# Patient Record
Sex: MALE | Race: WHITE | Employment: OTHER | URBAN - METROPOLITAN AREA
[De-identification: names, ages, dates, MRNs, and addresses within clinical notes are randomized per-mention and may not be internally consistent; named-entity substitution may affect disease eponyms.]

---

## 2020-01-17 ENCOUNTER — APPOINTMENT (OUTPATIENT)
Dept: GENERAL RADIOLOGY | Age: 70
DRG: 378 | End: 2020-01-17
Payer: MEDICARE

## 2020-01-17 ENCOUNTER — APPOINTMENT (OUTPATIENT)
Dept: CT IMAGING | Age: 70
DRG: 378 | End: 2020-01-17
Payer: MEDICARE

## 2020-01-17 ENCOUNTER — HOSPITAL ENCOUNTER (INPATIENT)
Age: 70
LOS: 3 days | Discharge: HOME | DRG: 378 | End: 2020-01-20
Attending: EMERGENCY MEDICINE | Admitting: SPECIALIST
Payer: MEDICARE

## 2020-01-17 PROBLEM — K62.5 BRBPR (BRIGHT RED BLOOD PER RECTUM): Status: ACTIVE | Noted: 2020-01-17

## 2020-01-17 LAB
ALBUMIN SERPL-MCNC: 3.7 GM/DL (ref 3.4–5)
ALP BLD-CCNC: 48 IU/L (ref 40–129)
ALT SERPL-CCNC: 13 U/L (ref 10–40)
ANION GAP SERPL CALCULATED.3IONS-SCNC: 12 MMOL/L (ref 4–16)
AST SERPL-CCNC: 19 IU/L (ref 15–37)
BASOPHILS ABSOLUTE: 0 K/CU MM
BASOPHILS RELATIVE PERCENT: 0.3 % (ref 0–1)
BILIRUB SERPL-MCNC: 0.4 MG/DL (ref 0–1)
BUN BLDV-MCNC: 14 MG/DL (ref 6–23)
CALCIUM SERPL-MCNC: 8.5 MG/DL (ref 8.3–10.6)
CHLORIDE BLD-SCNC: 95 MMOL/L (ref 99–110)
CO2: 23 MMOL/L (ref 21–32)
CREAT SERPL-MCNC: 1 MG/DL (ref 0.9–1.3)
DIFFERENTIAL TYPE: ABNORMAL
EOSINOPHILS ABSOLUTE: 0 K/CU MM
EOSINOPHILS RELATIVE PERCENT: 0.4 % (ref 0–3)
GFR AFRICAN AMERICAN: >60 ML/MIN/1.73M2
GFR NON-AFRICAN AMERICAN: >60 ML/MIN/1.73M2
GLUCOSE BLD-MCNC: 138 MG/DL (ref 70–99)
HCT VFR BLD CALC: 18.2 % (ref 42–52)
HCT VFR BLD CALC: 28 % (ref 42–52)
HEMOGLOBIN: 9.1 GM/DL (ref 13.5–18)
HEMOGLOBIN: ABNORMAL GM/DL (ref 13.5–18)
IMMATURE NEUTROPHIL %: 0.5 % (ref 0–0.43)
INR BLD: 0.92 INDEX
LYMPHOCYTES ABSOLUTE: 1.2 K/CU MM
LYMPHOCYTES RELATIVE PERCENT: 11.6 % (ref 24–44)
MCH RBC QN AUTO: 31.9 PG (ref 27–31)
MCHC RBC AUTO-ENTMCNC: 32.5 % (ref 32–36)
MCV RBC AUTO: 98.2 FL (ref 78–100)
MONOCYTES ABSOLUTE: 1 K/CU MM
MONOCYTES RELATIVE PERCENT: 10.1 % (ref 0–4)
NUCLEATED RBC %: 0 %
PDW BLD-RTO: 12.7 % (ref 11.7–14.9)
PLATELET # BLD: 470 K/CU MM (ref 140–440)
PMV BLD AUTO: 10.1 FL (ref 7.5–11.1)
POTASSIUM SERPL-SCNC: 4.1 MMOL/L (ref 3.5–5.1)
PROTHROMBIN TIME: 11.1 SECONDS (ref 11.7–14.5)
RBC # BLD: 2.85 M/CU MM (ref 4.6–6.2)
SEGMENTED NEUTROPHILS ABSOLUTE COUNT: 7.9 K/CU MM
SEGMENTED NEUTROPHILS RELATIVE PERCENT: 77.1 % (ref 36–66)
SODIUM BLD-SCNC: 130 MMOL/L (ref 135–145)
TOTAL IMMATURE NEUTOROPHIL: 0.05 K/CU MM
TOTAL NUCLEATED RBC: 0 K/CU MM
TOTAL PROTEIN: 5.8 GM/DL (ref 6.4–8.2)
WBC # BLD: 10.3 K/CU MM (ref 4–10.5)

## 2020-01-17 PROCEDURE — 36430 TRANSFUSION BLD/BLD COMPNT: CPT

## 2020-01-17 PROCEDURE — 96374 THER/PROPH/DIAG INJ IV PUSH: CPT

## 2020-01-17 PROCEDURE — 2000000000 HC ICU R&B

## 2020-01-17 PROCEDURE — 6360000002 HC RX W HCPCS: Performed by: PHYSICIAN ASSISTANT

## 2020-01-17 PROCEDURE — 85014 HEMATOCRIT: CPT

## 2020-01-17 PROCEDURE — 71045 X-RAY EXAM CHEST 1 VIEW: CPT

## 2020-01-17 PROCEDURE — 86922 COMPATIBILITY TEST ANTIGLOB: CPT

## 2020-01-17 PROCEDURE — 99285 EMERGENCY DEPT VISIT HI MDM: CPT

## 2020-01-17 PROCEDURE — 85018 HEMOGLOBIN: CPT

## 2020-01-17 PROCEDURE — P9016 RBC LEUKOCYTES REDUCED: HCPCS

## 2020-01-17 PROCEDURE — C9113 INJ PANTOPRAZOLE SODIUM, VIA: HCPCS | Performed by: PHYSICIAN ASSISTANT

## 2020-01-17 PROCEDURE — 94761 N-INVAS EAR/PLS OXIMETRY MLT: CPT

## 2020-01-17 PROCEDURE — 2580000003 HC RX 258: Performed by: PHYSICIAN ASSISTANT

## 2020-01-17 PROCEDURE — 2580000003 HC RX 258: Performed by: NURSE PRACTITIONER

## 2020-01-17 PROCEDURE — 85025 COMPLETE CBC W/AUTO DIFF WBC: CPT

## 2020-01-17 PROCEDURE — 74174 CTA ABD&PLVS W/CONTRAST: CPT

## 2020-01-17 PROCEDURE — 6370000000 HC RX 637 (ALT 250 FOR IP): Performed by: INTERNAL MEDICINE

## 2020-01-17 PROCEDURE — 86850 RBC ANTIBODY SCREEN: CPT

## 2020-01-17 PROCEDURE — 6360000004 HC RX CONTRAST MEDICATION: Performed by: SPECIALIST

## 2020-01-17 PROCEDURE — C9113 INJ PANTOPRAZOLE SODIUM, VIA: HCPCS | Performed by: NURSE PRACTITIONER

## 2020-01-17 PROCEDURE — 85610 PROTHROMBIN TIME: CPT

## 2020-01-17 PROCEDURE — 93005 ELECTROCARDIOGRAM TRACING: CPT | Performed by: PHYSICIAN ASSISTANT

## 2020-01-17 PROCEDURE — 80053 COMPREHEN METABOLIC PANEL: CPT

## 2020-01-17 PROCEDURE — 6360000002 HC RX W HCPCS: Performed by: NURSE PRACTITIONER

## 2020-01-17 PROCEDURE — 36415 COLL VENOUS BLD VENIPUNCTURE: CPT

## 2020-01-17 PROCEDURE — 84520 ASSAY OF UREA NITROGEN: CPT

## 2020-01-17 PROCEDURE — 6370000000 HC RX 637 (ALT 250 FOR IP): Performed by: NURSE PRACTITIONER

## 2020-01-17 PROCEDURE — 86900 BLOOD TYPING SEROLOGIC ABO: CPT

## 2020-01-17 PROCEDURE — 86901 BLOOD TYPING SEROLOGIC RH(D): CPT

## 2020-01-17 RX ORDER — BRIMONIDINE TARTRATE 2 MG/ML
1 SOLUTION/ DROPS OPHTHALMIC EVERY 12 HOURS
Status: DISCONTINUED | OUTPATIENT
Start: 2020-01-17 | End: 2020-01-17

## 2020-01-17 RX ORDER — METOPROLOL TARTRATE 50 MG/1
50 TABLET, FILM COATED ORAL 2 TIMES DAILY
Status: DISCONTINUED | OUTPATIENT
Start: 2020-01-17 | End: 2020-01-20 | Stop reason: HOSPADM

## 2020-01-17 RX ORDER — BRIMONIDINE TARTRATE, TIMOLOL MALEATE 2; 5 MG/ML; MG/ML
1 SOLUTION/ DROPS OPHTHALMIC EVERY 12 HOURS
Status: DISCONTINUED | OUTPATIENT
Start: 2020-01-17 | End: 2020-01-17 | Stop reason: SDUPTHER

## 2020-01-17 RX ORDER — LORAZEPAM 1 MG/1
1 TABLET ORAL
Status: DISCONTINUED | OUTPATIENT
Start: 2020-01-17 | End: 2020-01-20 | Stop reason: HOSPADM

## 2020-01-17 RX ORDER — LATANOPROST 50 UG/ML
1 SOLUTION/ DROPS OPHTHALMIC NIGHTLY
Status: DISCONTINUED | OUTPATIENT
Start: 2020-01-17 | End: 2020-01-17

## 2020-01-17 RX ORDER — LOSARTAN POTASSIUM 25 MG/1
25 TABLET ORAL DAILY
Status: DISCONTINUED | OUTPATIENT
Start: 2020-01-17 | End: 2020-01-20 | Stop reason: HOSPADM

## 2020-01-17 RX ORDER — ALPRAZOLAM 0.25 MG/1
0.25 TABLET ORAL EVERY 6 HOURS PRN
Status: DISCONTINUED | OUTPATIENT
Start: 2020-01-17 | End: 2020-01-20 | Stop reason: HOSPADM

## 2020-01-17 RX ORDER — TRAVOPROST OPHTHALMIC SOLUTION 0.04 MG/ML
1 SOLUTION OPHTHALMIC NIGHTLY
Status: DISCONTINUED | OUTPATIENT
Start: 2020-01-17 | End: 2020-01-20 | Stop reason: HOSPADM

## 2020-01-17 RX ORDER — LORAZEPAM 1 MG/1
3 TABLET ORAL
Status: DISCONTINUED | OUTPATIENT
Start: 2020-01-17 | End: 2020-01-20 | Stop reason: HOSPADM

## 2020-01-17 RX ORDER — TIMOLOL MALEATE 5 MG/ML
1 SOLUTION/ DROPS OPHTHALMIC EVERY 12 HOURS
Status: DISCONTINUED | OUTPATIENT
Start: 2020-01-17 | End: 2020-01-17

## 2020-01-17 RX ORDER — BRIMONIDINE TARTRATE, TIMOLOL MALEATE 2; 5 MG/ML; MG/ML
1 SOLUTION/ DROPS OPHTHALMIC EVERY 12 HOURS
Status: DISCONTINUED | OUTPATIENT
Start: 2020-01-17 | End: 2020-01-20 | Stop reason: HOSPADM

## 2020-01-17 RX ORDER — SODIUM CHLORIDE 0.9 % (FLUSH) 0.9 %
10 SYRINGE (ML) INJECTION EVERY 12 HOURS SCHEDULED
Status: DISCONTINUED | OUTPATIENT
Start: 2020-01-17 | End: 2020-01-20 | Stop reason: HOSPADM

## 2020-01-17 RX ORDER — SODIUM CHLORIDE 0.9 % (FLUSH) 0.9 %
10 SYRINGE (ML) INJECTION EVERY 12 HOURS SCHEDULED
Status: DISCONTINUED | OUTPATIENT
Start: 2020-01-17 | End: 2020-01-17 | Stop reason: SDUPTHER

## 2020-01-17 RX ORDER — FENOFIBRATE 160 MG/1
160 TABLET ORAL DAILY
Status: DISCONTINUED | OUTPATIENT
Start: 2020-01-17 | End: 2020-01-20 | Stop reason: HOSPADM

## 2020-01-17 RX ORDER — LORAZEPAM 2 MG/ML
3 INJECTION INTRAMUSCULAR
Status: DISCONTINUED | OUTPATIENT
Start: 2020-01-17 | End: 2020-01-20 | Stop reason: HOSPADM

## 2020-01-17 RX ORDER — ACETAMINOPHEN 325 MG/1
650 TABLET ORAL EVERY 4 HOURS PRN
Status: DISCONTINUED | OUTPATIENT
Start: 2020-01-17 | End: 2020-01-20 | Stop reason: HOSPADM

## 2020-01-17 RX ORDER — BRIMONIDINE TARTRATE, TIMOLOL MALEATE 2; 5 MG/ML; MG/ML
1 SOLUTION/ DROPS OPHTHALMIC EVERY 12 HOURS
COMMUNITY

## 2020-01-17 RX ORDER — ONDANSETRON 2 MG/ML
4 INJECTION INTRAMUSCULAR; INTRAVENOUS EVERY 6 HOURS PRN
Status: DISCONTINUED | OUTPATIENT
Start: 2020-01-17 | End: 2020-01-20 | Stop reason: HOSPADM

## 2020-01-17 RX ORDER — 0.9 % SODIUM CHLORIDE 0.9 %
20 INTRAVENOUS SOLUTION INTRAVENOUS ONCE
Status: DISCONTINUED | OUTPATIENT
Start: 2020-01-17 | End: 2020-01-20 | Stop reason: HOSPADM

## 2020-01-17 RX ORDER — 0.9 % SODIUM CHLORIDE 0.9 %
250 INTRAVENOUS SOLUTION INTRAVENOUS ONCE
Status: DISCONTINUED | OUTPATIENT
Start: 2020-01-17 | End: 2020-01-20 | Stop reason: HOSPADM

## 2020-01-17 RX ORDER — NIFEDIPINE 30 MG/1
60 TABLET, EXTENDED RELEASE ORAL DAILY
Status: DISCONTINUED | OUTPATIENT
Start: 2020-01-17 | End: 2020-01-20 | Stop reason: HOSPADM

## 2020-01-17 RX ORDER — SODIUM CHLORIDE 0.9 % (FLUSH) 0.9 %
10 SYRINGE (ML) INJECTION PRN
Status: DISCONTINUED | OUTPATIENT
Start: 2020-01-17 | End: 2020-01-20 | Stop reason: HOSPADM

## 2020-01-17 RX ORDER — PANTOPRAZOLE SODIUM 40 MG/10ML
40 INJECTION, POWDER, LYOPHILIZED, FOR SOLUTION INTRAVENOUS ONCE
Status: COMPLETED | OUTPATIENT
Start: 2020-01-17 | End: 2020-01-17

## 2020-01-17 RX ORDER — FOLIC ACID 1 MG/1
1 TABLET ORAL DAILY
Status: DISCONTINUED | OUTPATIENT
Start: 2020-01-17 | End: 2020-01-20 | Stop reason: HOSPADM

## 2020-01-17 RX ORDER — BRINZOLAMIDE 10 MG/ML
1 SUSPENSION/ DROPS OPHTHALMIC 3 TIMES DAILY
Status: DISCONTINUED | OUTPATIENT
Start: 2020-01-17 | End: 2020-01-20 | Stop reason: HOSPADM

## 2020-01-17 RX ORDER — PANTOPRAZOLE SODIUM 40 MG/10ML
40 INJECTION, POWDER, LYOPHILIZED, FOR SOLUTION INTRAVENOUS 2 TIMES DAILY
Status: DISCONTINUED | OUTPATIENT
Start: 2020-01-17 | End: 2020-01-19

## 2020-01-17 RX ORDER — SODIUM CHLORIDE 9 MG/ML
INJECTION, SOLUTION INTRAVENOUS CONTINUOUS
Status: DISPENSED | OUTPATIENT
Start: 2020-01-17 | End: 2020-01-18

## 2020-01-17 RX ORDER — SODIUM CHLORIDE 0.9 % (FLUSH) 0.9 %
10 SYRINGE (ML) INJECTION PRN
Status: DISCONTINUED | OUTPATIENT
Start: 2020-01-17 | End: 2020-01-17 | Stop reason: SDUPTHER

## 2020-01-17 RX ORDER — LORAZEPAM 2 MG/ML
2 INJECTION INTRAMUSCULAR
Status: DISCONTINUED | OUTPATIENT
Start: 2020-01-17 | End: 2020-01-20 | Stop reason: HOSPADM

## 2020-01-17 RX ORDER — LORAZEPAM 2 MG/ML
4 INJECTION INTRAMUSCULAR
Status: DISCONTINUED | OUTPATIENT
Start: 2020-01-17 | End: 2020-01-20 | Stop reason: HOSPADM

## 2020-01-17 RX ORDER — THIAMINE MONONITRATE (VIT B1) 100 MG
100 TABLET ORAL DAILY
Status: DISCONTINUED | OUTPATIENT
Start: 2020-01-17 | End: 2020-01-20 | Stop reason: HOSPADM

## 2020-01-17 RX ORDER — DORZOLAMIDE HCL 20 MG/ML
1 SOLUTION/ DROPS OPHTHALMIC 3 TIMES DAILY
Status: DISCONTINUED | OUTPATIENT
Start: 2020-01-17 | End: 2020-01-17

## 2020-01-17 RX ORDER — LORAZEPAM 2 MG/ML
1 INJECTION INTRAMUSCULAR
Status: DISCONTINUED | OUTPATIENT
Start: 2020-01-17 | End: 2020-01-20 | Stop reason: HOSPADM

## 2020-01-17 RX ORDER — LORAZEPAM 1 MG/1
2 TABLET ORAL
Status: DISCONTINUED | OUTPATIENT
Start: 2020-01-17 | End: 2020-01-20 | Stop reason: HOSPADM

## 2020-01-17 RX ORDER — LORAZEPAM 1 MG/1
4 TABLET ORAL
Status: DISCONTINUED | OUTPATIENT
Start: 2020-01-17 | End: 2020-01-20 | Stop reason: HOSPADM

## 2020-01-17 RX ORDER — 0.9 % SODIUM CHLORIDE 0.9 %
1000 INTRAVENOUS SOLUTION INTRAVENOUS ONCE
Status: COMPLETED | OUTPATIENT
Start: 2020-01-17 | End: 2020-01-17

## 2020-01-17 RX ORDER — NICOTINE 21 MG/24HR
1 PATCH, TRANSDERMAL 24 HOURS TRANSDERMAL DAILY
Status: DISCONTINUED | OUTPATIENT
Start: 2020-01-17 | End: 2020-01-20 | Stop reason: HOSPADM

## 2020-01-17 RX ORDER — ATORVASTATIN CALCIUM 40 MG/1
40 TABLET, FILM COATED ORAL DAILY
Status: DISCONTINUED | OUTPATIENT
Start: 2020-01-17 | End: 2020-01-20 | Stop reason: HOSPADM

## 2020-01-17 RX ADMIN — FENOFIBRATE 160 MG: 160 TABLET ORAL at 16:11

## 2020-01-17 RX ADMIN — FOLIC ACID 1 MG: 1 TABLET ORAL at 16:28

## 2020-01-17 RX ADMIN — SODIUM CHLORIDE: 9 INJECTION, SOLUTION INTRAVENOUS at 16:12

## 2020-01-17 RX ADMIN — SODIUM CHLORIDE 1000 ML: 9 INJECTION, SOLUTION INTRAVENOUS at 11:24

## 2020-01-17 RX ADMIN — BRINZOLAMIDE 1 DROP: 10 SUSPENSION/ DROPS OPHTHALMIC at 17:51

## 2020-01-17 RX ADMIN — NIFEDIPINE 60 MG: 30 TABLET, FILM COATED, EXTENDED RELEASE ORAL at 16:09

## 2020-01-17 RX ADMIN — Medication 100 MG: at 16:28

## 2020-01-17 RX ADMIN — LOSARTAN POTASSIUM 25 MG: 25 TABLET, FILM COATED ORAL at 16:10

## 2020-01-17 RX ADMIN — BRIMONIDINE TARTRATE, TIMOLOL MALEATE 1 DROP: 2; 5 SOLUTION/ DROPS OPHTHALMIC at 17:10

## 2020-01-17 RX ADMIN — PANTOPRAZOLE SODIUM 40 MG: 40 INJECTION, POWDER, FOR SOLUTION INTRAVENOUS at 11:24

## 2020-01-17 RX ADMIN — IOPAMIDOL 90 ML: 755 INJECTION, SOLUTION INTRAVENOUS at 22:58

## 2020-01-17 RX ADMIN — METOPROLOL TARTRATE 50 MG: 50 TABLET, FILM COATED ORAL at 16:10

## 2020-01-17 RX ADMIN — PANTOPRAZOLE SODIUM 40 MG: 40 INJECTION, POWDER, FOR SOLUTION INTRAVENOUS at 23:21

## 2020-01-17 RX ADMIN — TRAVOPROST OPHTHALMIC SOLUTION 1 DROP: 0.04 SOLUTION OPHTHALMIC at 23:51

## 2020-01-17 ASSESSMENT — PAIN SCALES - GENERAL: PAINLEVEL_OUTOF10: 0

## 2020-01-17 NOTE — PROGRESS NOTES
Admission skin assessment completed by this rn and na, [place]. No open areas/redness noted. Scaley skin noted on bilateral heels.

## 2020-01-17 NOTE — ED PROVIDER NOTES
I independently examined and evaluated Laci Botello. In brief, patient presenting with rectal bleeding both bright red and now darker, slightly lightheaded, no chest pain or shortness of breath. Focused exam revealed resting comfortably, respirations nonlabored. ED course: Patient is slightly tachycardic otherwise vital signs are not unstable work-up initiated patient has an acute anemia drop from 11-1/2-9.1 BUN and creatinine not elevated, do not need to transfuse right now but do need to bring into the hospital, hospitalist notified    All diagnostic, treatment, and disposition decisions were made by myself in conjunction with the advanced practice provider. For all further details of the patient's emergency department visit, please see the advanced practice provider's documentation. Comment: Please note this report has been produced using speech recognition software and may contain errors related to that system including errors in grammar, punctuation, and spelling, as well as words and phrases that may be inappropriate. If there are any questions or concerns please feel free to contact the dictating provider for clarification.         Sisi Quinones MD  01/17/20 [id number]

## 2020-01-17 NOTE — PROGRESS NOTES
Historical Provider, MD   NIFEdipine (PROCARDIA XL) 60 MG extended release tablet Take 60 mg by mouth daily   Yes Historical Provider, MD   nicotine (NICODERM CQ) 14 MG/24HR Place 1 patch onto the skin daily 9/14/16   Ayden Castanon MD       Medications added or changed (ex.  new medication, dosage change, interval change, formulation change):  Combigan new medication  Spiriva new medication  Nutritional supplements new medication  Travatan clarified as 1 drop in left eye nightly from both eye nightly per patient  Azopt clarified as 1 drop into left eye tid from both eyes tid per patient    Medications removed from list (include reason, ex. noncompliance, medication cost, therapy complete etc.):   Protonix no longer taking  Timolol no longer using    Medications requiring reconciliation with provider:   Brilinta not ordered and patient takes  Nicotine patch not ordered and patient requested while in hospital  Aspirin not ordered and patient takes  Lomotil not ordered and patient takes  Timolol ordered and patient no longer uses  Combigan new medication not ordered  Spiriva new medication not ordered  Travatan not ordered and patient uses  Nutritional supplements new medication not ordered    Comments:  Reviewed and updated med list per patient and verified with claims  Patient states due to bleeding issues he has not taken brilinta or aspirin in about 2 days  Patient states he is not using nicotine patches but he still smokes and would like one while he is in the hospital    To my knowledge the above medication history is accurate as of 1/17/2020 12:48 PM.   Daryle Dura, CPhT   1/17/2020 12:48 PM

## 2020-01-17 NOTE — ED NOTES
Per daughter, pt drinks 4-5 glasses of whiskey per day and smokes 2 packs of cigarettes per day       Korey Gr RN  01/17/20 1918

## 2020-01-17 NOTE — H&P
History and Physical      Name:  Saul Gallagher /Age/Sex:   (68 y.o. male)   MRN & CSN:  [id number] & [id number] Admission Date/Time: 2020 10:03 AM   Location:  ED16/ED-16 PCP: Riya Castro MD       Discussed patient with Dr. Tabitha Prather and Plan:     Saul Gallagher is a 68 y.o.  male  who presents with BRBPR    - BRBPR  2 days  Hgb 9.1  Consult to GI  Type and screen and transfuse for Hgb < 7.0  H & H Q 6 hrs x 4   Hold ASA and Brilinta (PTCA )  Protonix IV BID  Clear liquid diet & IVF      Chronic  - HTN- Stable; cont BB, nifedipine, cozaar with parameters  - HLD- Statin  - Anxiety- xanax  - Glaucoma- continue home regimen  - COPD- Not O2 dependent; not in exacerbation; continue home meds    Discussed patient with ER physician    Diet Clear liquid   DVT Prophylaxis [] Lovenox, []  Heparin, [x] SCDs, [] Ambulation   GI Prophylaxis [x] PPI,  [] H2 Blocker,  [] Carafate,  [] Diet/Tube Feeds   Code Status Full    Disposition Patient requires continued admission due to BRBPR   MDM [] Low, [x] Moderate,[]  High  Patient's risk as above due to      [] One or more chronic illnesses with exacerbation progression      [x] Two or more stable chronic illnesses      [x] Undiagnosed new problem with uncertain prognosis      [] Elective major surgery      []Prescription drug management       History of Present Illness:     Chief Complaint: Bright red blood per rectum    Saul Gallagher is a 68 y.o.  male  who presents with the above complaints, onset yesterday. Reports multiple episodes of BRBPR yesterday, continuing into today. Denies hematochezia. Reports 2 prior c-scopes with polyp removal, last . Denies hx of ulcer. Reports mild SOB over baseline (COPD hx). Reports PTCA done in 2016- no stents since. Takes brilinta and asa, which he stopped yesterday. Denies pain, including chest pain. Has PMH of HTN,HLD, glaucoma.       Ten point ROS reviewed negative, unless as noted above    Objective:     No intake or output data in the 24 hours ending 20 1231     Vitals:   Vitals:    20 1213   BP: 147/77   Pulse: 89   Resp: 18   Temp: 97.9   SpO2: 98%       Physical Exam:     GEN Awake male, sitting upright in bed in no apparent distress. Appears given age. EYES Pupils are equally round. No scleral erythema, discharge, or conjunctivitis. HENT Mucous membranes are moist. Oral pharynx without exudates, no evidence of thrush. NECK Supple, no apparent thyromegaly or masses. RESP Clear to auscultation, no wheezes, rales or rhonchi. Symmetric chest movement while on room air. CARDIO/VASC S1/S2 auscultated. Regular rate without appreciable murmurs, rubs, or gallops. No JVD or carotid bruits. Peripheral pulses equal bilaterally and palpable. No peripheral edema. GI Abdomen is soft without significant tenderness, masses, or guarding. Bowel sounds are normoactive. Rectal exam deferred.  No costovertebral angle tenderness. Giron catheter is not present. HEME/LYMPH No palpable cervical lymphadenopathy and no hepatosplenomegaly. No petechiae or ecchymoses. MSK No gross joint deformities. Strength equal in BLE and BUE  SKIN Normal coloration, warm, dry. NEURO Cranial nerves appear grossly intact, normal speech, no lateralizing weakness. PSYCH Awake, alert, oriented x 4. Affect appropriate. Past Medical History:        Past Medical History:   Diagnosis Date    Anxiety     Cancer [facility])     prostate    COPD (chronic obstructive pulmonary disease) ([facility] [address]     Glaucoma     Hyperlipidemia     Hypertension        PSHX:  has a past surgical history that includes Colonoscopy; Tonsillectomy; and Prostate surgery ().     Allergies: No Known Allergies    FAM HX: Both parents ; no known family history    Soc HX:   Social History     Socioeconomic History    Marital status:      Spouse name: None    Number of children: None    Years of education: None    Highest

## 2020-01-17 NOTE — ED PROVIDER NOTES
TABS tablet Take 1 tablet by mouth 2 times daily 60 tablet 1    nicotine (NICODERM CQ) 14 MG/24HR Place 1 patch onto the skin daily 30 patch 1    pantoprazole (PROTONIX) 40 MG tablet Take 1 tablet by mouth every morning (before breakfast) 30 tablet 1    aspirin EC 81 MG EC tablet Take 1 tablet by mouth daily 30 tablet 1    travoprost, benzalkonium, (TRAVATAN) 0.004 % ophthalmic solution Place 1 drop into both eyes nightly      brinzolamide (AZOPT) 1 % ophthalmic suspension Place 1 drop into both eyes 3 times daily      timolol (BETIMOL) 0.25 % ophthalmic solution Place 1-2 drops into both eyes every morning      ALPRAZolam (XANAX) 0.25 MG tablet Take 0.25 mg by mouth every 6 hours as needed for Sleep or Anxiety      atorvastatin (LIPITOR) 40 MG tablet Take 40 mg by mouth daily      fenofibrate (TRICOR) 145 MG tablet Take 145 mg by mouth daily      diphenoxylate-atropine (LOMOTIL) 2.5-0.025 MG per tablet Take 2 tablets by mouth daily as needed for Diarrhea      losartan (COZAAR) 25 MG tablet Take 25 mg by mouth daily      NIFEdipine (PROCARDIA XL) 60 MG extended release tablet Take 60 mg by mouth daily         ALLERGIES    No Known Allergies    SOCIAL AND FAMILY HISTORY    Social History     Socioeconomic History    Marital status:      Spouse name: None    Number of children: None    Years of education: None    Highest education level: None   Occupational History    None   Social Needs    Financial resource strain: None    Food insecurity:     Worry: None     Inability: None    Transportation needs:     Medical: None     Non-medical: None   Tobacco Use    Smoking status: Current Every Day Smoker     Packs/day: 1.50     Types: Cigarettes    Smokeless tobacco: Never Used   Substance and Sexual Activity    Alcohol use:  Yes     Alcohol/week: 4.0 - 5.0 standard drinks     Types: 4 - 5 Glasses of wine per week     Comment: mixed drinks per day, whiskey    Drug use: Never    Sexual activity: None   Lifestyle    Physical activity:     Days per week: None     Minutes per session: None    Stress: None   Relationships    Social connections:     Talks on phone: None     Gets together: None     Attends Oriental orthodox service: None     Active member of club or organization: None     Attends meetings of clubs or organizations: None     Relationship status: None    Intimate partner violence:     Fear of current or ex partner: None     Emotionally abused: None     Physically abused: None     Forced sexual activity: None   Other Topics Concern    None   Social History Narrative    None     History reviewed. No pertinent family history. PHYSICAL EXAM    VITAL SIGNS: /61   Pulse 89   Temp 97.9 °F (36.6 °C)   Resp 18   Ht 5' 8\" (1.727 m)   Wt 155 lb (70.3 kg)   SpO2 98%   BMI 23.57 kg/m²   Constitutional:  Well developed, well nourished. No distress  Eyes:  Sclera nonicteric, conjunctiva moist  HENT:  Atraumatic. PERRL. EOMI. Moist mucus membranes. Neck/Lymphatics: supple, no JVD, no swollen nodes  Respiratory:  No retractions, no accessory muscle use, normal breath sounds   Cardiovascular:   normal rate, normal rhythm, no murmurs    GI:     No gross discoloration. Bowel sounds present, no audible bruits. Soft,  no distention, no guarding, no rigidity,   no abdominal tenderness, no rebound tenderness, no palpable pulsatile masses    Back:   No CVA tenderness to percussion.   Musculoskeletal:  No edema, no deformity  Vascular: DP pulses 2+ equal bilaterally  Integument: No rash, dry skin  Neurologic:  Alert & oriented, normal speech  Psychiatric: Cooperative, pleasant affect       LABS:  Results for orders placed or performed during the hospital encounter of 01/17/20   CBC auto diff   Result Value Ref Range    WBC 10.3 4.0 - 10.5 K/CU MM    RBC 2.85 (L) 4.6 - 6.2 M/CU MM    Hemoglobin 9.1 (L) 13.5 - 18.0 GM/DL    Hematocrit 28.0 (L) 42 - 52 %    MCV 98.2 78 - 100 FL    MCH 31.9 (H) 27 - 31 PG    MCHC 32.5 32.0 - 36.0 %    RDW 12.7 11.7 - 14.9 %    Platelets 930 (H) 007 - 440 K/CU MM    MPV 10.1 7.5 - 11.1 FL    Differential Type AUTOMATED DIFFERENTIAL     Segs Relative 77.1 (H) 36 - 66 %    Lymphocytes % 11.6 (L) 24 - 44 %    Monocytes % 10.1 (H) 0 - 4 %    Eosinophils % 0.4 0 - 3 %    Basophils % 0.3 0 - 1 %    Segs Absolute 7.9 K/CU MM    Lymphocytes Absolute 1.2 K/CU MM    Monocytes Absolute 1.0 K/CU MM    Eosinophils Absolute 0.0 K/CU MM    Basophils Absolute 0.0 K/CU MM    Nucleated RBC % 0.0 %    Total Nucleated RBC 0.0 K/CU MM    Total Immature Neutrophil 0.05 K/CU MM    Immature Neutrophil % 0.5 (H) 0 - 0.43 %   CMP   Result Value Ref Range    Sodium 130 (L) 135 - 145 MMOL/L    Potassium 4.1 3.5 - 5.1 MMOL/L    Chloride 95 (L) 99 - 110 mMol/L    CO2 23 21 - 32 MMOL/L    BUN 14 6 - 23 MG/DL    CREATININE 1.0 0.9 - 1.3 MG/DL    Glucose 138 (H) 70 - 99 MG/DL    Calcium 8.5 8.3 - 10.6 MG/DL    Alb 3.7 3.4 - 5.0 GM/DL    Total Protein 5.8 (L) 6.4 - 8.2 GM/DL    Total Bilirubin 0.4 0.0 - 1.0 MG/DL    ALT 13 10 - 40 U/L    AST 19 15 - 37 IU/L    Alkaline Phosphatase 48 40 - 129 IU/L    GFR Non-African American >60 >60 mL/min/1.73m2    GFR African American >60 >60 mL/min/1.73m2    Anion Gap 12 4 - 16   PT - INR   Result Value Ref Range    Protime 11.1 (L) 11.7 - 14.5 SECONDS    INR 0.92 INDEX   EKG 12 Lead   Result Value Ref Range    Ventricular Rate 95 BPM    Atrial Rate 95 BPM    P-R Interval 172 ms    QRS Duration 96 ms    Q-T Interval 352 ms    QTc Calculation (Bazett) 442 ms    P Axis 75 degrees    R Axis 38 degrees    T Axis 82 degrees    Diagnosis       Normal sinus rhythm  ST elevation, consider early repolarization, pericarditis, or injury  Abnormal ECG  When compared with ECG of 12-SEP-2016 15:57,  Incomplete right bundle branch block is no longer present             RADIOLOGY/PROCEDURES    XR CHEST PORTABLE   Final Result   Clear lungs. Severe emphysematous changes.   No significant

## 2020-01-18 ENCOUNTER — ANESTHESIA (OUTPATIENT)
Dept: ENDOSCOPY | Age: 70
DRG: 378 | End: 2020-01-18
Payer: MEDICARE

## 2020-01-18 ENCOUNTER — ANESTHESIA EVENT (OUTPATIENT)
Dept: ENDOSCOPY | Age: 70
DRG: 378 | End: 2020-01-18
Payer: MEDICARE

## 2020-01-18 VITALS — DIASTOLIC BLOOD PRESSURE: 56 MMHG | OXYGEN SATURATION: 100 % | SYSTOLIC BLOOD PRESSURE: 125 MMHG

## 2020-01-18 LAB
ANION GAP SERPL CALCULATED.3IONS-SCNC: 7 MMOL/L (ref 4–16)
BUN BLDV-MCNC: 12 MG/DL (ref 6–23)
BUN BLDV-MCNC: 9 MG/DL (ref 6–23)
CALCIUM SERPL-MCNC: 7.9 MG/DL (ref 8.3–10.6)
CHLORIDE BLD-SCNC: 102 MMOL/L (ref 99–110)
CO2: 24 MMOL/L (ref 21–32)
CREAT SERPL-MCNC: 0.7 MG/DL (ref 0.9–1.3)
GFR AFRICAN AMERICAN: >60 ML/MIN/1.73M2
GFR NON-AFRICAN AMERICAN: >60 ML/MIN/1.73M2
GLUCOSE BLD-MCNC: 96 MG/DL (ref 70–99)
HCT VFR BLD CALC: 21.7 % (ref 42–52)
HCT VFR BLD CALC: 23.6 % (ref 42–52)
HCT VFR BLD CALC: 24.3 % (ref 42–52)
HCT VFR BLD CALC: 25.3 % (ref 42–52)
HCT VFR BLD CALC: 26.6 % (ref 42–52)
HCT VFR BLD CALC: 27.3 % (ref 42–52)
HCT VFR BLD CALC: 30.1 % (ref 42–52)
HEMOGLOBIN: 7.1 GM/DL (ref 13.5–18)
HEMOGLOBIN: 7.9 GM/DL (ref 13.5–18)
HEMOGLOBIN: 8 GM/DL (ref 13.5–18)
HEMOGLOBIN: 8.2 GM/DL (ref 13.5–18)
HEMOGLOBIN: 8.5 GM/DL (ref 13.5–18)
HEMOGLOBIN: 8.8 GM/DL (ref 13.5–18)
HEMOGLOBIN: 9.7 GM/DL (ref 13.5–18)
MAGNESIUM: 1.6 MG/DL (ref 1.8–2.4)
POTASSIUM SERPL-SCNC: 4.3 MMOL/L (ref 3.5–5.1)
SODIUM BLD-SCNC: 133 MMOL/L (ref 135–145)
TSH HIGH SENSITIVITY: 3.58 UIU/ML (ref 0.27–4.2)

## 2020-01-18 PROCEDURE — 2709999900 HC NON-CHARGEABLE SUPPLY: Performed by: SPECIALIST

## 2020-01-18 PROCEDURE — 2000000000 HC ICU R&B

## 2020-01-18 PROCEDURE — 0DBK8ZZ EXCISION OF ASCENDING COLON, VIA NATURAL OR ARTIFICIAL OPENING ENDOSCOPIC: ICD-10-PCS | Performed by: SPECIALIST

## 2020-01-18 PROCEDURE — 6370000000 HC RX 637 (ALT 250 FOR IP): Performed by: SPECIALIST

## 2020-01-18 PROCEDURE — 88305 TISSUE EXAM BY PATHOLOGIST: CPT

## 2020-01-18 PROCEDURE — 3700000000 HC ANESTHESIA ATTENDED CARE: Performed by: SPECIALIST

## 2020-01-18 PROCEDURE — C9113 INJ PANTOPRAZOLE SODIUM, VIA: HCPCS | Performed by: SPECIALIST

## 2020-01-18 PROCEDURE — 2580000003 HC RX 258: Performed by: SPECIALIST

## 2020-01-18 PROCEDURE — 3609009900 HC COLONOSCOPY W/CONTROL BLEEDING ANY METHOD: Performed by: SPECIALIST

## 2020-01-18 PROCEDURE — 6360000002 HC RX W HCPCS: Performed by: NURSE ANESTHETIST, CERTIFIED REGISTERED

## 2020-01-18 PROCEDURE — 6360000002 HC RX W HCPCS: Performed by: NURSE PRACTITIONER

## 2020-01-18 PROCEDURE — 2580000003 HC RX 258: Performed by: INTERNAL MEDICINE

## 2020-01-18 PROCEDURE — 2500000003 HC RX 250 WO HCPCS: Performed by: NURSE ANESTHETIST, CERTIFIED REGISTERED

## 2020-01-18 PROCEDURE — 6360000002 HC RX W HCPCS: Performed by: INTERNAL MEDICINE

## 2020-01-18 PROCEDURE — 2580000003 HC RX 258: Performed by: NURSE PRACTITIONER

## 2020-01-18 PROCEDURE — 84443 ASSAY THYROID STIM HORMONE: CPT

## 2020-01-18 PROCEDURE — 93010 ELECTROCARDIOGRAM REPORT: CPT | Performed by: INTERNAL MEDICINE

## 2020-01-18 PROCEDURE — 85014 HEMATOCRIT: CPT

## 2020-01-18 PROCEDURE — 6360000002 HC RX W HCPCS: Performed by: SURGERY

## 2020-01-18 PROCEDURE — C9113 INJ PANTOPRAZOLE SODIUM, VIA: HCPCS | Performed by: NURSE PRACTITIONER

## 2020-01-18 PROCEDURE — 6370000000 HC RX 637 (ALT 250 FOR IP): Performed by: INTERNAL MEDICINE

## 2020-01-18 PROCEDURE — 85025 COMPLETE CBC W/AUTO DIFF WBC: CPT

## 2020-01-18 PROCEDURE — 85018 HEMOGLOBIN: CPT

## 2020-01-18 PROCEDURE — 2720000010 HC SURG SUPPLY STERILE: Performed by: SPECIALIST

## 2020-01-18 PROCEDURE — 83735 ASSAY OF MAGNESIUM: CPT

## 2020-01-18 PROCEDURE — 3700000001 HC ADD 15 MINUTES (ANESTHESIA): Performed by: SPECIALIST

## 2020-01-18 PROCEDURE — 2580000003 HC RX 258: Performed by: SURGERY

## 2020-01-18 PROCEDURE — 2580000003 HC RX 258: Performed by: NURSE ANESTHETIST, CERTIFIED REGISTERED

## 2020-01-18 PROCEDURE — 80048 BASIC METABOLIC PNL TOTAL CA: CPT

## 2020-01-18 PROCEDURE — 6360000002 HC RX W HCPCS: Performed by: SPECIALIST

## 2020-01-18 RX ORDER — SODIUM CHLORIDE, SODIUM LACTATE, POTASSIUM CHLORIDE, CALCIUM CHLORIDE 600; 310; 30; 20 MG/100ML; MG/100ML; MG/100ML; MG/100ML
INJECTION, SOLUTION INTRAVENOUS CONTINUOUS PRN
Status: DISCONTINUED | OUTPATIENT
Start: 2020-01-18 | End: 2020-01-18 | Stop reason: SDUPTHER

## 2020-01-18 RX ORDER — MAGNESIUM SULFATE IN WATER 40 MG/ML
2 INJECTION, SOLUTION INTRAVENOUS ONCE
Status: COMPLETED | OUTPATIENT
Start: 2020-01-18 | End: 2020-01-18

## 2020-01-18 RX ORDER — PROPOFOL 10 MG/ML
INJECTION, EMULSION INTRAVENOUS PRN
Status: DISCONTINUED | OUTPATIENT
Start: 2020-01-18 | End: 2020-01-18 | Stop reason: SDUPTHER

## 2020-01-18 RX ORDER — LIDOCAINE HYDROCHLORIDE 20 MG/ML
INJECTION, SOLUTION EPIDURAL; INFILTRATION; INTRACAUDAL; PERINEURAL PRN
Status: DISCONTINUED | OUTPATIENT
Start: 2020-01-18 | End: 2020-01-18 | Stop reason: SDUPTHER

## 2020-01-18 RX ORDER — FENTANYL CITRATE 50 UG/ML
INJECTION, SOLUTION INTRAMUSCULAR; INTRAVENOUS PRN
Status: DISCONTINUED | OUTPATIENT
Start: 2020-01-18 | End: 2020-01-18 | Stop reason: SDUPTHER

## 2020-01-18 RX ADMIN — ALPRAZOLAM 0.25 MG: 0.25 TABLET ORAL at 21:06

## 2020-01-18 RX ADMIN — PROPOFOL 30 MG: 10 INJECTION, EMULSION INTRAVENOUS at 12:47

## 2020-01-18 RX ADMIN — SODIUM CHLORIDE, PRESERVATIVE FREE 10 ML: 5 INJECTION INTRAVENOUS at 08:28

## 2020-01-18 RX ADMIN — FOLIC ACID 1 MG: 1 TABLET ORAL at 13:41

## 2020-01-18 RX ADMIN — CALCIUM GLUCONATE 2 G: 98 INJECTION, SOLUTION INTRAVENOUS at 11:55

## 2020-01-18 RX ADMIN — PROPOFOL 30 MG: 10 INJECTION, EMULSION INTRAVENOUS at 12:21

## 2020-01-18 RX ADMIN — GLUCAGON HYDROCHLORIDE 0.5 MG: KIT at 12:49

## 2020-01-18 RX ADMIN — BRINZOLAMIDE 1 DROP: 10 SUSPENSION/ DROPS OPHTHALMIC at 07:57

## 2020-01-18 RX ADMIN — PROPOFOL 50 MG: 10 INJECTION, EMULSION INTRAVENOUS at 12:15

## 2020-01-18 RX ADMIN — PROPOFOL 30 MG: 10 INJECTION, EMULSION INTRAVENOUS at 12:54

## 2020-01-18 RX ADMIN — ATORVASTATIN CALCIUM 40 MG: 40 TABLET, FILM COATED ORAL at 13:41

## 2020-01-18 RX ADMIN — PROPOFOL 30 MG: 10 INJECTION, EMULSION INTRAVENOUS at 12:28

## 2020-01-18 RX ADMIN — PROPOFOL 50 MG: 10 INJECTION, EMULSION INTRAVENOUS at 12:13

## 2020-01-18 RX ADMIN — SODIUM CHLORIDE, PRESERVATIVE FREE 10 ML: 5 INJECTION INTRAVENOUS at 20:58

## 2020-01-18 RX ADMIN — BRIMONIDINE TARTRATE, TIMOLOL MALEATE 1 DROP: 2; 5 SOLUTION/ DROPS OPHTHALMIC at 20:57

## 2020-01-18 RX ADMIN — BRINZOLAMIDE 1 DROP: 10 SUSPENSION/ DROPS OPHTHALMIC at 13:56

## 2020-01-18 RX ADMIN — LIDOCAINE HYDROCHLORIDE 100 MG: 20 INJECTION, SOLUTION EPIDURAL; INFILTRATION; INTRACAUDAL; PERINEURAL at 12:07

## 2020-01-18 RX ADMIN — MAGNESIUM SULFATE 2 G: 2 INJECTION INTRAVENOUS at 11:39

## 2020-01-18 RX ADMIN — PANTOPRAZOLE SODIUM 40 MG: 40 INJECTION, POWDER, FOR SOLUTION INTRAVENOUS at 09:15

## 2020-01-18 RX ADMIN — Medication 100 MG: at 13:40

## 2020-01-18 RX ADMIN — PROPOFOL 20 MG: 10 INJECTION, EMULSION INTRAVENOUS at 12:26

## 2020-01-18 RX ADMIN — PROPOFOL 50 MG: 10 INJECTION, EMULSION INTRAVENOUS at 12:10

## 2020-01-18 RX ADMIN — FENOFIBRATE 160 MG: 160 TABLET ORAL at 13:41

## 2020-01-18 RX ADMIN — PROPOFOL 30 MG: 10 INJECTION, EMULSION INTRAVENOUS at 12:18

## 2020-01-18 RX ADMIN — SODIUM CHLORIDE: 9 INJECTION, SOLUTION INTRAVENOUS at 01:41

## 2020-01-18 RX ADMIN — SODIUM CHLORIDE, POTASSIUM CHLORIDE, SODIUM LACTATE AND CALCIUM CHLORIDE: 600; 310; 30; 20 INJECTION, SOLUTION INTRAVENOUS at 12:00

## 2020-01-18 RX ADMIN — PROPOFOL 30 MG: 10 INJECTION, EMULSION INTRAVENOUS at 12:51

## 2020-01-18 RX ADMIN — TRAVOPROST OPHTHALMIC SOLUTION 1 DROP: 0.04 SOLUTION OPHTHALMIC at 20:56

## 2020-01-18 RX ADMIN — BRIMONIDINE TARTRATE, TIMOLOL MALEATE 1 DROP: 2; 5 SOLUTION/ DROPS OPHTHALMIC at 08:26

## 2020-01-18 RX ADMIN — FENTANYL CITRATE 25 MCG: 50 INJECTION INTRAMUSCULAR; INTRAVENOUS at 12:38

## 2020-01-18 RX ADMIN — METOPROLOL TARTRATE 50 MG: 50 TABLET, FILM COATED ORAL at 20:55

## 2020-01-18 RX ADMIN — PROPOFOL 30 MG: 10 INJECTION, EMULSION INTRAVENOUS at 12:35

## 2020-01-18 RX ADMIN — GLUCAGON HYDROCHLORIDE 0.5 MG: KIT at 12:28

## 2020-01-18 RX ADMIN — GLUCAGON HYDROCHLORIDE 0.5 MG: KIT at 12:41

## 2020-01-18 RX ADMIN — PROPOFOL 30 MG: 10 INJECTION, EMULSION INTRAVENOUS at 12:57

## 2020-01-18 RX ADMIN — PANTOPRAZOLE SODIUM 40 MG: 40 INJECTION, POWDER, FOR SOLUTION INTRAVENOUS at 20:55

## 2020-01-18 RX ADMIN — BRINZOLAMIDE 1 DROP: 10 SUSPENSION/ DROPS OPHTHALMIC at 20:56

## 2020-01-18 RX ADMIN — LOSARTAN POTASSIUM 25 MG: 25 TABLET, FILM COATED ORAL at 13:41

## 2020-01-18 RX ADMIN — NIFEDIPINE 60 MG: 30 TABLET, FILM COATED, EXTENDED RELEASE ORAL at 13:41

## 2020-01-18 RX ADMIN — PROPOFOL 30 MG: 10 INJECTION, EMULSION INTRAVENOUS at 12:24

## 2020-01-18 RX ADMIN — POLYETHYLENE GLYCOL 3350, SODIUM SULFATE ANHYDROUS, SODIUM BICARBONATE, SODIUM CHLORIDE, POTASSIUM CHLORIDE 4000 ML: 236; 22.74; 6.74; 5.86; 2.97 POWDER, FOR SOLUTION ORAL at 04:15

## 2020-01-18 RX ADMIN — PROPOFOL 40 MG: 10 INJECTION, EMULSION INTRAVENOUS at 12:31

## 2020-01-18 RX ADMIN — ALPRAZOLAM 0.25 MG: 0.25 TABLET ORAL at 13:40

## 2020-01-18 RX ADMIN — PROPOFOL 50 MG: 10 INJECTION, EMULSION INTRAVENOUS at 12:39

## 2020-01-18 RX ADMIN — FENTANYL CITRATE 25 MCG: 50 INJECTION INTRAMUSCULAR; INTRAVENOUS at 12:42

## 2020-01-18 RX ADMIN — PROPOFOL 30 MG: 10 INJECTION, EMULSION INTRAVENOUS at 12:43

## 2020-01-18 RX ADMIN — SODIUM CHLORIDE: 9 INJECTION, SOLUTION INTRAVENOUS at 11:34

## 2020-01-18 RX ADMIN — PROPOFOL 50 MG: 10 INJECTION, EMULSION INTRAVENOUS at 12:07

## 2020-01-18 ASSESSMENT — PAIN SCALES - GENERAL
PAINLEVEL_OUTOF10: 0

## 2020-01-18 ASSESSMENT — LIFESTYLE VARIABLES: SMOKING_STATUS: 1

## 2020-01-18 NOTE — BRIEF OP NOTE
BRIEF COLONOSCOPY REPORT:   Photos and full colonoscopy report available by going to \"chart review\" then \"procedures\" then  \"colonoscopy\" then \"View Endoscopy Report\"      Impression:    1) no active bleeding    2) small adherent clot inside a diverticulum at 30cm- coagulated with Gold Probe and one hemoclip placed   3) extensive sigmoid divertics to 35 cm but none beyond     4) 6 mm polyp removed from the lower ascending colon   5) small internal hemorrhoids      Suggest:   1) clear liquids and close monitoring

## 2020-01-18 NOTE — PROGRESS NOTES
Patient HGB has dropped from 9.1 to 5.7 just in the course of the day. Patient is still having frequent bloody stool. Dr. Jada Rivera and NP 13 [facility] notified. 2u of blood were ordered awaiting further instruction.

## 2020-01-18 NOTE — CONSULTS
Department of Internal Medicine  Gastroenterology Consult Note  Navid Cardenas MD      Reason for Consult:  Rectal bleeding    Primary Care Physician:  Stefan Irby MD    History Obtained From:  patient    HISTORY OF PRESENT ILLNESS:              The patient is a 68 y.o.  male admitted with bright red blood per rectum that started 2 days ago. He has a history of colon polyps and his last colonoscopy was by Dr Cecelia Fitch about 4 years ago. he denies abd pain. , melena, hematemesis. He is on ASA and Brillinta for a cardiac stent. He had a CTA last night which showed no active bleeding. His BUN on admission was 14 and this went down to 9 last night during active bleeding. Past Medical History:        Diagnosis Date    Anxiety     Cancer [facility])     prostate    COPD [facility]ch[facility] obstructive pulmonary disease) ([facility] [address]     Glaucoma     Hyperlipidemia     Hypertension        Past Surgical History:        Procedure Laterality Date    COLONOSCOPY      PROSTATE SURGERY  2011    radiation seeds implanted by Dr Katja Trammell         Medications Prior to Admission:    Prior to Admission medications    Medication Sig Start Date End Date Taking?  Authorizing Provider   brimonidine-timolol (COMBIGAN) 0.2-0.5 % ophthalmic solution Place 1 drop into both eyes every 12 hours   Yes Historical Provider, MD   tiotropium (SPIRIVA RESPIMAT) 2.5 MCG/ACT AERS inhaler Inhale 1 puff into the lungs daily   Yes Historical Provider, MD   Nutritional Supplements (JUICE PLUS FIBRE PO) Take 2 each by mouth 2 times daily   Yes Historical Provider, MD   metoprolol tartrate (LOPRESSOR) 50 MG tablet Take 1 tablet by mouth 2 times daily 9/14/16  Yes Cally Ncihols MD   [facility]) 90 MG TABS tablet Take 1 tablet by mouth 2 times daily 9/14/16  Yes Cally Nichols MD   aspirin EC 81 MG EC tablet Take 1 tablet by mouth daily 9/14/16  Yes Cally Nichols MD   travoprost, benzalkonium, (TRAVATAN) 0.004 % ophthalmic solution Place 1 drop into

## 2020-01-18 NOTE — PROGRESS NOTES
Hospitalist Progress Note      Name:  Celestino Nguyen /Age/Sex:   (68 y.o. male)   MRN & CSN:  [id number] & [id number] Admission Date/Time: 2020 10:03 AM   Location:  - PCP: Stefan Irby MD         Hospital Day: 2    Assessment and Plan:   Celestino Nguyen is a 68 y.o.  male  who presents with BRBPR     1) GI Bleed; likely lower from diverticulosis  -Hgb 9.1 on admission which went down to 5.7  -Transfused with 2 units of PRBCs  -GI on board; for colonoscopy  -General Surgery on board  -Hold DAPT  -Continue PPI       Chronic  - HTN- Stable; cont BB, nifedipine, cozaar with parameters  - HLD- Statin  - Anxiety- xanax  - Glaucoma- continue home regimen  - COPD- Not O2 dependent; not in exacerbation; continue home meds  -CAD S/P Stents: last stent was - cardiology on board    Diet Diet NPO Time Specified   DVT Prophylaxis [] Lovenox, []  Heparin, [] SCDs, [] Ambulation   GI Prophylaxis [] PPI,  [] H2 Blocker,  [] Carafate,  [] Diet/Tube Feeds   Code Status Full Code   Disposition Home   MDM      History of Present Illness:     Patient was seen and examined  Denied any SOB, palpitations  No chest pain  Had large amount of bright red blood overnight; no melena reported. Ten point ROS reviewed negative, unless as noted above    Objective: Intake/Output Summary (Last 24 hours) at 2020 1016  Last data filed at 2020 0828  Gross per 24 hour   Intake 1809.5 ml   Output 350 ml   Net 1459.5 ml      Vitals:   Vitals:    20 0911   BP: (!) 128/56   Pulse: 93   Resp: 17   Temp:    SpO2: 97%     Physical Exam:   GEN Awake male, sitting upright in bed in no apparent distress. Appears given age. EYES Pupils are equally round. No scleral erythema, discharge, or conjunctivitis. HENT Mucous membranes are moist. Oral pharynx without exudates, no evidence of thrush. NECK Supple, no apparent thyromegaly or masses. RESP Clear to auscultation, no wheezes, rales or rhonchi. Symmetric chest movement while on room air. CARDIO/VASC S1/S2 auscultated. Regular rate without appreciable murmurs, rubs, or gallops. No JVD or carotid bruits. Peripheral pulses equal bilaterally and palpable. No peripheral edema. GI Abdomen is soft without significant tenderness, masses, or guarding. Bowel sounds are normoactive. Rectal exam deferred.  No costovertebral angle tenderness. Normal appearing external genitalia. Giron catheter is not present. HEME/LYMPH No palpable cervical lymphadenopathy and no hepatosplenomegaly. No petechiae or ecchymoses. MSK No gross joint deformities. SKIN Normal coloration, warm, dry. NEURO Cranial nerves appear grossly intact, normal speech, no lateralizing weakness. PSYCH Awake, alert, oriented x 4. Affect appropriate.     Medications:   Medications:    magnesium sulfate  2 g Intravenous Once    pantoprazole  40 mg Intravenous BID    atorvastatin  40 mg Oral Daily    fenofibrate  160 mg Oral Daily    losartan  25 mg Oral Daily    metoprolol tartrate  50 mg Oral BID    NIFEdipine  60 mg Oral Daily    tiotropium  18 mcg Inhalation Daily    nicotine  1 patch Transdermal Daily    sodium chloride flush  10 mL Intravenous 2 times per day    thiamine  100 mg Oral Daily    folic acid  1 mg Oral Daily    Travoprost (CHARITO Free)  1 drop Left Eye Nightly    brinzolamide  1 drop Left Eye TID    brimonidine-timolol  1 drop Both Eyes Q12H    0.9 % sodium chloride  250 mL Intravenous Once    sodium chloride  20 mL Intravenous Once      Infusions:    sodium chloride 100 mL/hr at 01/18/20 0141     PRN Meds: magnesium hydroxide, 30 mL, Daily PRN  ondansetron, 4 mg, Q6H PRN  acetaminophen, 650 mg, Q4H PRN  ALPRAZolam, 0.25 mg, Q6H PRN  sodium chloride flush, 10 mL, PRN  LORazepam, 1 mg, Q1H PRN    Or  LORazepam, 1 mg, Q1H PRN    Or  LORazepam, 2 mg, Q1H PRN    Or  LORazepam, 2 mg, Q1H PRN    Or  LORazepam, 3 mg, Q1H PRN    Or  LORazepam, 3 mg, Q1H PRN Or  LORazepam, 4 mg, Q1H PRN    Or  LORazepam, 4 mg, Q1H PRN          Electronically signed by Mainor Vazquez MD on 1/18/2020 at 10:16 AM

## 2020-01-18 NOTE — ANESTHESIA PRE PROCEDURE
Jena Cabrera MD   1 patch at 01/18/20 0757    sodium chloride flush 0.9 % injection 10 mL  10 mL Intravenous 2 times per day Alina Wayne MD   10 mL at 01/18/20 0828    sodium chloride flush 0.9 % injection 10 mL  10 mL Intravenous PRN Alina Wayne MD        vitamin B-1 (THIAMINE) tablet 100 mg  100 mg Oral Daily Wilber PALOMINO MD   100 mg at 72/79/82 8243    folic acid (FOLVITE) tablet 1 mg  1 mg Oral Daily Wilber PALOMINO MD   1 mg at 01/17/20 1628    LORazepam (ATIVAN) tablet 1 mg  1 mg Oral Q1H PRN Alina Wayne MD        Or    LORazepam (ATIVAN) injection 1 mg  1 mg Intravenous Q1H PRN Alina Wayne MD        Or    LORazepam (ATIVAN) tablet 2 mg  2 mg Oral Q1H PRN Alina Wayne MD        Or    LORazepam (ATIVAN) injection 2 mg  2 mg Intravenous Q1H PRN Alina Wayne MD        Or    LORazepam (ATIVAN) tablet 3 mg  3 mg Oral Q1H PRN Alina Wayne MD        Or    LORazepam (ATIVAN) injection 3 mg  3 mg Intravenous Q1H PRN Alina Wayne MD        Or    LORazepam (ATIVAN) tablet 4 mg  4 mg Oral Q1H PRN Alina Wayne MD        Or    LORazepam (ATIVAN) injection 4 mg  4 mg Intravenous Q1H PRN Alina Wayne MD        Travoprost (CHARITO Free) (TRAVATAN Z) 0.004 % ophthalmic solution SOLN 1 drop  1 drop Left Eye Nightly Wilber PALOMINO MD   1 drop at 01/17/20 2351    brinzolamide (AZOPT) 1 % ophthalmic suspension 1 drop  1 drop Left Eye TID Alina Wayne MD   1 drop at 01/18/20 0757    brimonidine-timolol (COMBIGAN) 0.2-0.5 % ophthalmic solution 1 drop  1 drop Both Eyes Q12H Wilber PALOMINO MD   1 drop at 01/18/20 0826    0.9 % sodium chloride infusion 250 mL  250 mL Intravenous Once Donna Dapilah, APRN - CNP        0.9 % sodium chloride bolus  20 mL Intravenous Once Adiel Pabon MD           Allergies:  No Known Allergies    Problem List:    Patient Active Problem List   Diagnosis Code    NSTEMI (non-ST elevated myocardial infarction) ([facility] I21.4    COPD (chronic obstructive pulmonary disease) (HCC) J44.9    Essential hypertension I10    Hyperlipidemia E78.5    Glaucoma H40.9    Anxiety F41.9    BRBPR (bright red blood per rectum) K62.5       Past Medical History:        Diagnosis Date    Anxiety     Cancer [facility])     prostate    COPD (chronic obstructive pulmonary disease) ([facility]     Glaucoma     Hyperlipidemia     Hypertension        Past Surgical History:        Procedure Laterality Date    COLONOSCOPY      PROSTATE SURGERY  2011    radiation seeds implanted by Dr Lavetta Fleischer History:    Social History     Tobacco Use    Smoking status: Current Every Day Smoker     Packs/day: 1.50     Types: Cigarettes    Smokeless tobacco: Never Used   Substance Use Topics    Alcohol use: Yes     Alcohol/week: 4.0 - 5.0 standard drinks     Types: 4 - 5 Shots of liquor per week     Comment: mixed drinks per day, whiskey                                Ready to quit: No  Counseling given: Yes      Vital Signs (Current):   Vitals:    01/18/20 0832 01/18/20 0911 01/18/20 1132 01/18/20 1135   BP: (!) 157/67 (!) 128/56 (!) 142/64 (!) 142/64   Pulse: 94 93 92 93   Resp: 22 17 16 21   Temp:    37.2 °C (99 °F)   TempSrc:    Oral   SpO2: 97% 97% 97% 97%   Weight:       Height:                                                  BP Readings from Last 3 Encounters:   01/18/20 (!) 142/64   09/14/16 173/87       NPO Status:                                                                                 BMI:   Wt Readings from Last 3 Encounters:   01/17/20 155 lb (70.3 kg)   09/14/16 169 lb (76.7 kg)     Body mass index is 23.57 kg/m².     CBC:   Lab Results   Component Value Date    WBC 10.3 01/17/2020    RBC 2.85 01/17/2020    HGB 7.9 01/18/2020    HCT 23.6 01/18/2020    MCV 98.2 01/17/2020    RDW 12.7 01/17/2020     01/17/2020       CMP:   Lab Results   Component Value Date     01/18/2020    K 4.3 01/18/2020  01/18/2020    CO2 24 01/18/2020    BUN 9 01/18/2020    CREATININE 0.7 01/18/2020    GFRAA >60 01/18/2020    LABGLOM >60 01/18/2020    GLUCOSE 96 01/18/2020    PROT 5.8 01/17/2020    CALCIUM 7.9 01/18/2020    BILITOT 0.4 01/17/2020    ALKPHOS 48 01/17/2020    AST 19 01/17/2020    ALT 13 01/17/2020       POC Tests: No results for input(s): POCGLU, POCNA, POCK, POCCL, POCBUN, POCHEMO, POCHCT in the last 72 hours. Coags:   Lab Results   Component Value Date    PROTIME 11.1 01/17/2020    PROTIME 9.8 09/28/2011    INR 0.92 01/17/2020    APTT 25.8 09/12/2016       HCG (If Applicable): No results found for: PREGTESTUR, PREGSERUM, HCG, HCGQUANT     ABGs: No results found for: PHART, PO2ART, PXM1GHV, EDH1WGS, BEART, Y2DGUTKQ     Type & Screen (If Applicable):  No results found for: LABABO, [address]    Anesthesia Evaluation  Patient summary reviewed  Airway: Mallampati: III        Dental:          Pulmonary:   (+) COPD:  current smoker                           Cardiovascular:    (+) hypertension:, past MI:,                   Neuro/Psych:               GI/Hepatic/Renal:   (+) bowel prep,           Endo/Other:                     Abdominal:           Vascular:                                        Anesthesia Plan      MAC     ASA 3       Induction: intravenous. Anesthetic plan and risks discussed with patient. Plan discussed with attending.                   WINNIE Holman - CRNA   1/18/2020

## 2020-01-18 NOTE — H&P
I have examined the patient within 24 hours  before the procedure and there is no change in the previous history and physical exam,which has been reviewed. There is no history of sleep apnea, snoring, or stridor. There has been no  previous adverse experience with sedation/anesthesia. There is no increased risk for aspiration of gastric contents. The patient has been instructed that all resuscitative measures (during the operative and immediate perioperative period) will be instituted in the unlikely event that they will be needed. The patient has no pertinent past surgical or FH other than listed in the original H&P.     ASA Class: 3  AIRWAY Class: 1    Consent form signed, witnessed and in soft chart

## 2020-01-18 NOTE — CONSULTS
[address], [address] [facility]                                  CONSULTATION    PATIENT NAME: Ana Miles                      :        1950  MED REC NO:   [id number]                          ROOM:  ACCOUNT NO:   [de-identified]                           ADMIT DATE: 2020  PROVIDER:     Jesus José MD    CONSULT DATE:  2020    INDICATION:  Coronary artery disease. HISTORY OF PRESENT ILLNESS:  This is a 66-year-old male patient who came  in the hospital yesterday with having GI bleed. He has been having  rectal bleeding for four days. He is known to have coronary artery  disease present. The patient was seen by Dr. Ester Poole. The patient is  scheduled for a colonoscopy today. The patient denies any fevers and  chills. No cough or sputum production. No other  or GI complaints  present except as described above. The patient has been on Brilinta  also. The patient is hemodynamically stable at this time. Denies any chest  pain. The patient had an echo done back in 2016. Echo showed LV function was  preserved at that time. The patient is known to have coronary artery disease, status post  angioplasty done in 2016. Left main was patent, LAD mid 40% stenosis  noted. Circ, ramus, and OM had mild disease. RCA was 100% occluded,  which was stented with four drug-eluting stents. PAST MEDICAL HISTORY:  History of coronary artery disease, status post  angioplasty done of the RCA. Hypertension, hyperlipidemia, diabetes,  anxiety, and glaucoma present. PAST SURGICAL HISTORY:  Colonoscopy done in the past and angioplasty in  2016. SOCIAL HISTORY:  He does not smoke, does not drink. ALLERGIES:  NKDA. MEDICATION AT HOME:  He is on inhalers, Lopressor 50 mg b.i.d., Brilinta  90 mg b.i.d., Lipitor, TriCor, Cozaar, and Procardia.     PHYSICAL EXAMINATION:  GENERAL:  The patient is awake and alert,

## 2020-01-19 PROBLEM — K57.31 DIVERTICULOSIS OF COLON WITH HEMORRHAGE: Status: ACTIVE | Noted: 2020-01-17

## 2020-01-19 LAB
ANION GAP SERPL CALCULATED.3IONS-SCNC: 9 MMOL/L (ref 4–16)
BUN BLDV-MCNC: 3 MG/DL (ref 6–23)
CALCIUM SERPL-MCNC: 8.5 MG/DL (ref 8.3–10.6)
CHLORIDE BLD-SCNC: 99 MMOL/L (ref 99–110)
CO2: 28 MMOL/L (ref 21–32)
CREAT SERPL-MCNC: 0.6 MG/DL (ref 0.9–1.3)
GFR AFRICAN AMERICAN: >60 ML/MIN/1.73M2
GFR NON-AFRICAN AMERICAN: >60 ML/MIN/1.73M2
GLUCOSE BLD-MCNC: 109 MG/DL (ref 70–99)
HCT VFR BLD CALC: 28.5 % (ref 42–52)
HEMOGLOBIN: 9.2 GM/DL (ref 13.5–18)
MAGNESIUM: 1.7 MG/DL (ref 1.8–2.4)
POTASSIUM SERPL-SCNC: 4 MMOL/L (ref 3.5–5.1)
SODIUM BLD-SCNC: 136 MMOL/L (ref 135–145)

## 2020-01-19 PROCEDURE — 36430 TRANSFUSION BLD/BLD COMPNT: CPT

## 2020-01-19 PROCEDURE — 83735 ASSAY OF MAGNESIUM: CPT

## 2020-01-19 PROCEDURE — 6370000000 HC RX 637 (ALT 250 FOR IP): Performed by: SPECIALIST

## 2020-01-19 PROCEDURE — 2580000003 HC RX 258: Performed by: SPECIALIST

## 2020-01-19 PROCEDURE — 85018 HEMOGLOBIN: CPT

## 2020-01-19 PROCEDURE — 1200000000 HC SEMI PRIVATE

## 2020-01-19 PROCEDURE — 6360000002 HC RX W HCPCS: Performed by: INTERNAL MEDICINE

## 2020-01-19 PROCEDURE — 94761 N-INVAS EAR/PLS OXIMETRY MLT: CPT

## 2020-01-19 PROCEDURE — 85014 HEMATOCRIT: CPT

## 2020-01-19 PROCEDURE — 80048 BASIC METABOLIC PNL TOTAL CA: CPT

## 2020-01-19 PROCEDURE — 94640 AIRWAY INHALATION TREATMENT: CPT

## 2020-01-19 RX ORDER — MAGNESIUM SULFATE IN WATER 40 MG/ML
2 INJECTION, SOLUTION INTRAVENOUS ONCE
Status: COMPLETED | OUTPATIENT
Start: 2020-01-19 | End: 2020-01-19

## 2020-01-19 RX ORDER — 0.9 % SODIUM CHLORIDE 0.9 %
20 INTRAVENOUS SOLUTION INTRAVENOUS ONCE
Status: DISCONTINUED | OUTPATIENT
Start: 2020-01-19 | End: 2020-01-20 | Stop reason: HOSPADM

## 2020-01-19 RX ORDER — PANTOPRAZOLE SODIUM 40 MG/1
40 TABLET, DELAYED RELEASE ORAL
Status: DISCONTINUED | OUTPATIENT
Start: 2020-01-19 | End: 2020-01-20 | Stop reason: HOSPADM

## 2020-01-19 RX ADMIN — FOLIC ACID 1 MG: 1 TABLET ORAL at 08:55

## 2020-01-19 RX ADMIN — BRINZOLAMIDE 1 DROP: 10 SUSPENSION/ DROPS OPHTHALMIC at 19:44

## 2020-01-19 RX ADMIN — PANTOPRAZOLE SODIUM 40 MG: 40 TABLET, DELAYED RELEASE ORAL at 09:08

## 2020-01-19 RX ADMIN — NIFEDIPINE 60 MG: 30 TABLET, FILM COATED, EXTENDED RELEASE ORAL at 08:55

## 2020-01-19 RX ADMIN — LOSARTAN POTASSIUM 25 MG: 25 TABLET, FILM COATED ORAL at 08:53

## 2020-01-19 RX ADMIN — TRAVOPROST OPHTHALMIC SOLUTION 1 DROP: 0.04 SOLUTION OPHTHALMIC at 19:48

## 2020-01-19 RX ADMIN — BRIMONIDINE TARTRATE, TIMOLOL MALEATE 1 DROP: 2; 5 SOLUTION/ DROPS OPHTHALMIC at 19:48

## 2020-01-19 RX ADMIN — BRIMONIDINE TARTRATE, TIMOLOL MALEATE 1 DROP: 2; 5 SOLUTION/ DROPS OPHTHALMIC at 07:58

## 2020-01-19 RX ADMIN — FENOFIBRATE 160 MG: 160 TABLET ORAL at 08:56

## 2020-01-19 RX ADMIN — ALPRAZOLAM 0.25 MG: 0.25 TABLET ORAL at 08:54

## 2020-01-19 RX ADMIN — ALPRAZOLAM 0.25 MG: 0.25 TABLET ORAL at 18:54

## 2020-01-19 RX ADMIN — METOPROLOL TARTRATE 50 MG: 50 TABLET, FILM COATED ORAL at 19:41

## 2020-01-19 RX ADMIN — BRINZOLAMIDE 1 DROP: 10 SUSPENSION/ DROPS OPHTHALMIC at 07:55

## 2020-01-19 RX ADMIN — METOPROLOL TARTRATE 50 MG: 50 TABLET, FILM COATED ORAL at 08:54

## 2020-01-19 RX ADMIN — Medication 100 MG: at 08:54

## 2020-01-19 RX ADMIN — SODIUM CHLORIDE, PRESERVATIVE FREE 10 ML: 5 INJECTION INTRAVENOUS at 19:43

## 2020-01-19 RX ADMIN — BRINZOLAMIDE 1 DROP: 10 SUSPENSION/ DROPS OPHTHALMIC at 15:25

## 2020-01-19 RX ADMIN — MAGNESIUM SULFATE HEPTAHYDRATE 2 G: 40 INJECTION, SOLUTION INTRAVENOUS at 14:02

## 2020-01-19 RX ADMIN — ATORVASTATIN CALCIUM 40 MG: 40 TABLET, FILM COATED ORAL at 08:54

## 2020-01-19 RX ADMIN — TIOTROPIUM BROMIDE 18 MCG: 18 CAPSULE ORAL; RESPIRATORY (INHALATION) at 12:56

## 2020-01-19 RX ADMIN — SODIUM CHLORIDE, PRESERVATIVE FREE 10 ML: 5 INJECTION INTRAVENOUS at 08:56

## 2020-01-19 ASSESSMENT — PAIN SCALES - GENERAL
PAINLEVEL_OUTOF10: 0

## 2020-01-19 NOTE — PROGRESS NOTES
1030  Pt transferred to room 4119 via WC with portable monitor. Belongings taken. No c/o or distress noted. Update given to Kayleen HERMOSILLO at bedside.

## 2020-01-19 NOTE — PROGRESS NOTES
Inhalation Daily    nicotine  1 patch Transdermal Daily    sodium chloride flush  10 mL Intravenous 2 times per day    thiamine  100 mg Oral Daily    folic acid  1 mg Oral Daily    Travoprost (CHARITO Free)  1 drop Left Eye Nightly    brinzolamide  1 drop Left Eye TID    brimonidine-timolol  1 drop Both Eyes Q12H    0.9 % sodium chloride  250 mL Intravenous Once    sodium chloride  20 mL Intravenous Once      Infusions:     PRN Meds:  magnesium hydroxide, ondansetron, acetaminophen, ALPRAZolam, sodium chloride flush, LORazepam **OR** LORazepam **OR** LORazepam **OR** LORazepam **OR** LORazepam **OR** LORazepam **OR** LORazepam **OR** LORazepam       Physical Exam:  Vitals:    01/19/20 1300   BP:    Pulse:    Resp:    Temp:    SpO2: 92%        General: AAO, NAD  Chest: Nontender  Cardiac: First and Second Heart Sounds are Normal, No Murmurs or Gallops noted  Lungs:Clear to auscultation and percussion. Abdomen: Soft, NT, ND, +BS  Extremities: No clubbing, no edema  Vascular:  Equal 2+ peripheral pulses. Lab Data:  CBC:   Recent Labs     01/17/20  1011  01/18/20  1847 01/18/20  2335 01/19/20  0610   WBC 10.3  --   --   --   --    HGB 9.1*   < > 8.0* 7.1* 9.2*   HCT 28.0*   < > 24.3* 21.7* 28.5*   MCV 98.2  --   --   --   --    *  --   --   --   --     < > = values in this interval not displayed. BMP:   Recent Labs     01/17/20  1011 01/17/20 2005 01/18/20  0240 01/19/20  0610   *  --  133* 136   K 4.1  --  4.3 4.0   CL 95*  --  102 99   CO2 23  --  24 28   BUN 14 12 9 3*   CREATININE 1.0  --  0.7* 0.6*     LIVER PROFILE:   Recent Labs     01/17/20  1011   AST 19   ALT 13   BILITOT 0.4   ALKPHOS 48     PT/INR:   Recent Labs     01/17/20  1011   PROTIME 11.1*   INR 0.92     APTT: No results for input(s): APTT in the last 72 hours. BNP:  No results for input(s): BNP in the last 72 hours.       Assessment:  Patient Active Problem List    Diagnosis Date Noted    NSTEMI (non-ST elevated

## 2020-01-19 NOTE — PROGRESS NOTES
PRN    Or  LORazepam, 4 mg, Q1H PRN    Or  LORazepam, 4 mg, Q1H PRN          Electronically signed by Rubin Hawk MD on 1/19/2020 at 1:22 PM

## 2020-01-19 NOTE — PROGRESS NOTES
tensive Care Unit  Medical Intensive Care Unit  Attending Progress Note      Patient was seen and evaluated by Dr. Page Troncoso. Patient is doing much better. No acute events last night. No further bloody BM's, passing flatus. Reports no abdominal pain either. Has received a total of 3 units PRBC's. Hgb at  9.2 now. C-scope per Dr. Marsha Scott yesterday revealed:    Impression:              1) no active bleeding              2) small adherent clot inside a diverticulum at 30cm- coagulated with Gold Probe and one hemoclip placed             3) extensive sigmoid divertics to 35 cm but none beyond               4) 6 mm polyp removed from the lower ascending colon             5) small internal hemorrhoids      Team members present on rounds:  Nursing and Patient    ICU guidelines/prophylaxis active for the following:    head above bed above 30 degrees    Patient Examined?   yes    Additions/differences/highlights to the resident's/fellow's exam:  VITALS:  /77   Pulse 69   Temp 98.1 °F (36.7 °C) (Oral)   Resp 20   Ht 5' 8\" (1.727 m)   Wt 160 lb 7.9 oz (72.8 kg)   SpO2 93%   BMI 24.40 kg/m²   CURRENT TEMPERATURE:  Temp: 98.1 °F (36.7 °C)  MAXIMUM TEMPERATURE OVER 24HRS:  Temp (24hrs), Av.2 °F (36.8 °C), Min:98 °F (36.7 °C), Max:99 °F (37.2 °C)    TEMPERATURE RANGE OVER 24HRS:   Temp  Av.2 °F (36.8 °C)  Min: 98 °F (36.7 °C)  Max: 99 °F (37.2 °C)  24HR RESPIRATORY RATE RANGE:  Resp  Av.2  Min: 16  Max: 25  24HR PULSE RANGE: Pulse  Av.2  Min: 65  Max: 108  24HR BLOOD PRESSURE RANGE:  Systolic (24QDA), WR , Min:93 , ZVX:082   ; Diastolic (86UWI), DEM:92, Min:39, Max:85    24HR PULSE OXIMETRY RANGE:  SpO2  Av.3 %  Min: 91 %  Max: 100 %  24HR INTAKE/OUTPUT:    Intake/Output Summary (Last 24 hours) at 2020 1014  Last data filed at 2020 0930  Gross per 24 hour   Intake 1751.25 ml   Output 1000 ml   Net 751.25 ml     CONSTITUTIONAL:  awake, alert, cooperative, no apparent distress, and

## 2020-01-19 NOTE — CONSULTS
in the past.  He also has a  history of coronary artery disease with percutaneous coronary  intervention and he has five drug-eluting stents placed in the right  coronary artery. I did look at the last operative note by Dr. Ray Haynes who is in the interventional cardiologist and the last procedure  was performed around 09/2016. He has been on aspirin along with  Brilinta for therapy regarding his coronary artery disease and the stent  placement. Further questioning reveals the patient actually quit taking  the Brilinta and the aspirin couple days ago. He is not reporting any  melena, hematemesis or hematochezia. He is able to answer all my  questions at this time. He is in no acute distress and he is currently  drinking GOLYTELY for his mechanical bowel preparation because Dr. Marsha Scott plans on doing a colonoscopy around 11:30 this a.m. for further  evaluation. We suspect this bleeding is secondary to a diverticular  source. He is not reporting any abdominal pain. He does have some  intermittent nausea. PAST MEDICAL HISTORY:  History of prostate cancer, anxiety, glaucoma,  hyperlipidemia, essential hypertension, COPD. PAST SURGICAL HISTORY:  Colonoscopy, he has radioactive seeds planted by  Dr. Bassam Chery in 2011. He has also had tonsillectomy. No abdominal  surgeries. MEDICATIONS:  Please refer to electronic health record. ALLERGIES:  No known drug allergies. SOCIAL HISTORY:  Positive for tobacco and also occasional alcohol use. FAMILY HISTORY:  Noncontributory. PHYSICAL EXAMINATION:  VITAL SIGNS:  His temperature is 97.7, respirations 17, pulse 93, blood  pressure 128/56, O2 sat 97% on room air. GENERAL:  Generally speaking, no acute distress. Alert, awake, oriented  x3. HEAD, EARS, EYES, NOSE AND THROAT:  Normocephalic, atraumatic. Pupils  equal, round,  reactive to light. Extraocular movements intact. Trachea is midline. No lymphadenopathy. Anicteric sclerae. Moist  mucous membranes. NECK:  Supple. No lymphadenopathy. No thyroid nodules. No JVD or  bruits. HEART:  Positive S1, S2.  Normal sinus rhythm. No murmurs, rubs or  gallops. CHEST:  Clear to auscultation bilaterally. Air entry is bilaterally  equal.  No rales, wheezes or crackles. No accessory use of muscles for  respiration. ABDOMEN:  His abdomen is soft, nondistended. No rebound or guarding. No herniations, pulsations or fluid shifts. No organomegaly. No CVA  tenderness. No skin rashes or abdominal wall masses. EXTREMITIES:  Negative for erythema, edema, cellulitis or cyanosis. 2+  palpable pulses in the upper and lower extremities. No venous or  arterial ulcerations. Good capillary refill. ASSESSMENT/PLAN:  The patient is a very pleasant 64-year-old   male who presents with bright blood per rectum. CTA of the abdomen and  pelvis has revealed no evidence of any acute GI bleeding. At this time,  I do agree with Dr. Sav Schaeffer that this is likely a  diverticular source and on imaging he does have evidence for sigmoid  diverticulosis. He has had no prior episodes of lower GI bleeding or  upper GI bleeding. I do agree with Dr. Primo Rose that this is unlikely an  upper GI source. We will continue to hold all of his anticoagulants. He will get the rest of his bowel prep and proceed with colonoscopy and  if there is any active GI bleeding noted, Dr. Primo Rose will  therapeutically control this. If his colonoscopy is negative and he  continues to bleed and he has active episodes with hemodynamic  instability and we cannot rule in a direct area of the bleeding, he will  need a tagged RBC scan with the possibility of a diagnostic and  therapeutic abdominal angiogram.  We will continue to have this patient  n.p.o., follow serial H and H and also have 2 units on hold as needed. We will continue close observation of this patient.     I would like to thank Dr. Primo Rose for giving

## 2020-01-19 NOTE — PROGRESS NOTES
5194  Report called to [facility]. Questions answered. Belongings gathered. Pt educated on transfer to room [phone]. Questions answered.

## 2020-01-20 VITALS
OXYGEN SATURATION: 92 % | RESPIRATION RATE: 18 BRPM | HEIGHT: 68 IN | BODY MASS INDEX: 23.04 KG/M2 | WEIGHT: 152 LBS | HEART RATE: 75 BPM | TEMPERATURE: 98.4 F | DIASTOLIC BLOOD PRESSURE: 60 MMHG | SYSTOLIC BLOOD PRESSURE: 124 MMHG

## 2020-01-20 LAB
ANION GAP SERPL CALCULATED.3IONS-SCNC: 8 MMOL/L (ref 4–16)
BUN BLDV-MCNC: 3 MG/DL (ref 6–23)
CALCIUM SERPL-MCNC: 8.2 MG/DL (ref 8.3–10.6)
CHLORIDE BLD-SCNC: 101 MMOL/L (ref 99–110)
CO2: 28 MMOL/L (ref 21–32)
CREAT SERPL-MCNC: 0.7 MG/DL (ref 0.9–1.3)
GFR AFRICAN AMERICAN: >60 ML/MIN/1.73M2
GFR NON-AFRICAN AMERICAN: >60 ML/MIN/1.73M2
GLUCOSE BLD-MCNC: 102 MG/DL (ref 70–99)
HCT VFR BLD CALC: 28.3 % (ref 42–52)
HCT VFR BLD CALC: 28.3 % (ref 42–52)
HEMOGLOBIN: 9.2 GM/DL (ref 13.5–18)
HEMOGLOBIN: 9.3 GM/DL (ref 13.5–18)
MAGNESIUM: 1.9 MG/DL (ref 1.8–2.4)
POTASSIUM SERPL-SCNC: 3.7 MMOL/L (ref 3.5–5.1)
SODIUM BLD-SCNC: 137 MMOL/L (ref 135–145)

## 2020-01-20 PROCEDURE — 6370000000 HC RX 637 (ALT 250 FOR IP): Performed by: SPECIALIST

## 2020-01-20 PROCEDURE — 85018 HEMOGLOBIN: CPT

## 2020-01-20 PROCEDURE — 2580000003 HC RX 258: Performed by: SPECIALIST

## 2020-01-20 PROCEDURE — 36415 COLL VENOUS BLD VENIPUNCTURE: CPT

## 2020-01-20 PROCEDURE — 83735 ASSAY OF MAGNESIUM: CPT

## 2020-01-20 PROCEDURE — 94640 AIRWAY INHALATION TREATMENT: CPT

## 2020-01-20 PROCEDURE — 80048 BASIC METABOLIC PNL TOTAL CA: CPT

## 2020-01-20 PROCEDURE — 85014 HEMATOCRIT: CPT

## 2020-01-20 RX ORDER — PANTOPRAZOLE SODIUM 40 MG/1
40 TABLET, DELAYED RELEASE ORAL
Qty: 30 TABLET | Refills: 1 | Status: SHIPPED | OUTPATIENT
Start: 2020-01-20

## 2020-01-20 RX ORDER — ASPIRIN 81 MG/1
81 TABLET ORAL DAILY
Qty: 30 TABLET | Refills: 1 | Status: SHIPPED | OUTPATIENT
Start: 2020-01-20

## 2020-01-20 RX ORDER — LANOLIN ALCOHOL/MO/W.PET/CERES
100 CREAM (GRAM) TOPICAL DAILY
Qty: 30 TABLET | Refills: 3 | Status: SHIPPED | OUTPATIENT
Start: 2020-01-20

## 2020-01-20 RX ORDER — FOLIC ACID 1 MG/1
1 TABLET ORAL DAILY
Qty: 30 TABLET | Refills: 3 | Status: ON HOLD | OUTPATIENT
Start: 2020-01-21 | End: 2022-01-01

## 2020-01-20 RX ORDER — LANOLIN ALCOHOL/MO/W.PET/CERES
325 CREAM (GRAM) TOPICAL 2 TIMES DAILY
Qty: 90 TABLET | Refills: 3 | Status: ON HOLD | OUTPATIENT
Start: 2020-01-20 | End: 2022-01-01

## 2020-01-20 RX ADMIN — TIOTROPIUM BROMIDE 18 MCG: 18 CAPSULE ORAL; RESPIRATORY (INHALATION) at 10:29

## 2020-01-20 RX ADMIN — BRIMONIDINE TARTRATE, TIMOLOL MALEATE 1 DROP: 2; 5 SOLUTION/ DROPS OPHTHALMIC at 10:07

## 2020-01-20 RX ADMIN — FENOFIBRATE 160 MG: 160 TABLET ORAL at 10:03

## 2020-01-20 RX ADMIN — BRINZOLAMIDE 1 DROP: 10 SUSPENSION/ DROPS OPHTHALMIC at 10:07

## 2020-01-20 RX ADMIN — FOLIC ACID 1 MG: 1 TABLET ORAL at 10:03

## 2020-01-20 RX ADMIN — SODIUM CHLORIDE, PRESERVATIVE FREE 10 ML: 5 INJECTION INTRAVENOUS at 10:09

## 2020-01-20 RX ADMIN — METOPROLOL TARTRATE 50 MG: 50 TABLET, FILM COATED ORAL at 10:03

## 2020-01-20 RX ADMIN — Medication 100 MG: at 14:54

## 2020-01-20 RX ADMIN — ALPRAZOLAM 0.25 MG: 0.25 TABLET ORAL at 10:03

## 2020-01-20 RX ADMIN — BRINZOLAMIDE 1 DROP: 10 SUSPENSION/ DROPS OPHTHALMIC at 16:30

## 2020-01-20 RX ADMIN — LOSARTAN POTASSIUM 25 MG: 25 TABLET, FILM COATED ORAL at 10:03

## 2020-01-20 RX ADMIN — ATORVASTATIN CALCIUM 40 MG: 40 TABLET, FILM COATED ORAL at 10:03

## 2020-01-20 RX ADMIN — NIFEDIPINE 60 MG: 30 TABLET, FILM COATED, EXTENDED RELEASE ORAL at 10:03

## 2020-01-20 RX ADMIN — PANTOPRAZOLE SODIUM 40 MG: 40 TABLET, DELAYED RELEASE ORAL at 05:48

## 2020-01-20 RX ADMIN — ALPRAZOLAM 0.25 MG: 0.25 TABLET ORAL at 16:30

## 2020-01-20 ASSESSMENT — PAIN SCALES - GENERAL: PAINLEVEL_OUTOF10: 0

## 2020-01-20 NOTE — PROGRESS NOTES
Pt seen and examined. Doing well. AF VSS. No further LGI bleeding. abd is benign on exam.   S/p endoscopic control of diverticular bleed. CAD. Pt will be put back on ASA only for next 2 weeks. Discussed with Dr. Loly Us. Low fat diet. Discharge today. F/U with me as needed.

## 2020-01-20 NOTE — PLAN OF CARE
Problem: SAFETY  Goal: Free from accidental physical injury  1/20/2020 0158 by Kayla Rosa RN  Outcome: Ongoing  1/19/2020 1530 by Brenda Marcano RN  Outcome: Ongoing  Goal: Free from intentional harm  1/20/2020 0158 by Kayla Rosa RN  Outcome: Ongoing  1/19/2020 1530 by Brenda Marcano RN  Outcome: Ongoing     Problem: DAILY CARE  Goal: Daily care needs are met  1/20/2020 0158 by Kayla Rsoa RN  Outcome: Ongoing  1/19/2020 1530 by Brenda Marcano RN  Outcome: Ongoing     Problem: PAIN  Goal: Patient's pain/discomfort is manageable  1/20/2020 0158 by Kayla Rosa RN  Outcome: Ongoing  1/19/2020 1530 by Brenda Marcano RN  Outcome: Ongoing     Problem: SKIN INTEGRITY  Goal: Skin integrity is maintained or improved  1/20/2020 0158 by Kayla Rosa RN  Outcome: Ongoing  1/19/2020 1530 by Brenda Marcano RN  Outcome: Ongoing     Problem: KNOWLEDGE DEFICIT  Goal: Patient/S.O. demonstrates understanding of disease process, treatment plan, medications, and discharge instructions.   1/20/2020 0158 by Kayla Rosa RN  Outcome: Ongoing  1/19/2020 1530 by Brenda Marcano RN  Outcome: Ongoing     Problem: DISCHARGE BARRIERS  Goal: Patient's continuum of care needs are met  1/20/2020 0158 by Kayla Rosa RN  Outcome: Ongoing  1/19/2020 1530 by Brenda Marcano RN  Outcome: Ongoing     Problem: Falls - Risk of:  Goal: Will remain free from falls  Description  Will remain free from falls  1/20/2020 0158 by Kayla Rosa RN  Outcome: Ongoing  1/19/2020 1530 by Brenda Marcano RN  Outcome: Ongoing  Goal: Absence of physical injury  Description  Absence of physical injury  1/20/2020 0158 by Kayla Rosa RN  Outcome: Ongoing  1/19/2020 1530 by Brenda Marcano RN  Outcome: Ongoing     Problem: Bleeding:  Goal: Will show no signs and symptoms of excessive bleeding  Description  Will show no signs and symptoms of excessive bleeding  1/20/2020 0158 by Neli Ernandez RN  Outcome: Ongoing  1/19/2020 1530 by Deonte Patel RN  Outcome: Ongoing     Problem: Discharge Planning:  Goal: Discharged to appropriate level of care  Description  Discharged to appropriate level of care  1/20/2020 0158 by eNli Ernandez RN  Outcome: Ongoing  1/19/2020 1530 by Deonte Patel RN  Outcome: Ongoing     Problem: Fluid Volume - Deficit:  Goal: Absence of fluid volume deficit signs and symptoms  Description  Absence of fluid volume deficit signs and symptoms  1/20/2020 0158 by Neli Ernandez RN  Outcome: Ongoing  1/19/2020 1530 by Deonte Patel RN  Outcome: Ongoing     Problem: Nutrition Deficit:  Goal: Ability to achieve adequate nutritional intake will improve  Description  Ability to achieve adequate nutritional intake will improve  1/20/2020 0158 by Neli Ernandez RN  Outcome: Ongoing  1/19/2020 1530 by Deonte Patel RN  Outcome: Ongoing     Problem: Sleep Pattern Disturbance:  Goal: Appears well-rested  Description  Appears well-rested  1/20/2020 0158 by Neli Ernandez RN  Outcome: Ongoing  1/19/2020 1530 by Deonte Patel RN  Outcome: Ongoing

## 2020-01-20 NOTE — PROGRESS NOTES
Stable   No further bleeding  Abdomen soft, non-tender, non-distended  Impression:               1) bleeding sigmoid diverticulosis- S/P endoscopic hemostasis- stable                 Plan:              1) advance to low fiber diet x 2-3 weeks then no restrictions              2) OK with me to discharge later today              3) repeat colonoscopy in 5 years due to polyp- otherwise does not need to see me unless having a problem

## 2020-01-20 NOTE — PROGRESS NOTES
Daily Progress Note    Ok for home from cardiac stand  Hx of CAD keep on ASA 81mg for now  Will evaluate as outpatient on brilianta   Hx of CAd  Hx of diverticular bleed  Need to quit smoking     Pt. Awake, alert and feeling well  HR stable, NSR, BP stable  No CP, SOB per pt.     GI bleed    S/p transfusion    GI following    S/p C-scope-polyp removed and Diverticulum coagulated    Hgb stable at 9.3-will recheck later in the day as well     CAD s/p PCI    PCI x5 to RCA in 2016     Holding Brilinta for now d/t anemia    Asymptomatic    Would restart Antiplatelets when stable-can do outpatient     Increase activity  Stable from cardiac standpoint-ok to D/C when ok with primary team  F/u at office in 2 weeks     [place] 2016  IMPRESSION:    1.  EDP was around 10 mmHg. 2.  The right coronary artery proximally was 100% occluded with SHRADDHA 0 flow  present.    3.  The left main is patent.    4.  Circumflex, OM1, ramus, and LAD all of them mild disease noted.  LAD has  mid myocardial bridging present with SHRADDHA III flow noted and diagonal branch  also patent.       PAST MEDICAL HISTORY:  History of coronary artery disease, status post  angioplasty done of the RCA.  Hypertension, hyperlipidemia, diabetes,  anxiety, and glaucoma present.     PAST SURGICAL HISTORY:  Colonoscopy done in the past and angioplasty in  2016.     SOCIAL HISTORY:  He does not smoke, does not drink.     ALLERGIES:  NKDA.     MEDICATION AT HOME:  He is on inhalers, Lopressor 50 mg b.i.d., Brilinta  90 mg b.i.d., Lipitor, TriCor, Cozaar, and Procardia.     Objective:   BP (!) 118/59   Pulse 82   Temp 98.2 °F (36.8 °C) (Oral)   Resp 17   Ht 5' 8\" (1.727 m)   Wt 152 lb (68.9 kg)   SpO2 93%   BMI 23.11 kg/m²       Intake/Output Summary (Last 24 hours) at 1/20/2020 1048  Last data filed at 1/19/2020 1529  Gross per 24 hour   Intake 50 ml   Output --   Net 50 ml       Medications:   Scheduled Meds:   sodium chloride  20 mL Intravenous Once    pantoprazole  40 mg Oral QAM AC    atorvastatin  40 mg Oral Daily    fenofibrate  160 mg Oral Daily    losartan  25 mg Oral Daily    metoprolol tartrate  50 mg Oral BID    NIFEdipine  60 mg Oral Daily    tiotropium  18 mcg Inhalation Daily    nicotine  1 patch Transdermal Daily    sodium chloride flush  10 mL Intravenous 2 times per day    thiamine  100 mg Oral Daily    folic acid  1 mg Oral Daily    Travoprost (CHARITO Free)  1 drop Left Eye Nightly    brinzolamide  1 drop Left Eye TID    brimonidine-timolol  1 drop Both Eyes Q12H    0.9 % sodium chloride  250 mL Intravenous Once    sodium chloride  20 mL Intravenous Once      Infusions:     PRN Meds:  magnesium hydroxide, ondansetron, acetaminophen, ALPRAZolam, sodium chloride flush, LORazepam **OR** LORazepam **OR** LORazepam **OR** LORazepam **OR** LORazepam **OR** LORazepam **OR** LORazepam **OR** LORazepam       Physical Exam:  Vitals:    01/20/20 0510   BP: (!) 118/59   Pulse: 82   Resp: 17   Temp: 98.2 °F (36.8 °C)   SpO2: 93%        General: AAO, NAD  Chest: Nontender  Cardiac: First and Second Heart Sounds are Normal, No Murmurs or Gallops noted  Lungs:Clear to auscultation and percussion. Abdomen: Soft, NT, ND, +BS  Extremities: No clubbing, no edema  Vascular:  Equal 2+ peripheral pulses. Lab Data:  CBC:   Recent Labs     01/18/20  2335 01/19/20  0610 01/20/20  0419   HGB 7.1* 9.2* 9.3*   HCT 21.7* 28.5* 28.3*     BMP:   Recent Labs     01/18/20  0240 01/19/20  0610 01/20/20  0419   * 136 137   K 4.3 4.0 3.7    99 101   CO2 24 28 28   BUN 9 3* 3*   CREATININE 0.7* 0.6* 0.7*     LIVER PROFILE: No results for input(s): AST, ALT, LIPASE, BILIDIR, BILITOT, ALKPHOS in the last 72 hours. Invalid input(s): AMYLASE,  ALB  PT/INR: No results for input(s): PROTIME, INR in the last 72 hours. APTT: No results for input(s): APTT in the last 72 hours.   BNP:  No results for input(s): BNP in the last 72 hours.      Assessment:  Patient Active Problem List    Diagnosis Date Noted    NSTEMI (non-ST elevated myocardial infarction) ([facility] 09/13/2016     Priority: High    COPD (chronic obstructive pulmonary disease) ([facility] 09/13/2016     Priority: Medium    Essential hypertension 09/13/2016     Priority: Medium    Hyperlipidemia 09/13/2016     Priority: Medium    Glaucoma 09/13/2016     Priority: Low    Anxiety 09/13/2016     Priority: Low    Diverticulosis of colon with hemorrhage 01/17/2020       Electronically signed by Kati Patel PA-C on 1/20/2020 at 10:48 AM

## 2020-01-20 NOTE — DISCHARGE SUMMARY
Sleep or Anxiety             aspirin EC 81 MG EC tablet  Take 1 tablet by mouth daily Resume after 2 weeks (02/03/2020)             atorvastatin (LIPITOR) 40 MG tablet  Take 40 mg by mouth daily             brimonidine-timolol (COMBIGAN) 0.2-0.5 % ophthalmic solution  Place 1 drop into both eyes every 12 hours             brinzolamide (AZOPT) 1 % ophthalmic suspension  Place 1 drop into the left eye 3 times daily              diphenoxylate-atropine (LOMOTIL) 2.5-0.025 MG per tablet  Take 2 tablets by mouth daily as needed for Diarrhea             fenofibrate (TRICOR) 145 MG tablet  Take 145 mg by mouth daily             ferrous sulfate (FE TABS) 325 (65 Fe) MG EC tablet  Take 1 tablet by mouth 2 times daily             folic acid (FOLVITE) 1 MG tablet  Take 1 tablet by mouth daily             losartan (COZAAR) 25 MG tablet  Take 25 mg by mouth daily             metoprolol tartrate (LOPRESSOR) 50 MG tablet  Take 1 tablet by mouth 2 times daily             nicotine (NICODERM CQ) 14 MG/24HR  Place 1 patch onto the skin daily             NIFEdipine (PROCARDIA XL) 60 MG extended release tablet  Take 60 mg by mouth daily             Nutritional Supplements (JUICE PLUS FIBRE PO)  Take 2 each by mouth 2 times daily             pantoprazole (PROTONIX) 40 MG tablet  Take 1 tablet by mouth every morning (before breakfast)             tiotropium (SPIRIVA RESPIMAT) 2.5 MCG/ACT AERS inhaler  Inhale 1 puff into the lungs daily             travoprost, benzalkonium, (TRAVATAN) 0.004 % ophthalmic solution  Place 1 drop into the left eye nightly              vitamin B-1 100 MG tablet  Take 1 tablet by mouth daily                 Objective Findings at Discharge:   /60   Pulse 73   Temp 97.9 °F (36.6 °C) (Oral)   Resp 17   Ht 5' 8\" (1.727 m)   Wt 152 lb (68.9 kg)   SpO2 90%   BMI 23.11 kg/m²            PHYSICAL EXAM   GEN Awake male, sitting upright in bed in no apparent distress. Appears given age.   EYES Pupils are equally round. No scleral erythema, discharge, or conjunctivitis. HENT Mucous membranes are moist. Oral pharynx without exudates, no evidence of thrush. NECK Supple, no apparent thyromegaly or masses. RESP Clear to auscultation, no wheezes, rales or rhonchi. Symmetric chest movement while on room air. CARDIO/VASC S1/S2 auscultated. Regular rate without appreciable murmurs, rubs, or gallops. No JVD or carotid bruits. Peripheral pulses equal bilaterally and palpable. No peripheral edema. GI Abdomen is soft without significant tenderness, masses, or guarding. Bowel sounds are normoactive. Rectal exam deferred.  No costovertebral angle tenderness. Normal appearing external genitalia. Giron catheter is not present. HEME/LYMPH No palpable cervical lymphadenopathy and no hepatosplenomegaly. No petechiae or ecchymoses. MSK No gross joint deformities. SKIN Normal coloration, warm, dry. NEURO Cranial nerves appear grossly intact, normal speech, no lateralizing weakness. PSYCH Awake, alert, oriented x 4. Affect appropriate. BMP/CBC  Recent Labs     01/18/20  0240  01/19/20  0610 01/20/20  0419 01/20/20  1607   *  --  136 137  --    K 4.3  --  4.0 3.7  --      --  99 101  --    CO2 24  --  28 28  --    BUN 9  --  3* 3*  --    CREATININE 0.7*  --  0.6* 0.7*  --    HCT 26.6*   < > 28.5* 28.3* 28.3*    < > = values in this interval not displayed.        IMAGING:  As above    Discharge Time of 35 minutes    Electronically signed by Marti Balderas MD on 1/20/2020 at 4:40 PM

## 2020-01-21 LAB
ABO/RH: NORMAL
ANTIBODY SCREEN: NEGATIVE
COMPONENT: NORMAL
CROSSMATCH RESULT: NORMAL
EKG ATRIAL RATE: 95 BPM
EKG DIAGNOSIS: NORMAL
EKG P AXIS: 75 DEGREES
EKG P-R INTERVAL: 172 MS
EKG Q-T INTERVAL: 352 MS
EKG QRS DURATION: 96 MS
EKG QTC CALCULATION (BAZETT): 442 MS
EKG R AXIS: 38 DEGREES
EKG T AXIS: 82 DEGREES
EKG VENTRICULAR RATE: 95 BPM
STATUS: NORMAL
TRANSFUSION STATUS: NORMAL
UNIT DIVISION: 0
UNIT NUMBER: NORMAL

## 2021-03-23 ENCOUNTER — APPOINTMENT (OUTPATIENT)
Dept: CT IMAGING | Age: 71
End: 2021-03-23
Payer: MEDICARE

## 2021-03-23 ENCOUNTER — APPOINTMENT (OUTPATIENT)
Dept: GENERAL RADIOLOGY | Age: 71
End: 2021-03-23
Payer: MEDICARE

## 2021-03-23 ENCOUNTER — HOSPITAL ENCOUNTER (EMERGENCY)
Age: 71
Discharge: HOME | End: 2021-03-23
Attending: EMERGENCY MEDICINE
Payer: MEDICARE

## 2021-03-23 VITALS
TEMPERATURE: 98.5 F | RESPIRATION RATE: 18 BRPM | HEART RATE: 64 BPM | WEIGHT: 155 LBS | DIASTOLIC BLOOD PRESSURE: 58 MMHG | HEIGHT: 70 IN | BODY MASS INDEX: 22.19 KG/M2 | OXYGEN SATURATION: 95 % | SYSTOLIC BLOOD PRESSURE: 150 MMHG

## 2021-03-23 DIAGNOSIS — E87.1 HYPONATREMIA: ICD-10-CM

## 2021-03-23 DIAGNOSIS — S01.81XA FOREHEAD LACERATION, INITIAL ENCOUNTER: ICD-10-CM

## 2021-03-23 DIAGNOSIS — R55 SYNCOPE, UNSPECIFIED SYNCOPE TYPE: Primary | ICD-10-CM

## 2021-03-23 DIAGNOSIS — S02.92XA CLOSED EXTENSIVE FACIAL FRACTURES, INITIAL ENCOUNTER (HCC): ICD-10-CM

## 2021-03-23 DIAGNOSIS — S09.90XA CLOSED HEAD INJURY, INITIAL ENCOUNTER: ICD-10-CM

## 2021-03-23 LAB
ALBUMIN SERPL-MCNC: 4.4 GM/DL (ref 3.4–5)
ALP BLD-CCNC: 61 IU/L (ref 40–128)
ALT SERPL-CCNC: 18 U/L (ref 10–40)
ANION GAP SERPL CALCULATED.3IONS-SCNC: 10 MMOL/L (ref 4–16)
AST SERPL-CCNC: 30 IU/L (ref 15–37)
BASOPHILS ABSOLUTE: 0.1 K/CU MM
BASOPHILS RELATIVE PERCENT: 0.4 % (ref 0–1)
BILIRUB SERPL-MCNC: 0.3 MG/DL (ref 0–1)
BUN BLDV-MCNC: 10 MG/DL (ref 6–23)
CALCIUM SERPL-MCNC: 8.7 MG/DL (ref 8.3–10.6)
CHLORIDE BLD-SCNC: 92 MMOL/L (ref 99–110)
CO2: 25 MMOL/L (ref 21–32)
CREAT SERPL-MCNC: 0.6 MG/DL (ref 0.9–1.3)
DIFFERENTIAL TYPE: ABNORMAL
EOSINOPHILS ABSOLUTE: 0.1 K/CU MM
EOSINOPHILS RELATIVE PERCENT: 0.8 % (ref 0–3)
GFR AFRICAN AMERICAN: >60 ML/MIN/1.73M2
GFR NON-AFRICAN AMERICAN: >60 ML/MIN/1.73M2
GLUCOSE BLD-MCNC: 123 MG/DL (ref 70–99)
HCT VFR BLD CALC: 43.2 % (ref 42–52)
HEMOGLOBIN: 14.8 GM/DL (ref 13.5–18)
IMMATURE NEUTROPHIL %: 0.8 % (ref 0–0.43)
LYMPHOCYTES ABSOLUTE: 1.1 K/CU MM
LYMPHOCYTES RELATIVE PERCENT: 6.8 % (ref 24–44)
MAGNESIUM: 1.8 MG/DL (ref 1.8–2.4)
MCH RBC QN AUTO: 32.5 PG (ref 27–31)
MCHC RBC AUTO-ENTMCNC: 34.3 % (ref 32–36)
MCV RBC AUTO: 94.7 FL (ref 78–100)
MONOCYTES ABSOLUTE: 1.7 K/CU MM
MONOCYTES RELATIVE PERCENT: 9.9 % (ref 0–4)
NUCLEATED RBC %: 0 %
PDW BLD-RTO: 12.1 % (ref 11.7–14.9)
PLATELET # BLD: 527 K/CU MM (ref 140–440)
PMV BLD AUTO: 9.1 FL (ref 7.5–11.1)
POTASSIUM SERPL-SCNC: 3.5 MMOL/L (ref 3.5–5.1)
RBC # BLD: 4.56 M/CU MM (ref 4.6–6.2)
SEGMENTED NEUTROPHILS ABSOLUTE COUNT: 13.6 K/CU MM
SEGMENTED NEUTROPHILS RELATIVE PERCENT: 81.3 % (ref 36–66)
SODIUM BLD-SCNC: 127 MMOL/L (ref 135–145)
TOTAL IMMATURE NEUTOROPHIL: 0.13 K/CU MM
TOTAL NUCLEATED RBC: 0 K/CU MM
TOTAL PROTEIN: 6.6 GM/DL (ref 6.4–8.2)
TROPONIN T: <0.01 NG/ML
WBC # BLD: 16.7 K/CU MM (ref 4–10.5)

## 2021-03-23 PROCEDURE — 70450 CT HEAD/BRAIN W/O DYE: CPT

## 2021-03-23 PROCEDURE — 72170 X-RAY EXAM OF PELVIS: CPT

## 2021-03-23 PROCEDURE — 85025 COMPLETE CBC W/AUTO DIFF WBC: CPT

## 2021-03-23 PROCEDURE — 36415 COLL VENOUS BLD VENIPUNCTURE: CPT

## 2021-03-23 PROCEDURE — 71045 X-RAY EXAM CHEST 1 VIEW: CPT

## 2021-03-23 PROCEDURE — 72125 CT NECK SPINE W/O DYE: CPT

## 2021-03-23 PROCEDURE — 93010 ELECTROCARDIOGRAM REPORT: CPT | Performed by: INTERNAL MEDICINE

## 2021-03-23 PROCEDURE — 2500000003 HC RX 250 WO HCPCS: Performed by: EMERGENCY MEDICINE

## 2021-03-23 PROCEDURE — 70486 CT MAXILLOFACIAL W/O DYE: CPT

## 2021-03-23 PROCEDURE — 99283 EMERGENCY DEPT VISIT LOW MDM: CPT

## 2021-03-23 PROCEDURE — 12013 RPR F/E/E/N/L/M 2.6-5.0 CM: CPT

## 2021-03-23 PROCEDURE — 84484 ASSAY OF TROPONIN QUANT: CPT

## 2021-03-23 PROCEDURE — 93005 ELECTROCARDIOGRAM TRACING: CPT | Performed by: EMERGENCY MEDICINE

## 2021-03-23 PROCEDURE — 80053 COMPREHEN METABOLIC PANEL: CPT

## 2021-03-23 PROCEDURE — 83735 ASSAY OF MAGNESIUM: CPT

## 2021-03-23 RX ADMIN — LIDOCAINE HYDROCHLORIDE 5 ML: 10 INJECTION, SOLUTION EPIDURAL; INFILTRATION; INTRACAUDAL; PERINEURAL at 06:52

## 2021-03-23 ASSESSMENT — PAIN SCALES - GENERAL: PAINLEVEL_OUTOF10: 6

## 2021-03-23 NOTE — ED TRIAGE NOTES
Pt presents to the ED via EMS with complaints of a fall. Pt reports he was taking the trash out around 0100, LOC, and fell. Pt states he could not get up, passerby found pt down. Pt states he loss consciousness for unknown amount of time. EMS reports Lac to back of head and nose. Pt states he is on plavix. Pt alert and oriented at this time.

## 2021-03-23 NOTE — ED NOTES
Reviewed discharge instructions with patient and family. All voice understanding/deny questions. Patient also signs AMA form. Patient removed from floor in wheelchair. Condition stable.        Timmy Denise, RN  03/23/21 7446

## 2021-03-23 NOTE — ED NOTES
Bed: ED-30  Expected date:   Expected time:   Means of arrival:   Comments:  Trauma Alert      Jillian Ann  03/23/21 9709

## 2021-03-23 NOTE — ED PROVIDER NOTES
- 5.0 standard drinks     Types: 4 - 5 Shots of liquor per week     Comment: mixed drinks per day, whiskey    Drug use: Never    Sexual activity: Not on file   Lifestyle    Physical activity     Days per week: Not on file     Minutes per session: Not on file    Stress: Not on file   Relationships    Social connections     Talks on phone: Not on file     Gets together: Not on file     Attends Mu-ism service: Not on file     Active member of club or organization: Not on file     Attends meetings of clubs or organizations: Not on file     Relationship status: Not on file    Intimate partner violence     Fear of current or ex partner: Not on file     Emotionally abused: Not on file     Physically abused: Not on file     Forced sexual activity: Not on file   Other Topics Concern    Not on file   Social History Narrative    Not on file     No current facility-administered medications for this encounter.       Current Outpatient Medications   Medication Sig Dispense Refill    aspirin EC 81 MG EC tablet Take 1 tablet by mouth daily Resume after 2 weeks (02/03/2020) 30 tablet 1    folic acid (FOLVITE) 1 MG tablet Take 1 tablet by mouth daily 30 tablet 3    vitamin B-1 100 MG tablet Take 1 tablet by mouth daily 30 tablet 3    pantoprazole (PROTONIX) 40 MG tablet Take 1 tablet by mouth every morning (before breakfast) 30 tablet 1    ferrous sulfate (FE TABS) 325 (65 Fe) MG EC tablet Take 1 tablet by mouth 2 times daily 90 tablet 3    brimonidine-timolol (COMBIGAN) 0.2-0.5 % ophthalmic solution Place 1 drop into both eyes every 12 hours      tiotropium (SPIRIVA RESPIMAT) 2.5 MCG/ACT AERS inhaler Inhale 1 puff into the lungs daily      Nutritional Supplements (JUICE PLUS FIBRE PO) Take 2 each by mouth 2 times daily      metoprolol tartrate (LOPRESSOR) 50 MG tablet Take 1 tablet by mouth 2 times daily 60 tablet 1    nicotine (NICODERM CQ) 14 MG/24HR Place 1 patch onto the skin daily 30 patch 1    travoprost, benzalkonium, (TRAVATAN) 0.004 % ophthalmic solution Place 1 drop into the left eye nightly       brinzolamide (AZOPT) 1 % ophthalmic suspension Place 1 drop into the left eye 3 times daily       ALPRAZolam (XANAX) 0.25 MG tablet Take 0.25 mg by mouth every 6 hours as needed for Sleep or Anxiety      atorvastatin (LIPITOR) 40 MG tablet Take 40 mg by mouth daily      fenofibrate (TRICOR) 145 MG tablet Take 145 mg by mouth daily      diphenoxylate-atropine (LOMOTIL) 2.5-0.025 MG per tablet Take 2 tablets by mouth daily as needed for Diarrhea      losartan (COZAAR) 25 MG tablet Take 25 mg by mouth daily      NIFEdipine (PROCARDIA XL) 60 MG extended release tablet Take 60 mg by mouth daily       No Known Allergies    Nursing Notes Reviewed    Physical Exam:  ED Triage Vitals [03/23/21 8535]   Enc Vitals Group      BP (!) 150/58      Pulse 54      Resp 22      Temp       Temp src       SpO2 93 %      Weight 155 lb (70.3 kg)      Height 5' 10\" (1.778 m)      Head Circumference       Peak Flow       Pain Score       Pain Loc       Pain Edu? Excl. in [address]? General appearance:  No acute distress. C-collar in place  Head: Normocephalic, abrasion to posterior scalp. Face: No bony deformity, midface stable. Multiple facial abrasions. Skin:  Warm. Dry. No acute wounds  Eye:  Extraocular movements intact. Pupils equal and reactive  Ears, nose, mouth and throat:  Oral mucosa moist, no hemotympanum. No nasal septal hematoma. Nasal bridge swelling. Neck:  Trachea midline. Extremity:  No swelling. Normal ROM. No tenderness to palpation x4 extremities   Back: No midline CTLS tenderness to palpation or step-offs  Heart:  Regular rate and rhythm  Respiratory:  Lungs clear to auscultation bilaterally. Respirations nonlabored. Chest: No tenderness to palpation. No ecchymosis or deformity. Abdominal:  Soft. Nontender. Non distended.      Neurological:  Alert and oriented times 4.  5 out of 5 motor strength Duration 98 ms    Q-T Interval 494 ms    QTc Calculation (Bazett) 459 ms    P Axis 109 degrees    R Axis -22 degrees    T Axis 40 degrees    Diagnosis       Sinus bradycardia  Early repolarization  Otherwise normal ECG  When compared with ECG of 17-JAN-2020 10:48,  Vent. rate has decreased BY  43 BPM  Questionable change in QRS axis  ST elevation now present in Lateral leads  Nonspecific T wave abnormality now evident in Inferior leads        Radiographs (if obtained):  [] The following radiograph was interpreted by myself in the absence of a radiologist:  [x] Radiologist's Report Reviewed:    EKG (if obtained): (All EKG's are interpreted by myself in the absence of a cardiologist)  12 lead EKG as interpreted by me reveals normal sinus rhythm. Axis is normal. There are no ischemic ST elevations or other suspicious ST changes;  QRS interval is narrow, QT interval is not prolonged. Final interpretation: Normal sinus rhythm. MDM:  Plan of care is discussed thoroughly with the patient and family if present. If performed, all imaging and lab work also discussed with patient. All relevant prior results and chart reviewed if available. Patient presents as above. He is in no acute distress. Presents with now resolving epistaxis and evidence of facial trauma after a syncopal episode. Patient denying any pain at this time. He has no spine tenderness or focal neurologic deficits. Patient sent directly to CT scan to rule out any evidence of acute intracranial abnormality. Patient's metabolic work-up is significant for hyponatremia. Troponin within normal limits. EKG shows no evidence of short MD, AV block, bifascicular block, Brugada syndrome, left ventricular hypertrophy, arrhythmogenic right ventricular cardiomyopathy. Imaging consistent with extensive facial fractures including nasal bones, medial orbital walls, pterygoid plates. Patient without any signs of orbital entrapment.     Plan to transfer

## 2021-03-23 NOTE — ED PROVIDER NOTES
Post-Op Assessment Note      CV Status:  Stable    Mental Status:  Somnolent    Hydration Status:  Stable    PONV Controlled:  None    Airway Patency:  Patent        Staff: CRNA           BP      Temp     Pulse     Resp      SpO2 Procedure Note - Laceration repair: 3cm laceration medial to left eyebrow  Questions were sought and answered and verbal consent was given by patient for the procedure. The area was prepped and draped in standard bedside fashion. The wound area was anesthetized with 2ml of Lidocaine 1% without epinephrine. The wound was explored with No foreign bodies found and irrigated with normal saline. The wound was repaired with 5-0 Prolene; 3  sutures were used. The patient tolerated the procedure well without complications and my repeat neurovascular exam post-procedure is unchanged. Wound care and scar minimization education was provided. Instructions were given to return for increasing pain, redness, streaking, discharge, or any other worsening or worrisome concerns.         Sidney Medrano MD  03/23/21 [id number]

## 2021-03-23 NOTE — ED NOTES
Patient able to stand at bedside without assistance to use urinal.  Returns to bed.        Bedford Fothergill, RN  03/23/21 0949

## 2021-03-23 NOTE — ED NOTES
Patient's daughter arrives at bedside. Updated on condition.        Arpita Izaguirre RN  03/23/21 8897

## 2021-03-24 LAB
EKG ATRIAL RATE: 52 BPM
EKG DIAGNOSIS: NORMAL
EKG P AXIS: 109 DEGREES
EKG P-R INTERVAL: 158 MS
EKG Q-T INTERVAL: 494 MS
EKG QRS DURATION: 98 MS
EKG QTC CALCULATION (BAZETT): 459 MS
EKG R AXIS: -22 DEGREES
EKG T AXIS: 40 DEGREES
EKG VENTRICULAR RATE: 52 BPM

## 2022-01-01 ENCOUNTER — APPOINTMENT (OUTPATIENT)
Dept: CT IMAGING | Age: 72
DRG: 521 | End: 2022-01-01
Payer: MEDICARE

## 2022-01-01 ENCOUNTER — APPOINTMENT (OUTPATIENT)
Dept: GENERAL RADIOLOGY | Age: 72
DRG: 521 | End: 2022-01-01
Payer: MEDICARE

## 2022-01-01 ENCOUNTER — ANESTHESIA (OUTPATIENT)
Dept: OPERATING ROOM | Age: 72
DRG: 521 | End: 2022-01-01
Payer: MEDICARE

## 2022-01-01 ENCOUNTER — ANESTHESIA EVENT (OUTPATIENT)
Dept: OPERATING ROOM | Age: 72
DRG: 521 | End: 2022-01-01
Payer: MEDICARE

## 2022-01-01 ENCOUNTER — HOSPITAL ENCOUNTER (INPATIENT)
Age: 72
LOS: 6 days | DRG: 521 | End: 2022-10-05
Attending: EMERGENCY MEDICINE | Admitting: STUDENT IN AN ORGANIZED HEALTH CARE EDUCATION/TRAINING PROGRAM
Payer: MEDICARE

## 2022-01-01 ENCOUNTER — APPOINTMENT (OUTPATIENT)
Dept: MRI IMAGING | Age: 72
DRG: 521 | End: 2022-01-01
Payer: MEDICARE

## 2022-01-01 ENCOUNTER — TELEPHONE (OUTPATIENT)
Dept: NEUROSURGERY | Age: 72
End: 2022-01-01

## 2022-01-01 ENCOUNTER — APPOINTMENT (OUTPATIENT)
Dept: ULTRASOUND IMAGING | Age: 72
DRG: 521 | End: 2022-01-01
Payer: MEDICARE

## 2022-01-01 VITALS
WEIGHT: 142.7 LBS | HEART RATE: 108 BPM | RESPIRATION RATE: 14 BRPM | DIASTOLIC BLOOD PRESSURE: 50 MMHG | OXYGEN SATURATION: 30 % | HEIGHT: 67 IN | TEMPERATURE: 98.3 F | SYSTOLIC BLOOD PRESSURE: 80 MMHG | BODY MASS INDEX: 22.4 KG/M2

## 2022-01-01 DIAGNOSIS — R65.20 SEVERE SEPSIS (HCC): ICD-10-CM

## 2022-01-01 DIAGNOSIS — G91.2 NORMAL PRESSURE HYDROCEPHALUS (HCC): ICD-10-CM

## 2022-01-01 DIAGNOSIS — A41.9 SEVERE SEPSIS (HCC): ICD-10-CM

## 2022-01-01 DIAGNOSIS — S22.080A T12 COMPRESSION FRACTURE, INITIAL ENCOUNTER (HCC): ICD-10-CM

## 2022-01-01 DIAGNOSIS — S72.002A CLOSED FRACTURE OF NECK OF LEFT FEMUR, INITIAL ENCOUNTER (HCC): ICD-10-CM

## 2022-01-01 DIAGNOSIS — S22.080A COMPRESSION FRACTURE OF T12 VERTEBRA, INITIAL ENCOUNTER (HCC): Primary | ICD-10-CM
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
## 2022-01-01 DIAGNOSIS — Z78.9 ALCOHOL USE: ICD-10-CM

## 2022-01-01 LAB
ABO/RH: NORMAL
ACETAMINOPHEN LEVEL: <5 UG/ML (ref 15–30)
ADENOVIRUS DETECTION BY PCR: NOT DETECTED
ALBUMIN SERPL-MCNC: 4.1 GM/DL (ref 3.4–5)
ALCOHOL SCREEN SERUM: <0.01 %WT/VOL
ALP BLD-CCNC: 60 IU/L (ref 40–129)
ALT SERPL-CCNC: 15 U/L (ref 10–40)
AMMONIA: 37 UMOL/L (ref 16–60)
ANION GAP SERPL CALCULATED.3IONS-SCNC: 11 MMOL/L (ref 4–16)
ANION GAP SERPL CALCULATED.3IONS-SCNC: 14 MMOL/L (ref 4–16)
ANION GAP SERPL CALCULATED.3IONS-SCNC: 16 MMOL/L (ref 4–16)
ANION GAP SERPL CALCULATED.3IONS-SCNC: 5 MMOL/L (ref 4–16)
ANION GAP SERPL CALCULATED.3IONS-SCNC: 9 MMOL/L (ref 4–16)
ANION GAP SERPL CALCULATED.3IONS-SCNC: 9 MMOL/L (ref 4–16)
ANTIBODY SCREEN: NEGATIVE
AST SERPL-CCNC: 22 IU/L (ref 15–37)
BACTERIA: NEGATIVE /HPF
BANDED NEUTROPHILS ABSOLUTE COUNT: 0.33 K/CU MM
BANDED NEUTROPHILS RELATIVE PERCENT: 2 % (ref 5–11)
BASOPHILS ABSOLUTE: 0 K/CU MM
BASOPHILS RELATIVE PERCENT: 0.1 % (ref 0–1)
BASOPHILS RELATIVE PERCENT: 0.2 % (ref 0–1)
BASOPHILS RELATIVE PERCENT: 0.2 % (ref 0–1)
BILIRUB SERPL-MCNC: 0.5 MG/DL (ref 0–1)
BILIRUBIN URINE: NEGATIVE MG/DL
BLOOD, URINE: NEGATIVE
BORDETELLA PARAPERTUSSIS BY PCR: NOT DETECTED
BORDETELLA PERTUSSIS PCR: NOT DETECTED
BUN BLDV-MCNC: 14 MG/DL (ref 6–23)
BUN BLDV-MCNC: 15 MG/DL (ref 6–23)
BUN BLDV-MCNC: 18 MG/DL (ref 6–23)
BUN BLDV-MCNC: 44 MG/DL (ref 6–23)
BUN BLDV-MCNC: 46 MG/DL (ref 6–23)
BUN BLDV-MCNC: 9 MG/DL (ref 6–23)
CALCIUM SERPL-MCNC: 8.2 MG/DL (ref 8.3–10.6)
CALCIUM SERPL-MCNC: 8.3 MG/DL (ref 8.3–10.6)
CALCIUM SERPL-MCNC: 8.5 MG/DL (ref 8.3–10.6)
CALCIUM SERPL-MCNC: 8.5 MG/DL (ref 8.3–10.6)
CALCIUM SERPL-MCNC: 8.6 MG/DL (ref 8.3–10.6)
CALCIUM SERPL-MCNC: 8.9 MG/DL (ref 8.3–10.6)
CHLAMYDOPHILA PNEUMONIA PCR: NOT DETECTED
CHLORIDE BLD-SCNC: 91 MMOL/L (ref 99–110)
CHLORIDE BLD-SCNC: 93 MMOL/L (ref 99–110)
CHLORIDE BLD-SCNC: 95 MMOL/L (ref 99–110)
CHLORIDE BLD-SCNC: 96 MMOL/L (ref 99–110)
CHLORIDE BLD-SCNC: 97 MMOL/L (ref 99–110)
CHLORIDE BLD-SCNC: 98 MMOL/L (ref 99–110)
CLARITY: CLEAR
CO2: 20 MMOL/L (ref 21–32)
CO2: 21 MMOL/L (ref 21–32)
CO2: 23 MMOL/L (ref 21–32)
CO2: 24 MMOL/L (ref 21–32)
CO2: 27 MMOL/L (ref 21–32)
CO2: 28 MMOL/L (ref 21–32)
COLOR: YELLOW
COMPONENT: NORMAL
COMPONENT: NORMAL
CORONAVIRUS 229E PCR: NOT DETECTED
CORONAVIRUS HKU1 PCR: NOT DETECTED
CORONAVIRUS NL63 PCR: NOT DETECTED
CORONAVIRUS OC43 PCR: NOT DETECTED
CREAT SERPL-MCNC: 0.4 MG/DL (ref 0.9–1.3)
CREAT SERPL-MCNC: 0.4 MG/DL (ref 0.9–1.3)
CREAT SERPL-MCNC: 0.5 MG/DL (ref 0.9–1.3)
CREAT SERPL-MCNC: 0.6 MG/DL (ref 0.9–1.3)
CREAT SERPL-MCNC: 1.2 MG/DL (ref 0.9–1.3)
CREAT SERPL-MCNC: 1.3 MG/DL (ref 0.9–1.3)
CROSSMATCH RESULT: NORMAL
CROSSMATCH RESULT: NORMAL
CULTURE: ABNORMAL
CULTURE: NORMAL
DIFFERENTIAL TYPE: ABNORMAL
DOSE AMOUNT: ABNORMAL
DOSE AMOUNT: NORMAL
DOSE TIME: ABNORMAL
DOSE TIME: NORMAL
EKG ATRIAL RATE: 103 BPM
EKG DIAGNOSIS: NORMAL
EKG P AXIS: 50 DEGREES
EKG P-R INTERVAL: 176 MS
EKG Q-T INTERVAL: 328 MS
EKG QRS DURATION: 88 MS
EKG QTC CALCULATION (BAZETT): 429 MS
EKG R AXIS: -45 DEGREES
EKG T AXIS: 57 DEGREES
EKG VENTRICULAR RATE: 103 BPM
EOSINOPHILS ABSOLUTE: 0 K/CU MM
EOSINOPHILS ABSOLUTE: 0.2 K/CU MM
EOSINOPHILS RELATIVE PERCENT: 0 % (ref 0–3)
EOSINOPHILS RELATIVE PERCENT: 0.3 % (ref 0–3)
EOSINOPHILS RELATIVE PERCENT: 1.7 % (ref 0–3)
FERRITIN: 2383 NG/ML (ref 30–400)
FOLATE: 14.3 NG/ML (ref 3.1–17.5)
GFR AFRICAN AMERICAN: >60 ML/MIN/1.73M2
GFR NON-AFRICAN AMERICAN: 54 ML/MIN/1.73M2
GFR NON-AFRICAN AMERICAN: 59 ML/MIN/1.73M2
GFR NON-AFRICAN AMERICAN: >60 ML/MIN/1.73M2
GLUCOSE BLD-MCNC: 111 MG/DL (ref 70–99)
GLUCOSE BLD-MCNC: 134 MG/DL (ref 70–99)
GLUCOSE BLD-MCNC: 136 MG/DL (ref 70–99)
GLUCOSE BLD-MCNC: 142 MG/DL (ref 70–99)
GLUCOSE BLD-MCNC: 145 MG/DL (ref 70–99)
GLUCOSE BLD-MCNC: 171 MG/DL (ref 70–99)
GLUCOSE BLD-MCNC: 185 MG/DL (ref 70–99)
GLUCOSE, URINE: NEGATIVE MG/DL
HCT VFR BLD CALC: 17.5 % (ref 42–52)
HCT VFR BLD CALC: 18.6 % (ref 42–52)
HCT VFR BLD CALC: 26.4 % (ref 42–52)
HCT VFR BLD CALC: 28.2 % (ref 42–52)
HCT VFR BLD CALC: 29.2 % (ref 42–52)
HCT VFR BLD CALC: 30.5 % (ref 42–52)
HCT VFR BLD CALC: 37 % (ref 42–52)
HEMOGLOBIN: 10.4 GM/DL (ref 13.5–18)
HEMOGLOBIN: 12.6 GM/DL (ref 13.5–18)
HEMOGLOBIN: 5.6 GM/DL (ref 13.5–18)
HEMOGLOBIN: 6 GM/DL (ref 13.5–18)
HEMOGLOBIN: 8.8 GM/DL (ref 13.5–18)
HEMOGLOBIN: 9 GM/DL (ref 13.5–18)
HEMOGLOBIN: 9.5 GM/DL (ref 13.5–18)
HUMAN METAPNEUMOVIRUS PCR: NOT DETECTED
HYALINE CASTS: 2 /LPF
IMMATURE NEUTROPHIL %: 0.2 % (ref 0–0.43)
IMMATURE NEUTROPHIL %: 0.3 % (ref 0–0.43)
IMMATURE NEUTROPHIL %: 0.6 % (ref 0–0.43)
IMMATURE NEUTROPHIL %: 0.9 % (ref 0–0.43)
IMMATURE NEUTROPHIL %: 1 % (ref 0–0.43)
INFLUENZA A BY PCR: NOT DETECTED
INFLUENZA A H1 (2009) PCR: NOT DETECTED
INFLUENZA A H1 PANDEMIC PCR: NOT DETECTED
INFLUENZA A H3 PCR: NOT DETECTED
INFLUENZA B BY PCR: NOT DETECTED
INR BLD: 1.02 INDEX
INR BLD: 1.02 INDEX
IRON: 25 UG/DL (ref 59–158)
KETONES, URINE: ABNORMAL MG/DL
LACTATE DEHYDROGENASE: 205 IU/L (ref 120–246)
LACTIC ACID, SEPSIS: 0.9 MMOL/L (ref 0.5–1.9)
LACTIC ACID, SEPSIS: 2.7 MMOL/L (ref 0.5–1.9)
LEUKOCYTE ESTERASE, URINE: NEGATIVE
LIPASE: 15 IU/L (ref 13–60)
LV EF: 53 %
LVEF MODALITY: NORMAL
LYMPHOCYTES ABSOLUTE: 0.2 K/CU MM
LYMPHOCYTES ABSOLUTE: 0.3 K/CU MM
LYMPHOCYTES ABSOLUTE: 0.6 K/CU MM
LYMPHOCYTES ABSOLUTE: 0.7 K/CU MM
LYMPHOCYTES ABSOLUTE: 0.7 K/CU MM
LYMPHOCYTES ABSOLUTE: 0.9 K/CU MM
LYMPHOCYTES RELATIVE PERCENT: 1.2 % (ref 24–44)
LYMPHOCYTES RELATIVE PERCENT: 2 % (ref 24–44)
LYMPHOCYTES RELATIVE PERCENT: 4.5 % (ref 24–44)
LYMPHOCYTES RELATIVE PERCENT: 5.3 % (ref 24–44)
LYMPHOCYTES RELATIVE PERCENT: 7.1 % (ref 24–44)
LYMPHOCYTES RELATIVE PERCENT: 7.5 % (ref 24–44)
Lab: ABNORMAL
Lab: NORMAL
MAGNESIUM: 1.5 MG/DL (ref 1.8–2.4)
MAGNESIUM: 2 MG/DL (ref 1.8–2.4)
MAGNESIUM: 2 MG/DL (ref 1.8–2.4)
MCH RBC QN AUTO: 29.9 PG (ref 27–31)
MCH RBC QN AUTO: 30.3 PG (ref 27–31)
MCH RBC QN AUTO: 30.5 PG (ref 27–31)
MCH RBC QN AUTO: 30.8 PG (ref 27–31)
MCH RBC QN AUTO: 31 PG (ref 27–31)
MCH RBC QN AUTO: 31 PG (ref 27–31)
MCHC RBC AUTO-ENTMCNC: 32 % (ref 32–36)
MCHC RBC AUTO-ENTMCNC: 32.3 % (ref 32–36)
MCHC RBC AUTO-ENTMCNC: 33.3 % (ref 32–36)
MCHC RBC AUTO-ENTMCNC: 33.7 % (ref 32–36)
MCHC RBC AUTO-ENTMCNC: 34.1 % (ref 32–36)
MCHC RBC AUTO-ENTMCNC: 34.1 % (ref 32–36)
MCV RBC AUTO: 89.8 FL (ref 78–100)
MCV RBC AUTO: 91 FL (ref 78–100)
MCV RBC AUTO: 91.1 FL (ref 78–100)
MCV RBC AUTO: 92.3 FL (ref 78–100)
MCV RBC AUTO: 93.6 FL (ref 78–100)
MCV RBC AUTO: 94.4 FL (ref 78–100)
MONOCYTES ABSOLUTE: 0.9 K/CU MM
MONOCYTES ABSOLUTE: 1 K/CU MM
MONOCYTES ABSOLUTE: 1.4 K/CU MM
MONOCYTES ABSOLUTE: 1.5 K/CU MM
MONOCYTES ABSOLUTE: 1.9 K/CU MM
MONOCYTES ABSOLUTE: 2.2 K/CU MM
MONOCYTES RELATIVE PERCENT: 11.4 % (ref 0–4)
MONOCYTES RELATIVE PERCENT: 13.8 % (ref 0–4)
MONOCYTES RELATIVE PERCENT: 15.8 % (ref 0–4)
MONOCYTES RELATIVE PERCENT: 16.9 % (ref 0–4)
MONOCYTES RELATIVE PERCENT: 5.7 % (ref 0–4)
MONOCYTES RELATIVE PERCENT: 6 % (ref 0–4)
MUCUS: ABNORMAL HPF
MYCOPLASMA PNEUMONIAE PCR: NOT DETECTED
NITRITE URINE, QUANTITATIVE: NEGATIVE
NUCLEATED RBC %: 0 %
PARAINFLUENZA 1 PCR: NOT DETECTED
PARAINFLUENZA 2 PCR: NOT DETECTED
PARAINFLUENZA 3 PCR: NOT DETECTED
PARAINFLUENZA 4 PCR: NOT DETECTED
PCT TRANSFERRIN: 18 % (ref 10–44)
PDW BLD-RTO: 12.3 % (ref 11.7–14.9)
PDW BLD-RTO: 12.3 % (ref 11.7–14.9)
PDW BLD-RTO: 12.6 % (ref 11.7–14.9)
PDW BLD-RTO: 12.6 % (ref 11.7–14.9)
PDW BLD-RTO: 12.7 % (ref 11.7–14.9)
PDW BLD-RTO: 12.7 % (ref 11.7–14.9)
PH, URINE: 6 (ref 5–8)
PHOSPHORUS: 3.5 MG/DL (ref 2.5–4.9)
PHOSPHORUS: 6.5 MG/DL (ref 2.5–4.9)
PHOSPHORUS: 7 MG/DL (ref 2.5–4.9)
PLATELET # BLD: 393 K/CU MM (ref 140–440)
PLATELET # BLD: 453 K/CU MM (ref 140–440)
PLATELET # BLD: 502 K/CU MM (ref 140–440)
PLATELET # BLD: 523 K/CU MM (ref 140–440)
PLATELET # BLD: 524 K/CU MM (ref 140–440)
PLATELET # BLD: 542 K/CU MM (ref 140–440)
PMV BLD AUTO: 10 FL (ref 7.5–11.1)
PMV BLD AUTO: 9.5 FL (ref 7.5–11.1)
PMV BLD AUTO: 9.5 FL (ref 7.5–11.1)
PMV BLD AUTO: 9.7 FL (ref 7.5–11.1)
PMV BLD AUTO: 9.7 FL (ref 7.5–11.1)
PMV BLD AUTO: 9.9 FL (ref 7.5–11.1)
POTASSIUM SERPL-SCNC: 3.5 MMOL/L (ref 3.5–5.1)
POTASSIUM SERPL-SCNC: 4 MMOL/L (ref 3.5–5.1)
POTASSIUM SERPL-SCNC: 4.1 MMOL/L (ref 3.5–5.1)
POTASSIUM SERPL-SCNC: 4.3 MMOL/L (ref 3.5–5.1)
POTASSIUM SERPL-SCNC: 4.7 MMOL/L (ref 3.5–5.1)
POTASSIUM SERPL-SCNC: 4.9 MMOL/L (ref 3.5–5.1)
PROTEIN UA: 30 MG/DL
PROTHROMBIN TIME: 13.1 SECONDS (ref 11.7–14.5)
PROTHROMBIN TIME: 13.2 SECONDS (ref 11.7–14.5)
RBC # BLD: 1.87 M/CU MM (ref 4.6–6.2)
RBC # BLD: 1.97 M/CU MM (ref 4.6–6.2)
RBC # BLD: 2.86 M/CU MM (ref 4.6–6.2)
RBC # BLD: 3.14 M/CU MM (ref 4.6–6.2)
RBC # BLD: 3.35 M/CU MM (ref 4.6–6.2)
RBC # BLD: 4.06 M/CU MM (ref 4.6–6.2)
RBC URINE: ABNORMAL /HPF (ref 0–3)
RETICULOCYTE COUNT PCT: 2.6 % (ref 0.2–2.2)
RHINOVIRUS ENTEROVIRUS PCR: NOT DETECTED
RSV PCR: NOT DETECTED
SALICYLATE LEVEL: <0.3 MG/DL (ref 15–30)
SARS-COV-2, NAAT: NOT DETECTED
SARS-COV-2: NOT DETECTED
SEGMENTED NEUTROPHILS ABSOLUTE COUNT: 12.6 K/CU MM
SEGMENTED NEUTROPHILS ABSOLUTE COUNT: 13.4 K/CU MM
SEGMENTED NEUTROPHILS ABSOLUTE COUNT: 14.9 K/CU MM
SEGMENTED NEUTROPHILS ABSOLUTE COUNT: 15 K/CU MM
SEGMENTED NEUTROPHILS ABSOLUTE COUNT: 6.6 K/CU MM
SEGMENTED NEUTROPHILS ABSOLUTE COUNT: 6.6 K/CU MM
SEGMENTED NEUTROPHILS RELATIVE PERCENT: 74.6 % (ref 36–66)
SEGMENTED NEUTROPHILS RELATIVE PERCENT: 75.2 % (ref 36–66)
SEGMENTED NEUTROPHILS RELATIVE PERCENT: 80.7 % (ref 36–66)
SEGMENTED NEUTROPHILS RELATIVE PERCENT: 82.2 % (ref 36–66)
SEGMENTED NEUTROPHILS RELATIVE PERCENT: 90 % (ref 36–66)
SEGMENTED NEUTROPHILS RELATIVE PERCENT: 92.4 % (ref 36–66)
SODIUM BLD-SCNC: 126 MMOL/L (ref 135–145)
SODIUM BLD-SCNC: 127 MMOL/L (ref 135–145)
SODIUM BLD-SCNC: 128 MMOL/L (ref 135–145)
SODIUM BLD-SCNC: 132 MMOL/L (ref 135–145)
SOURCE: NORMAL
SPECIFIC GRAVITY UA: 1.02 (ref 1–1.03)
SPECIMEN: ABNORMAL
SPECIMEN: NORMAL
STATUS: NORMAL
STATUS: NORMAL
T4 FREE: 1.27 NG/DL (ref 0.9–1.8)
TOTAL CK: 181 IU/L (ref 38–174)
TOTAL IMMATURE NEUTOROPHIL: 0.02 K/CU MM
TOTAL IMMATURE NEUTOROPHIL: 0.03 K/CU MM
TOTAL IMMATURE NEUTOROPHIL: 0.1 K/CU MM
TOTAL IMMATURE NEUTOROPHIL: 0.14 K/CU MM
TOTAL IMMATURE NEUTOROPHIL: 0.17 K/CU MM
TOTAL IRON BINDING CAPACITY: 139 UG/DL (ref 250–450)
TOTAL NUCLEATED RBC: 0 K/CU MM
TOTAL PROTEIN: 6.1 GM/DL (ref 6.4–8.2)
TRANSFUSION STATUS: NORMAL
TRANSFUSION STATUS: NORMAL
TRICHOMONAS: ABNORMAL /HPF
TROPONIN T: <0.01 NG/ML
TROPONIN T: <0.01 NG/ML
TSH HIGH SENSITIVITY: 1.99 UIU/ML (ref 0.27–4.2)
UNIT DIVISION: 0
UNIT DIVISION: 0
UNIT NUMBER: NORMAL
UNIT NUMBER: NORMAL
UNSATURATED IRON BINDING CAPACITY: 114 UG/DL (ref 110–370)
UROBILINOGEN, URINE: 0.2 MG/DL (ref 0.2–1)
VANCOMYCIN RANDOM: <4 UG/ML
VANCOMYCIN TROUGH: 28.4 UG/ML (ref 10–20)
VITAMIN B-12: 517.2 PG/ML (ref 211–911)
WBC # BLD: 15.6 K/CU MM (ref 4–10.5)
WBC # BLD: 16.2 K/CU MM (ref 4–10.5)
WBC # BLD: 16.3 K/CU MM (ref 4–10.5)
WBC # BLD: 16.5 K/CU MM (ref 4–10.5)
WBC # BLD: 8.8 K/CU MM (ref 4–10.5)
WBC # BLD: 8.9 K/CU MM (ref 4–10.5)
WBC UA: <1 /HPF (ref 0–2)

## 2022-01-01 PROCEDURE — 6370000000 HC RX 637 (ALT 250 FOR IP): Performed by: INTERNAL MEDICINE

## 2022-01-01 PROCEDURE — 97167 OT EVAL HIGH COMPLEX 60 MIN: CPT

## 2022-01-01 PROCEDURE — 1200000000 HC SEMI PRIVATE

## 2022-01-01 PROCEDURE — 6360000002 HC RX W HCPCS: Performed by: ORTHOPAEDIC SURGERY

## 2022-01-01 PROCEDURE — 2580000003 HC RX 258: Performed by: PHYSICIAN ASSISTANT

## 2022-01-01 PROCEDURE — 6370000000 HC RX 637 (ALT 250 FOR IP): Performed by: STUDENT IN AN ORGANIZED HEALTH CARE EDUCATION/TRAINING PROGRAM

## 2022-01-01 PROCEDURE — 2580000003 HC RX 258: Performed by: ORTHOPAEDIC SURGERY

## 2022-01-01 PROCEDURE — 6360000002 HC RX W HCPCS: Performed by: INTERNAL MEDICINE

## 2022-01-01 PROCEDURE — 6360000002 HC RX W HCPCS: Performed by: PHYSICIAN ASSISTANT

## 2022-01-01 PROCEDURE — 85014 HEMATOCRIT: CPT

## 2022-01-01 PROCEDURE — 2700000000 HC OXYGEN THERAPY PER DAY

## 2022-01-01 PROCEDURE — 94640 AIRWAY INHALATION TREATMENT: CPT

## 2022-01-01 PROCEDURE — 70450 CT HEAD/BRAIN W/O DYE: CPT

## 2022-01-01 PROCEDURE — 94150 VITAL CAPACITY TEST: CPT

## 2022-01-01 PROCEDURE — 83615 LACTATE (LD) (LDH) ENZYME: CPT

## 2022-01-01 PROCEDURE — 85610 PROTHROMBIN TIME: CPT

## 2022-01-01 PROCEDURE — 86900 BLOOD TYPING SEROLOGIC ABO: CPT

## 2022-01-01 PROCEDURE — 6370000000 HC RX 637 (ALT 250 FOR IP): Performed by: ORTHOPAEDIC SURGERY

## 2022-01-01 PROCEDURE — 85007 BL SMEAR W/DIFF WBC COUNT: CPT

## 2022-01-01 PROCEDURE — 82607 VITAMIN B-12: CPT

## 2022-01-01 PROCEDURE — 6370000000 HC RX 637 (ALT 250 FOR IP): Performed by: SURGERY

## 2022-01-01 PROCEDURE — 2709999900 HC NON-CHARGEABLE SUPPLY: Performed by: ORTHOPAEDIC SURGERY

## 2022-01-01 PROCEDURE — 84484 ASSAY OF TROPONIN QUANT: CPT

## 2022-01-01 PROCEDURE — 36415 COLL VENOUS BLD VENIPUNCTURE: CPT

## 2022-01-01 PROCEDURE — 94761 N-INVAS EAR/PLS OXIMETRY MLT: CPT

## 2022-01-01 PROCEDURE — 74177 CT ABD & PELVIS W/CONTRAST: CPT

## 2022-01-01 PROCEDURE — 2500000003 HC RX 250 WO HCPCS: Performed by: NURSE ANESTHETIST, CERTIFIED REGISTERED

## 2022-01-01 PROCEDURE — 74176 CT ABD & PELVIS W/O CONTRAST: CPT

## 2022-01-01 PROCEDURE — 2580000003 HC RX 258

## 2022-01-01 PROCEDURE — 71045 X-RAY EXAM CHEST 1 VIEW: CPT

## 2022-01-01 PROCEDURE — 84443 ASSAY THYROID STIM HORMONE: CPT

## 2022-01-01 PROCEDURE — 3700000000 HC ANESTHESIA ATTENDED CARE: Performed by: ORTHOPAEDIC SURGERY

## 2022-01-01 PROCEDURE — 97530 THERAPEUTIC ACTIVITIES: CPT

## 2022-01-01 PROCEDURE — 97110 THERAPEUTIC EXERCISES: CPT

## 2022-01-01 PROCEDURE — 86850 RBC ANTIBODY SCREEN: CPT

## 2022-01-01 PROCEDURE — 3700000001 HC ADD 15 MINUTES (ANESTHESIA): Performed by: ORTHOPAEDIC SURGERY

## 2022-01-01 PROCEDURE — 85025 COMPLETE CBC W/AUTO DIFF WBC: CPT

## 2022-01-01 PROCEDURE — 83605 ASSAY OF LACTIC ACID: CPT

## 2022-01-01 PROCEDURE — 82962 GLUCOSE BLOOD TEST: CPT

## 2022-01-01 PROCEDURE — 73502 X-RAY EXAM HIP UNI 2-3 VIEWS: CPT

## 2022-01-01 PROCEDURE — 85027 COMPLETE CBC AUTOMATED: CPT

## 2022-01-01 PROCEDURE — 87150 DNA/RNA AMPLIFIED PROBE: CPT

## 2022-01-01 PROCEDURE — 83550 IRON BINDING TEST: CPT

## 2022-01-01 PROCEDURE — 93010 ELECTROCARDIOGRAM REPORT: CPT | Performed by: INTERNAL MEDICINE

## 2022-01-01 PROCEDURE — 99232 SBSQ HOSP IP/OBS MODERATE 35: CPT | Performed by: PHYSICIAN ASSISTANT

## 2022-01-01 PROCEDURE — G0480 DRUG TEST DEF 1-7 CLASSES: HCPCS

## 2022-01-01 PROCEDURE — 82728 ASSAY OF FERRITIN: CPT

## 2022-01-01 PROCEDURE — 99222 1ST HOSP IP/OBS MODERATE 55: CPT | Performed by: PHYSICIAN ASSISTANT

## 2022-01-01 PROCEDURE — 83010 ASSAY OF HAPTOGLOBIN QUANT: CPT

## 2022-01-01 PROCEDURE — 94664 DEMO&/EVAL PT USE INHALER: CPT

## 2022-01-01 PROCEDURE — 85045 AUTOMATED RETICULOCYTE COUNT: CPT

## 2022-01-01 PROCEDURE — 72131 CT LUMBAR SPINE W/O DYE: CPT

## 2022-01-01 PROCEDURE — 80048 BASIC METABOLIC PNL TOTAL CA: CPT

## 2022-01-01 PROCEDURE — 0SRS0JA REPLACEMENT OF LEFT HIP JOINT, FEMORAL SURFACE WITH SYNTHETIC SUBSTITUTE, UNCEMENTED, OPEN APPROACH: ICD-10-PCS | Performed by: ORTHOPAEDIC SURGERY

## 2022-01-01 PROCEDURE — 82803 BLOOD GASES ANY COMBINATION: CPT

## 2022-01-01 PROCEDURE — 0BH17EZ INSERTION OF ENDOTRACHEAL AIRWAY INTO TRACHEA, VIA NATURAL OR ARTIFICIAL OPENING: ICD-10-PCS | Performed by: INTERNAL MEDICINE

## 2022-01-01 PROCEDURE — 99232 SBSQ HOSP IP/OBS MODERATE 35: CPT | Performed by: ORTHOPAEDIC SURGERY

## 2022-01-01 PROCEDURE — 2060000000 HC ICU INTERMEDIATE R&B

## 2022-01-01 PROCEDURE — 85730 THROMBOPLASTIN TIME PARTIAL: CPT

## 2022-01-01 PROCEDURE — 97116 GAIT TRAINING THERAPY: CPT

## 2022-01-01 PROCEDURE — 97163 PT EVAL HIGH COMPLEX 45 MIN: CPT

## 2022-01-01 PROCEDURE — 27236 TREAT THIGH FRACTURE: CPT | Performed by: PHYSICIAN ASSISTANT

## 2022-01-01 PROCEDURE — P9041 ALBUMIN (HUMAN),5%, 50ML: HCPCS | Performed by: NURSE ANESTHETIST, CERTIFIED REGISTERED

## 2022-01-01 PROCEDURE — 84100 ASSAY OF PHOSPHORUS: CPT

## 2022-01-01 PROCEDURE — 94002 VENT MGMT INPAT INIT DAY: CPT

## 2022-01-01 PROCEDURE — 31500 INSERT EMERGENCY AIRWAY: CPT

## 2022-01-01 PROCEDURE — 83735 ASSAY OF MAGNESIUM: CPT

## 2022-01-01 PROCEDURE — 27236 TREAT THIGH FRACTURE: CPT | Performed by: ORTHOPAEDIC SURGERY

## 2022-01-01 PROCEDURE — 51702 INSERT TEMP BLADDER CATH: CPT

## 2022-01-01 PROCEDURE — 2580000003 HC RX 258: Performed by: SURGERY

## 2022-01-01 PROCEDURE — 93306 TTE W/DOPPLER COMPLETE: CPT

## 2022-01-01 PROCEDURE — 5A1935Z RESPIRATORY VENTILATION, LESS THAN 24 CONSECUTIVE HOURS: ICD-10-PCS | Performed by: INTERNAL MEDICINE

## 2022-01-01 PROCEDURE — 93005 ELECTROCARDIOGRAM TRACING: CPT | Performed by: PHYSICIAN ASSISTANT

## 2022-01-01 PROCEDURE — 2500000003 HC RX 250 WO HCPCS

## 2022-01-01 PROCEDURE — 83690 ASSAY OF LIPASE: CPT

## 2022-01-01 PROCEDURE — 6360000004 HC RX CONTRAST MEDICATION: Performed by: STUDENT IN AN ORGANIZED HEALTH CARE EDUCATION/TRAINING PROGRAM

## 2022-01-01 PROCEDURE — 2580000003 HC RX 258: Performed by: STUDENT IN AN ORGANIZED HEALTH CARE EDUCATION/TRAINING PROGRAM

## 2022-01-01 PROCEDURE — C1776 JOINT DEVICE (IMPLANTABLE): HCPCS | Performed by: ORTHOPAEDIC SURGERY

## 2022-01-01 PROCEDURE — 6360000002 HC RX W HCPCS: Performed by: NURSE ANESTHETIST, CERTIFIED REGISTERED

## 2022-01-01 PROCEDURE — 7100000001 HC PACU RECOVERY - ADDTL 15 MIN: Performed by: ORTHOPAEDIC SURGERY

## 2022-01-01 PROCEDURE — 83540 ASSAY OF IRON: CPT

## 2022-01-01 PROCEDURE — 89220 SPUTUM SPECIMEN COLLECTION: CPT

## 2022-01-01 PROCEDURE — 99285 EMERGENCY DEPT VISIT HI MDM: CPT

## 2022-01-01 PROCEDURE — 81003 URINALYSIS AUTO W/O SCOPE: CPT

## 2022-01-01 PROCEDURE — 80202 ASSAY OF VANCOMYCIN: CPT

## 2022-01-01 PROCEDURE — 6360000002 HC RX W HCPCS: Performed by: SURGERY

## 2022-01-01 PROCEDURE — 82550 ASSAY OF CK (CPK): CPT

## 2022-01-01 PROCEDURE — 72148 MRI LUMBAR SPINE W/O DYE: CPT

## 2022-01-01 PROCEDURE — 93880 EXTRACRANIAL BILAT STUDY: CPT

## 2022-01-01 PROCEDURE — 84439 ASSAY OF FREE THYROXINE: CPT

## 2022-01-01 PROCEDURE — 86901 BLOOD TYPING SEROLOGIC RH(D): CPT

## 2022-01-01 PROCEDURE — 97535 SELF CARE MNGMENT TRAINING: CPT

## 2022-01-01 PROCEDURE — 6360000002 HC RX W HCPCS: Performed by: STUDENT IN AN ORGANIZED HEALTH CARE EDUCATION/TRAINING PROGRAM

## 2022-01-01 PROCEDURE — A4217 STERILE WATER/SALINE, 500 ML: HCPCS | Performed by: ORTHOPAEDIC SURGERY

## 2022-01-01 PROCEDURE — 82140 ASSAY OF AMMONIA: CPT

## 2022-01-01 PROCEDURE — 6370000000 HC RX 637 (ALT 250 FOR IP): Performed by: PHYSICIAN ASSISTANT

## 2022-01-01 PROCEDURE — 96360 HYDRATION IV INFUSION INIT: CPT

## 2022-01-01 PROCEDURE — 96361 HYDRATE IV INFUSION ADD-ON: CPT

## 2022-01-01 PROCEDURE — 72170 X-RAY EXAM OF PELVIS: CPT

## 2022-01-01 PROCEDURE — 72125 CT NECK SPINE W/O DYE: CPT

## 2022-01-01 PROCEDURE — 72080 X-RAY EXAM THORACOLMB 2/> VW: CPT

## 2022-01-01 PROCEDURE — 87040 BLOOD CULTURE FOR BACTERIA: CPT

## 2022-01-01 PROCEDURE — 80053 COMPREHEN METABOLIC PANEL: CPT

## 2022-01-01 PROCEDURE — 82746 ASSAY OF FOLIC ACID SERUM: CPT

## 2022-01-01 PROCEDURE — 87635 SARS-COV-2 COVID-19 AMP PRB: CPT

## 2022-01-01 PROCEDURE — 3600000004 HC SURGERY LEVEL 4 BASE: Performed by: ORTHOPAEDIC SURGERY

## 2022-01-01 PROCEDURE — 2500000003 HC RX 250 WO HCPCS: Performed by: ORTHOPAEDIC SURGERY

## 2022-01-01 PROCEDURE — 3600000014 HC SURGERY LEVEL 4 ADDTL 15MIN: Performed by: ORTHOPAEDIC SURGERY

## 2022-01-01 PROCEDURE — 85018 HEMOGLOBIN: CPT

## 2022-01-01 PROCEDURE — 0202U NFCT DS 22 TRGT SARS-COV-2: CPT

## 2022-01-01 PROCEDURE — 7100000000 HC PACU RECOVERY - FIRST 15 MIN: Performed by: ORTHOPAEDIC SURGERY

## 2022-01-01 RX ORDER — ONDANSETRON 2 MG/ML
4 INJECTION INTRAMUSCULAR; INTRAVENOUS ONCE
Status: COMPLETED | OUTPATIENT
Start: 2022-01-01 | End: 2022-01-01

## 2022-01-01 RX ORDER — MAGNESIUM SULFATE IN WATER 40 MG/ML
2000 INJECTION, SOLUTION INTRAVENOUS ONCE
Status: COMPLETED | OUTPATIENT
Start: 2022-01-01 | End: 2022-01-01

## 2022-01-01 RX ORDER — ONDANSETRON 2 MG/ML
4 INJECTION INTRAMUSCULAR; INTRAVENOUS EVERY 6 HOURS PRN
Status: DISCONTINUED | OUTPATIENT
Start: 2022-01-01 | End: 2022-01-01 | Stop reason: HOSPADM

## 2022-01-01 RX ORDER — MEPERIDINE HYDROCHLORIDE 25 MG/ML
6.25 INJECTION INTRAMUSCULAR; INTRAVENOUS; SUBCUTANEOUS EVERY 5 MIN PRN
Status: DISCONTINUED | OUTPATIENT
Start: 2022-01-01 | End: 2022-01-01 | Stop reason: HOSPADM

## 2022-01-01 RX ORDER — ENOXAPARIN SODIUM 100 MG/ML
40 INJECTION SUBCUTANEOUS DAILY
Status: DISCONTINUED | OUTPATIENT
Start: 2022-01-01 | End: 2022-01-01

## 2022-01-01 RX ORDER — FENTANYL CITRATE 50 UG/ML
50 INJECTION, SOLUTION INTRAMUSCULAR; INTRAVENOUS EVERY 5 MIN PRN
Status: DISCONTINUED | OUTPATIENT
Start: 2022-01-01 | End: 2022-01-01 | Stop reason: HOSPADM

## 2022-01-01 RX ORDER — PANTOPRAZOLE SODIUM 40 MG/10ML
40 INJECTION, POWDER, LYOPHILIZED, FOR SOLUTION INTRAVENOUS 2 TIMES DAILY
Status: DISCONTINUED | OUTPATIENT
Start: 2022-01-01 | End: 2022-01-01

## 2022-01-01 RX ORDER — MIDAZOLAM HYDROCHLORIDE 2 MG/2ML
2 INJECTION, SOLUTION INTRAMUSCULAR; INTRAVENOUS
Status: DISCONTINUED | OUTPATIENT
Start: 2022-01-01 | End: 2022-01-01 | Stop reason: HOSPADM

## 2022-01-01 RX ORDER — TRANEXAMIC ACID 100 MG/ML
INJECTION, SOLUTION INTRAVENOUS
Status: COMPLETED | OUTPATIENT
Start: 2022-01-01 | End: 2022-01-01

## 2022-01-01 RX ORDER — POLYETHYLENE GLYCOL 3350 17 G/17G
17 POWDER, FOR SOLUTION ORAL DAILY PRN
Status: DISCONTINUED | OUTPATIENT
Start: 2022-01-01 | End: 2022-01-01 | Stop reason: HOSPADM

## 2022-01-01 RX ORDER — SODIUM CHLORIDE 0.9 % (FLUSH) 0.9 %
5-40 SYRINGE (ML) INJECTION EVERY 12 HOURS SCHEDULED
Status: DISCONTINUED | OUTPATIENT
Start: 2022-01-01 | End: 2022-01-01 | Stop reason: HOSPADM

## 2022-01-01 RX ORDER — FENTANYL CITRATE-0.9 % NACL/PF 10 MCG/ML
25-200 PLASTIC BAG, INJECTION (ML) INTRAVENOUS CONTINUOUS
Status: DISCONTINUED | OUTPATIENT
Start: 2022-01-01 | End: 2022-01-01 | Stop reason: HOSPADM

## 2022-01-01 RX ORDER — TRANEXAMIC ACID 10 MG/ML
1000 INJECTION, SOLUTION INTRAVENOUS
Status: DISCONTINUED | OUTPATIENT
Start: 2022-01-01 | End: 2022-01-01 | Stop reason: HOSPADM

## 2022-01-01 RX ORDER — SODIUM CHLORIDE 0.9 % (FLUSH) 0.9 %
5-40 SYRINGE (ML) INJECTION PRN
Status: DISCONTINUED | OUTPATIENT
Start: 2022-01-01 | End: 2022-01-01 | Stop reason: HOSPADM

## 2022-01-01 RX ORDER — SODIUM CHLORIDE, SODIUM LACTATE, POTASSIUM CHLORIDE, CALCIUM CHLORIDE 600; 310; 30; 20 MG/100ML; MG/100ML; MG/100ML; MG/100ML
INJECTION, SOLUTION INTRAVENOUS CONTINUOUS
Status: DISCONTINUED | OUTPATIENT
Start: 2022-01-01 | End: 2022-01-01 | Stop reason: HOSPADM

## 2022-01-01 RX ORDER — PROCHLORPERAZINE EDISYLATE 5 MG/ML
5 INJECTION INTRAMUSCULAR; INTRAVENOUS
Status: DISCONTINUED | OUTPATIENT
Start: 2022-01-01 | End: 2022-01-01 | Stop reason: HOSPADM

## 2022-01-01 RX ORDER — LATANOPROST 50 UG/ML
2 SOLUTION/ DROPS OPHTHALMIC NIGHTLY
Status: DISCONTINUED | OUTPATIENT
Start: 2022-01-01 | End: 2022-01-01 | Stop reason: CLARIF

## 2022-01-01 RX ORDER — HYDRALAZINE HYDROCHLORIDE 20 MG/ML
10 INJECTION INTRAMUSCULAR; INTRAVENOUS
Status: DISCONTINUED | OUTPATIENT
Start: 2022-01-01 | End: 2022-01-01 | Stop reason: HOSPADM

## 2022-01-01 RX ORDER — LANOLIN ALCOHOL/MO/W.PET/CERES
400 CREAM (GRAM) TOPICAL DAILY
Status: DISCONTINUED | OUTPATIENT
Start: 2022-01-01 | End: 2022-01-01 | Stop reason: HOSPADM

## 2022-01-01 RX ORDER — SODIUM CHLORIDE 0.9 % (FLUSH) 0.9 %
5-40 SYRINGE (ML) INJECTION EVERY 12 HOURS SCHEDULED
Status: DISCONTINUED | OUTPATIENT
Start: 2022-01-01 | End: 2022-01-01 | Stop reason: SDUPTHER

## 2022-01-01 RX ORDER — LIDOCAINE HYDROCHLORIDE 20 MG/ML
INJECTION, SOLUTION INTRAVENOUS PRN
Status: DISCONTINUED | OUTPATIENT
Start: 2022-01-01 | End: 2022-01-01 | Stop reason: SDUPTHER

## 2022-01-01 RX ORDER — LORAZEPAM 1 MG/1
4 TABLET ORAL
Status: DISCONTINUED | OUTPATIENT
Start: 2022-01-01 | End: 2022-01-01

## 2022-01-01 RX ORDER — FENTANYL CITRATE 50 UG/ML
INJECTION, SOLUTION INTRAMUSCULAR; INTRAVENOUS PRN
Status: DISCONTINUED | OUTPATIENT
Start: 2022-01-01 | End: 2022-01-01 | Stop reason: SDUPTHER

## 2022-01-01 RX ORDER — DORZOLAMIDE HCL 20 MG/ML
1 SOLUTION/ DROPS OPHTHALMIC 3 TIMES DAILY
Status: DISCONTINUED | OUTPATIENT
Start: 2022-01-01 | End: 2022-01-01 | Stop reason: CLARIF

## 2022-01-01 RX ORDER — FOLIC ACID 1 MG/1
1 TABLET ORAL DAILY
Status: DISCONTINUED | OUTPATIENT
Start: 2022-01-01 | End: 2022-01-01 | Stop reason: HOSPADM

## 2022-01-01 RX ORDER — PROPOFOL 10 MG/ML
INJECTION, EMULSION INTRAVENOUS PRN
Status: DISCONTINUED | OUTPATIENT
Start: 2022-01-01 | End: 2022-01-01 | Stop reason: SDUPTHER

## 2022-01-01 RX ORDER — ALBUMIN, HUMAN INJ 5% 5 %
SOLUTION INTRAVENOUS PRN
Status: DISCONTINUED | OUTPATIENT
Start: 2022-01-01 | End: 2022-01-01 | Stop reason: SDUPTHER

## 2022-01-01 RX ORDER — LOSARTAN POTASSIUM 25 MG/1
25 TABLET ORAL DAILY
Status: DISCONTINUED | OUTPATIENT
Start: 2022-01-01 | End: 2022-01-01

## 2022-01-01 RX ORDER — SODIUM CHLORIDE 0.9 % (FLUSH) 0.9 %
5-40 SYRINGE (ML) INJECTION PRN
Status: DISCONTINUED | OUTPATIENT
Start: 2022-01-01 | End: 2022-01-01 | Stop reason: SDUPTHER

## 2022-01-01 RX ORDER — LANOLIN ALCOHOL/MO/W.PET/CERES
6 CREAM (GRAM) TOPICAL NIGHTLY
Status: DISCONTINUED | OUTPATIENT
Start: 2022-01-01 | End: 2022-01-01 | Stop reason: HOSPADM

## 2022-01-01 RX ORDER — ASPIRIN 81 MG/1
81 TABLET ORAL DAILY
Status: DISCONTINUED | OUTPATIENT
Start: 2022-01-01 | End: 2022-01-01 | Stop reason: HOSPADM

## 2022-01-01 RX ORDER — GUAIFENESIN 600 MG/1
600 TABLET, EXTENDED RELEASE ORAL 2 TIMES DAILY
Status: DISCONTINUED | OUTPATIENT
Start: 2022-01-01 | End: 2022-01-01 | Stop reason: HOSPADM

## 2022-01-01 RX ORDER — PROPOFOL 10 MG/ML
5-80 INJECTION, EMULSION INTRAVENOUS CONTINUOUS
Status: DISCONTINUED | OUTPATIENT
Start: 2022-01-01 | End: 2022-01-01 | Stop reason: HOSPADM

## 2022-01-01 RX ORDER — ONDANSETRON 4 MG/1
4 TABLET, ORALLY DISINTEGRATING ORAL EVERY 8 HOURS PRN
Status: DISCONTINUED | OUTPATIENT
Start: 2022-01-01 | End: 2022-01-01 | Stop reason: HOSPADM

## 2022-01-01 RX ORDER — LORAZEPAM 1 MG/1
2 TABLET ORAL
Status: DISCONTINUED | OUTPATIENT
Start: 2022-01-01 | End: 2022-01-01

## 2022-01-01 RX ORDER — LANOLIN ALCOHOL/MO/W.PET/CERES
100 CREAM (GRAM) TOPICAL DAILY
Status: DISCONTINUED | OUTPATIENT
Start: 2022-01-01 | End: 2022-01-01 | Stop reason: SDUPTHER

## 2022-01-01 RX ORDER — ALPRAZOLAM 0.25 MG/1
0.25 TABLET ORAL EVERY 6 HOURS PRN
Status: DISCONTINUED | OUTPATIENT
Start: 2022-01-01 | End: 2022-01-01 | Stop reason: HOSPADM

## 2022-01-01 RX ORDER — LORAZEPAM 2 MG/ML
4 INJECTION INTRAMUSCULAR
Status: DISCONTINUED | OUTPATIENT
Start: 2022-01-01 | End: 2022-01-01

## 2022-01-01 RX ORDER — DEXAMETHASONE SODIUM PHOSPHATE 4 MG/ML
INJECTION, SOLUTION INTRA-ARTICULAR; INTRALESIONAL; INTRAMUSCULAR; INTRAVENOUS; SOFT TISSUE PRN
Status: DISCONTINUED | OUTPATIENT
Start: 2022-01-01 | End: 2022-01-01 | Stop reason: SDUPTHER

## 2022-01-01 RX ORDER — PANTOPRAZOLE SODIUM 40 MG/10ML
80 INJECTION, POWDER, LYOPHILIZED, FOR SOLUTION INTRAVENOUS ONCE
Status: DISCONTINUED | OUTPATIENT
Start: 2022-01-01 | End: 2022-01-01 | Stop reason: HOSPADM

## 2022-01-01 RX ORDER — SODIUM CHLORIDE 9 MG/ML
INJECTION, SOLUTION INTRAVENOUS PRN
Status: DISCONTINUED | OUTPATIENT
Start: 2022-01-01 | End: 2022-01-01

## 2022-01-01 RX ORDER — LORAZEPAM 1 MG/1
1 TABLET ORAL
Status: DISCONTINUED | OUTPATIENT
Start: 2022-01-01 | End: 2022-01-01

## 2022-01-01 RX ORDER — DORZOLAMIDE HCL 20 MG/ML
1 SOLUTION/ DROPS OPHTHALMIC 3 TIMES DAILY
Status: DISCONTINUED | OUTPATIENT
Start: 2022-01-01 | End: 2022-01-01 | Stop reason: HOSPADM

## 2022-01-01 RX ORDER — NICOTINE 21 MG/24HR
1 PATCH, TRANSDERMAL 24 HOURS TRANSDERMAL DAILY
Status: DISCONTINUED | OUTPATIENT
Start: 2022-01-01 | End: 2022-01-01 | Stop reason: HOSPADM

## 2022-01-01 RX ORDER — POLYVINYL ALCOHOL 14 MG/ML
1 SOLUTION/ DROPS OPHTHALMIC 3 TIMES DAILY
Status: DISCONTINUED | OUTPATIENT
Start: 2022-01-01 | End: 2022-01-01 | Stop reason: HOSPADM

## 2022-01-01 RX ORDER — HALOPERIDOL 5 MG/ML
1 INJECTION INTRAMUSCULAR
Status: DISCONTINUED | OUTPATIENT
Start: 2022-01-01 | End: 2022-01-01 | Stop reason: HOSPADM

## 2022-01-01 RX ORDER — 0.9 % SODIUM CHLORIDE 0.9 %
1000 INTRAVENOUS SOLUTION INTRAVENOUS ONCE
Status: COMPLETED | OUTPATIENT
Start: 2022-01-01 | End: 2022-01-01

## 2022-01-01 RX ORDER — CLOPIDOGREL BISULFATE 75 MG/1
75 TABLET ORAL DAILY
COMMUNITY

## 2022-01-01 RX ORDER — LORAZEPAM 2 MG/ML
2 INJECTION INTRAMUSCULAR
Status: DISCONTINUED | OUTPATIENT
Start: 2022-01-01 | End: 2022-01-01

## 2022-01-01 RX ORDER — TRAVOPROST OPHTHALMIC SOLUTION 0.04 MG/ML
1 SOLUTION OPHTHALMIC NIGHTLY
Status: DISCONTINUED | OUTPATIENT
Start: 2022-01-01 | End: 2022-01-01 | Stop reason: HOSPADM

## 2022-01-01 RX ORDER — BRIMONIDINE TARTRATE 2 MG/ML
1 SOLUTION/ DROPS OPHTHALMIC 2 TIMES DAILY
Status: DISCONTINUED | OUTPATIENT
Start: 2022-01-01 | End: 2022-01-01 | Stop reason: HOSPADM

## 2022-01-01 RX ORDER — ENOXAPARIN SODIUM 100 MG/ML
40 INJECTION SUBCUTANEOUS NIGHTLY
Status: DISCONTINUED | OUTPATIENT
Start: 2022-01-01 | End: 2022-01-01 | Stop reason: HOSPADM

## 2022-01-01 RX ORDER — SODIUM CHLORIDE 9 MG/ML
INJECTION, SOLUTION INTRAVENOUS PRN
Status: DISCONTINUED | OUTPATIENT
Start: 2022-01-01 | End: 2022-01-01 | Stop reason: HOSPADM

## 2022-01-01 RX ORDER — ASPIRIN 81 MG/1
81 TABLET ORAL DAILY
Status: DISCONTINUED | OUTPATIENT
Start: 2022-01-01 | End: 2022-01-01 | Stop reason: SDUPTHER

## 2022-01-01 RX ORDER — FENTANYL CITRATE 50 UG/ML
25 INJECTION, SOLUTION INTRAMUSCULAR; INTRAVENOUS
Status: DISCONTINUED | OUTPATIENT
Start: 2022-01-01 | End: 2022-01-01

## 2022-01-01 RX ORDER — SODIUM CHLORIDE 9 MG/ML
500 INJECTION, SOLUTION INTRAVENOUS PRN
Status: DISCONTINUED | OUTPATIENT
Start: 2022-01-01 | End: 2022-01-01 | Stop reason: HOSPADM

## 2022-01-01 RX ORDER — ROCURONIUM BROMIDE 10 MG/ML
INJECTION, SOLUTION INTRAVENOUS PRN
Status: DISCONTINUED | OUTPATIENT
Start: 2022-01-01 | End: 2022-01-01 | Stop reason: SDUPTHER

## 2022-01-01 RX ORDER — TROSPIUM CHLORIDE 20 MG/1
20 TABLET, FILM COATED ORAL NIGHTLY
Status: DISCONTINUED | OUTPATIENT
Start: 2022-01-01 | End: 2022-01-01 | Stop reason: HOSPADM

## 2022-01-01 RX ORDER — LORAZEPAM 1 MG/1
3 TABLET ORAL
Status: DISCONTINUED | OUTPATIENT
Start: 2022-01-01 | End: 2022-01-01

## 2022-01-01 RX ORDER — MIDODRINE HYDROCHLORIDE 5 MG/1
10 TABLET ORAL
Status: DISCONTINUED | OUTPATIENT
Start: 2022-01-01 | End: 2022-01-01 | Stop reason: HOSPADM

## 2022-01-01 RX ORDER — LORAZEPAM 2 MG/ML
1 INJECTION INTRAMUSCULAR
Status: DISCONTINUED | OUTPATIENT
Start: 2022-01-01 | End: 2022-01-01

## 2022-01-01 RX ORDER — SODIUM CHLORIDE FOR INHALATION 3 %
4 VIAL, NEBULIZER (ML) INHALATION PRN
Status: DISCONTINUED | OUTPATIENT
Start: 2022-01-01 | End: 2022-01-01 | Stop reason: HOSPADM

## 2022-01-01 RX ORDER — LABETALOL HYDROCHLORIDE 5 MG/ML
10 INJECTION, SOLUTION INTRAVENOUS
Status: DISCONTINUED | OUTPATIENT
Start: 2022-01-01 | End: 2022-01-01 | Stop reason: HOSPADM

## 2022-01-01 RX ORDER — CLOPIDOGREL BISULFATE 75 MG/1
75 TABLET ORAL DAILY
Status: DISCONTINUED | OUTPATIENT
Start: 2022-01-01 | End: 2022-01-01 | Stop reason: HOSPADM

## 2022-01-01 RX ORDER — NOREPINEPHRINE BIT/0.9 % NACL 16MG/250ML
INFUSION BOTTLE (ML) INTRAVENOUS
Status: DISCONTINUED
Start: 2022-01-01 | End: 2022-01-01 | Stop reason: HOSPADM

## 2022-01-01 RX ORDER — SODIUM CHLORIDE, SODIUM LACTATE, POTASSIUM CHLORIDE, AND CALCIUM CHLORIDE .6; .31; .03; .02 G/100ML; G/100ML; G/100ML; G/100ML
1000 INJECTION, SOLUTION INTRAVENOUS ONCE
Status: DISCONTINUED | OUTPATIENT
Start: 2022-01-01 | End: 2022-01-01 | Stop reason: HOSPADM

## 2022-01-01 RX ORDER — FENTANYL CITRATE 50 UG/ML
50 INJECTION, SOLUTION INTRAMUSCULAR; INTRAVENOUS
Status: DISCONTINUED | OUTPATIENT
Start: 2022-01-01 | End: 2022-01-01 | Stop reason: HOSPADM

## 2022-01-01 RX ORDER — ATROPINE SULFATE 0.1 MG/ML
INJECTION INTRAVENOUS
Status: DISCONTINUED
Start: 2022-01-01 | End: 2022-01-01 | Stop reason: HOSPADM

## 2022-01-01 RX ORDER — CHLORHEXIDINE GLUCONATE 0.12 MG/ML
15 RINSE ORAL 2 TIMES DAILY
Status: DISCONTINUED | OUTPATIENT
Start: 2022-01-01 | End: 2022-01-01 | Stop reason: HOSPADM

## 2022-01-01 RX ORDER — ATROPINE SULFATE 0.1 MG/ML
1 INJECTION INTRAVENOUS ONCE
Status: DISCONTINUED | OUTPATIENT
Start: 2022-01-01 | End: 2022-01-01 | Stop reason: HOSPADM

## 2022-01-01 RX ORDER — METOPROLOL TARTRATE 50 MG/1
50 TABLET, FILM COATED ORAL 2 TIMES DAILY
Status: DISCONTINUED | OUTPATIENT
Start: 2022-01-01 | End: 2022-01-01 | Stop reason: HOSPADM

## 2022-01-01 RX ORDER — POTASSIUM CHLORIDE 20 MEQ/1
40 TABLET, EXTENDED RELEASE ORAL ONCE
Status: COMPLETED | OUTPATIENT
Start: 2022-01-01 | End: 2022-01-01

## 2022-01-01 RX ORDER — ATORVASTATIN CALCIUM 40 MG/1
40 TABLET, FILM COATED ORAL DAILY
Status: DISCONTINUED | OUTPATIENT
Start: 2022-01-01 | End: 2022-01-01 | Stop reason: HOSPADM

## 2022-01-01 RX ORDER — PANTOPRAZOLE SODIUM 40 MG/10ML
40 INJECTION, POWDER, LYOPHILIZED, FOR SOLUTION INTRAVENOUS DAILY
Status: DISCONTINUED | OUTPATIENT
Start: 2022-01-01 | End: 2022-01-01

## 2022-01-01 RX ORDER — NIFEDIPINE 30 MG/1
60 TABLET, EXTENDED RELEASE ORAL DAILY
Status: DISCONTINUED | OUTPATIENT
Start: 2022-01-01 | End: 2022-01-01

## 2022-01-01 RX ORDER — PANTOPRAZOLE SODIUM 40 MG/10ML
40 INJECTION, POWDER, LYOPHILIZED, FOR SOLUTION INTRAVENOUS 2 TIMES DAILY
Status: DISCONTINUED | OUTPATIENT
Start: 2022-01-01 | End: 2022-01-01 | Stop reason: HOSPADM

## 2022-01-01 RX ORDER — ACETAMINOPHEN 500 MG
1000 TABLET ORAL ONCE
Status: COMPLETED | OUTPATIENT
Start: 2022-01-01 | End: 2022-01-01

## 2022-01-01 RX ORDER — DIPHENOXYLATE HYDROCHLORIDE AND ATROPINE SULFATE 2.5; .025 MG/1; MG/1
2 TABLET ORAL DAILY PRN
Status: DISCONTINUED | OUTPATIENT
Start: 2022-01-01 | End: 2022-01-01 | Stop reason: HOSPADM

## 2022-01-01 RX ORDER — FENTANYL CITRATE 50 UG/ML
50 INJECTION, SOLUTION INTRAMUSCULAR; INTRAVENOUS ONCE
Status: COMPLETED | OUTPATIENT
Start: 2022-01-01 | End: 2022-01-01

## 2022-01-01 RX ORDER — ONDANSETRON 2 MG/ML
INJECTION INTRAMUSCULAR; INTRAVENOUS PRN
Status: DISCONTINUED | OUTPATIENT
Start: 2022-01-01 | End: 2022-01-01 | Stop reason: SDUPTHER

## 2022-01-01 RX ORDER — OXYCODONE HYDROCHLORIDE 5 MG/1
5 TABLET ORAL
Status: DISCONTINUED | OUTPATIENT
Start: 2022-01-01 | End: 2022-01-01 | Stop reason: HOSPADM

## 2022-01-01 RX ORDER — ACETAMINOPHEN 325 MG/1
650 TABLET ORAL EVERY 6 HOURS PRN
Status: DISCONTINUED | OUTPATIENT
Start: 2022-01-01 | End: 2022-01-01 | Stop reason: SDUPTHER

## 2022-01-01 RX ORDER — TIMOLOL MALEATE 5 MG/ML
1 SOLUTION/ DROPS OPHTHALMIC 2 TIMES DAILY
Status: DISCONTINUED | OUTPATIENT
Start: 2022-01-01 | End: 2022-01-01 | Stop reason: HOSPADM

## 2022-01-01 RX ORDER — LORAZEPAM 2 MG/ML
3 INJECTION INTRAMUSCULAR
Status: DISCONTINUED | OUTPATIENT
Start: 2022-01-01 | End: 2022-01-01

## 2022-01-01 RX ORDER — ACETAMINOPHEN 650 MG/1
650 SUPPOSITORY RECTAL EVERY 6 HOURS PRN
Status: DISCONTINUED | OUTPATIENT
Start: 2022-01-01 | End: 2022-01-01 | Stop reason: HOSPADM

## 2022-01-01 RX ORDER — SODIUM CHLORIDE FOR INHALATION 3 %
4 VIAL, NEBULIZER (ML) INHALATION 4 TIMES DAILY
Status: DISCONTINUED | OUTPATIENT
Start: 2022-01-01 | End: 2022-01-01

## 2022-01-01 RX ORDER — OXYCODONE HYDROCHLORIDE 10 MG/1
10 TABLET ORAL EVERY 4 HOURS PRN
Status: DISCONTINUED | OUTPATIENT
Start: 2022-01-01 | End: 2022-01-01

## 2022-01-01 RX ORDER — PANTOPRAZOLE SODIUM 40 MG/1
40 TABLET, DELAYED RELEASE ORAL
Status: DISCONTINUED | OUTPATIENT
Start: 2022-01-01 | End: 2022-01-01

## 2022-01-01 RX ORDER — BRIMONIDINE TARTRATE, TIMOLOL MALEATE 2; 5 MG/ML; MG/ML
1 SOLUTION/ DROPS OPHTHALMIC 2 TIMES DAILY
Status: DISCONTINUED | OUTPATIENT
Start: 2022-01-01 | End: 2022-01-01 | Stop reason: CLARIF

## 2022-01-01 RX ORDER — LANOLIN ALCOHOL/MO/W.PET/CERES
100 CREAM (GRAM) TOPICAL DAILY
Status: DISCONTINUED | OUTPATIENT
Start: 2022-01-01 | End: 2022-01-01 | Stop reason: HOSPADM

## 2022-01-01 RX ORDER — SODIUM CHLORIDE, SODIUM LACTATE, POTASSIUM CHLORIDE, CALCIUM CHLORIDE 600; 310; 30; 20 MG/100ML; MG/100ML; MG/100ML; MG/100ML
1000 INJECTION, SOLUTION INTRAVENOUS CONTINUOUS
Status: DISCONTINUED | OUTPATIENT
Start: 2022-01-01 | End: 2022-01-01

## 2022-01-01 RX ORDER — NOREPINEPHRINE BIT/0.9 % NACL 16MG/250ML
1-100 INFUSION BOTTLE (ML) INTRAVENOUS CONTINUOUS
Status: DISCONTINUED | OUTPATIENT
Start: 2022-01-01 | End: 2022-01-01 | Stop reason: HOSPADM

## 2022-01-01 RX ORDER — DIPHENHYDRAMINE HYDROCHLORIDE 50 MG/ML
12.5 INJECTION INTRAMUSCULAR; INTRAVENOUS
Status: DISCONTINUED | OUTPATIENT
Start: 2022-01-01 | End: 2022-01-01 | Stop reason: HOSPADM

## 2022-01-01 RX ORDER — ACETAMINOPHEN 325 MG/1
650 TABLET ORAL EVERY 4 HOURS PRN
Status: DISCONTINUED | OUTPATIENT
Start: 2022-01-01 | End: 2022-01-01 | Stop reason: HOSPADM

## 2022-01-01 RX ORDER — OXYCODONE HYDROCHLORIDE 5 MG/1
5 TABLET ORAL EVERY 4 HOURS PRN
Status: DISCONTINUED | OUTPATIENT
Start: 2022-01-01 | End: 2022-01-01

## 2022-01-01 RX ORDER — IPRATROPIUM BROMIDE AND ALBUTEROL SULFATE 2.5; .5 MG/3ML; MG/3ML
1 SOLUTION RESPIRATORY (INHALATION)
Status: DISCONTINUED | OUTPATIENT
Start: 2022-01-01 | End: 2022-01-01 | Stop reason: HOSPADM

## 2022-01-01 RX ADMIN — TRAVOPROST 1 DROP: 0.04 SOLUTION OPHTHALMIC at 22:05

## 2022-01-01 RX ADMIN — DORZOLAMIDE HYDROCHLORIDE 1 DROP: 20 SOLUTION/ DROPS OPHTHALMIC at 20:58

## 2022-01-01 RX ADMIN — ALPRAZOLAM 0.25 MG: 0.25 TABLET ORAL at 10:44

## 2022-01-01 RX ADMIN — VANCOMYCIN HYDROCHLORIDE 1500 MG: 10 INJECTION, POWDER, LYOPHILIZED, FOR SOLUTION INTRAVENOUS at 18:00

## 2022-01-01 RX ADMIN — SODIUM CHLORIDE 20 ML: 9 INJECTION, SOLUTION INTRAVENOUS at 12:50

## 2022-01-01 RX ADMIN — POTASSIUM CHLORIDE 40 MEQ: 1500 TABLET, EXTENDED RELEASE ORAL at 12:47

## 2022-01-01 RX ADMIN — ASPIRIN 325 MG: 325 TABLET, COATED ORAL at 20:38

## 2022-01-01 RX ADMIN — TIOTROPIUM BROMIDE INHALATION SPRAY 1 PUFF: 3.12 SPRAY, METERED RESPIRATORY (INHALATION) at 08:31

## 2022-01-01 RX ADMIN — ROCURONIUM BROMIDE 50 MG: 10 INJECTION INTRAVENOUS at 10:49

## 2022-01-01 RX ADMIN — GUAIFENESIN 600 MG: 600 TABLET, EXTENDED RELEASE ORAL at 20:41

## 2022-01-01 RX ADMIN — DORZOLAMIDE HYDROCHLORIDE 1 DROP: 20 SOLUTION/ DROPS OPHTHALMIC at 08:54

## 2022-01-01 RX ADMIN — BRIMONIDINE TARTRATE 1 DROP: 2 SOLUTION OPHTHALMIC at 22:04

## 2022-01-01 RX ADMIN — TROSPIUM CHLORIDE 20 MG: 20 TABLET, FILM COATED ORAL at 13:05

## 2022-01-01 RX ADMIN — TRAVOPROST 1 DROP: 0.04 SOLUTION OPHTHALMIC at 20:59

## 2022-01-01 RX ADMIN — DORZOLAMIDE HYDROCHLORIDE 1 DROP: 20 SOLUTION/ DROPS OPHTHALMIC at 16:05

## 2022-01-01 RX ADMIN — ONDANSETRON 4 MG: 2 INJECTION INTRAMUSCULAR; INTRAVENOUS at 01:18

## 2022-01-01 RX ADMIN — SODIUM CHLORIDE 2052 ML: 9 INJECTION, SOLUTION INTRAVENOUS at 14:03

## 2022-01-01 RX ADMIN — GUAIFENESIN 600 MG: 600 TABLET, EXTENDED RELEASE ORAL at 20:06

## 2022-01-01 RX ADMIN — SODIUM CHLORIDE, POTASSIUM CHLORIDE, SODIUM LACTATE AND CALCIUM CHLORIDE: 600; 310; 30; 20 INJECTION, SOLUTION INTRAVENOUS at 09:02

## 2022-01-01 RX ADMIN — DORZOLAMIDE HYDROCHLORIDE 1 DROP: 20 SOLUTION/ DROPS OPHTHALMIC at 22:05

## 2022-01-01 RX ADMIN — Medication 100 MG: at 10:27

## 2022-01-01 RX ADMIN — SODIUM CHLORIDE 1000 ML: 9 INJECTION, SOLUTION INTRAVENOUS at 08:38

## 2022-01-01 RX ADMIN — NIFEDIPINE 60 MG: 30 TABLET, FILM COATED, EXTENDED RELEASE ORAL at 08:40

## 2022-01-01 RX ADMIN — ATORVASTATIN CALCIUM 40 MG: 40 TABLET, FILM COATED ORAL at 08:37

## 2022-01-01 RX ADMIN — IOPAMIDOL 75 ML: 755 INJECTION, SOLUTION INTRAVENOUS at 18:42

## 2022-01-01 RX ADMIN — LORAZEPAM 1 MG: 2 INJECTION INTRAMUSCULAR; INTRAVENOUS at 08:48

## 2022-01-01 RX ADMIN — ONDANSETRON 4 MG: 2 INJECTION INTRAMUSCULAR; INTRAVENOUS at 17:44

## 2022-01-01 RX ADMIN — GUAIFENESIN 600 MG: 600 TABLET, EXTENDED RELEASE ORAL at 20:48

## 2022-01-01 RX ADMIN — METOPROLOL TARTRATE 50 MG: 50 TABLET, FILM COATED ORAL at 08:52

## 2022-01-01 RX ADMIN — ONDANSETRON 4 MG: 2 INJECTION INTRAMUSCULAR; INTRAVENOUS at 18:25

## 2022-01-01 RX ADMIN — GUAIFENESIN 600 MG: 600 TABLET, EXTENDED RELEASE ORAL at 14:02

## 2022-01-01 RX ADMIN — OXYCODONE HYDROCHLORIDE 10 MG: 10 TABLET ORAL at 15:33

## 2022-01-01 RX ADMIN — SODIUM CHLORIDE, PRESERVATIVE FREE 10 ML: 5 INJECTION INTRAVENOUS at 08:42

## 2022-01-01 RX ADMIN — PROPOFOL 40 MG: 10 INJECTION, EMULSION INTRAVENOUS at 10:49

## 2022-01-01 RX ADMIN — SODIUM CHLORIDE, POTASSIUM CHLORIDE, SODIUM LACTATE AND CALCIUM CHLORIDE 1000 ML: 600; 310; 30; 20 INJECTION, SOLUTION INTRAVENOUS at 11:02

## 2022-01-01 RX ADMIN — CEFAZOLIN 2000 MG: 2 INJECTION, POWDER, FOR SOLUTION INTRAMUSCULAR; INTRAVENOUS at 14:14

## 2022-01-01 RX ADMIN — ASPIRIN 325 MG: 325 TABLET, COATED ORAL at 20:50

## 2022-01-01 RX ADMIN — LIDOCAINE HYDROCHLORIDE 50 MG: 20 INJECTION, SOLUTION INTRAVENOUS at 10:49

## 2022-01-01 RX ADMIN — METOPROLOL TARTRATE 50 MG: 50 TABLET, FILM COATED ORAL at 20:38

## 2022-01-01 RX ADMIN — Medication 100 MG: at 08:52

## 2022-01-01 RX ADMIN — ATORVASTATIN CALCIUM 40 MG: 40 TABLET, FILM COATED ORAL at 14:02

## 2022-01-01 RX ADMIN — SODIUM CHLORIDE, PRESERVATIVE FREE 10 ML: 5 INJECTION INTRAVENOUS at 20:38

## 2022-01-01 RX ADMIN — ASPIRIN 81 MG: 81 TABLET, COATED ORAL at 08:52

## 2022-01-01 RX ADMIN — OXYCODONE HYDROCHLORIDE 10 MG: 10 TABLET ORAL at 11:17

## 2022-01-01 RX ADMIN — CLOPIDOGREL BISULFATE 75 MG: 75 TABLET ORAL at 08:52

## 2022-01-01 RX ADMIN — ASPIRIN 81 MG: 81 TABLET, COATED ORAL at 14:02

## 2022-01-01 RX ADMIN — SODIUM CHLORIDE, PRESERVATIVE FREE 10 ML: 5 INJECTION INTRAVENOUS at 14:03

## 2022-01-01 RX ADMIN — TIOTROPIUM BROMIDE INHALATION SPRAY 1 PUFF: 3.12 SPRAY, METERED RESPIRATORY (INHALATION) at 08:24

## 2022-01-01 RX ADMIN — Medication 100 MG: at 14:02

## 2022-01-01 RX ADMIN — OXYCODONE HYDROCHLORIDE 10 MG: 10 TABLET ORAL at 10:43

## 2022-01-01 RX ADMIN — DEXAMETHASONE SODIUM PHOSPHATE 4 MG: 4 INJECTION, SOLUTION INTRAMUSCULAR; INTRAVENOUS at 10:51

## 2022-01-01 RX ADMIN — Medication 6 MG: at 20:50

## 2022-01-01 RX ADMIN — ALPRAZOLAM 0.25 MG: 0.25 TABLET ORAL at 20:55

## 2022-01-01 RX ADMIN — METOPROLOL TARTRATE 50 MG: 50 TABLET, FILM COATED ORAL at 22:02

## 2022-01-01 RX ADMIN — OXYCODONE HYDROCHLORIDE 5 MG: 5 TABLET ORAL at 22:14

## 2022-01-01 RX ADMIN — GUAIFENESIN 600 MG: 600 TABLET, EXTENDED RELEASE ORAL at 20:50

## 2022-01-01 RX ADMIN — FENTANYL CITRATE 50 MCG: 50 INJECTION, SOLUTION INTRAMUSCULAR; INTRAVENOUS at 17:44

## 2022-01-01 RX ADMIN — GUAIFENESIN 600 MG: 600 TABLET, EXTENDED RELEASE ORAL at 08:40

## 2022-01-01 RX ADMIN — SUGAMMADEX 100 MG: 100 INJECTION, SOLUTION INTRAVENOUS at 11:59

## 2022-01-01 RX ADMIN — VANCOMYCIN HYDROCHLORIDE 1000 MG: 1 INJECTION, POWDER, LYOPHILIZED, FOR SOLUTION INTRAVENOUS at 06:12

## 2022-01-01 RX ADMIN — ASPIRIN 325 MG: 325 TABLET, COATED ORAL at 08:40

## 2022-01-01 RX ADMIN — METOPROLOL TARTRATE 50 MG: 50 TABLET, FILM COATED ORAL at 00:00

## 2022-01-01 RX ADMIN — SODIUM CHLORIDE, PRESERVATIVE FREE 10 ML: 5 INJECTION INTRAVENOUS at 22:02

## 2022-01-01 RX ADMIN — Medication 6 MG: at 20:46

## 2022-01-01 RX ADMIN — GUAIFENESIN 600 MG: 600 TABLET, EXTENDED RELEASE ORAL at 08:36

## 2022-01-01 RX ADMIN — FENTANYL CITRATE 50 MCG: 50 INJECTION, SOLUTION INTRAMUSCULAR; INTRAVENOUS at 10:49

## 2022-01-01 RX ADMIN — OXYCODONE HYDROCHLORIDE 10 MG: 10 TABLET ORAL at 01:08

## 2022-01-01 RX ADMIN — FOLIC ACID 1 MG: 1 TABLET ORAL at 08:40

## 2022-01-01 RX ADMIN — CEFAZOLIN 2000 MG: 2 INJECTION, POWDER, FOR SOLUTION INTRAMUSCULAR; INTRAVENOUS at 01:47

## 2022-01-01 RX ADMIN — LOSARTAN POTASSIUM 25 MG: 25 TABLET, FILM COATED ORAL at 12:47

## 2022-01-01 RX ADMIN — Medication 100 MG: at 08:37

## 2022-01-01 RX ADMIN — PANTOPRAZOLE SODIUM 40 MG: 40 TABLET, DELAYED RELEASE ORAL at 05:00

## 2022-01-01 RX ADMIN — TIOTROPIUM BROMIDE INHALATION SPRAY 1 PUFF: 3.12 SPRAY, METERED RESPIRATORY (INHALATION) at 09:08

## 2022-01-01 RX ADMIN — OXYCODONE HYDROCHLORIDE 10 MG: 10 TABLET ORAL at 11:57

## 2022-01-01 RX ADMIN — METOPROLOL TARTRATE 50 MG: 50 TABLET, FILM COATED ORAL at 10:26

## 2022-01-01 RX ADMIN — Medication 400 MG: at 16:58

## 2022-01-01 RX ADMIN — CEFAZOLIN 2000 MG: 2 INJECTION, POWDER, FOR SOLUTION INTRAMUSCULAR; INTRAVENOUS at 10:36

## 2022-01-01 RX ADMIN — PANTOPRAZOLE SODIUM 40 MG: 40 TABLET, DELAYED RELEASE ORAL at 06:49

## 2022-01-01 RX ADMIN — METOPROLOL TARTRATE 50 MG: 50 TABLET, FILM COATED ORAL at 14:02

## 2022-01-01 RX ADMIN — MAGNESIUM SULFATE HEPTAHYDRATE 2000 MG: 2 INJECTION, SOLUTION INTRAVENOUS at 12:51

## 2022-01-01 RX ADMIN — CEFEPIME HYDROCHLORIDE 2000 MG: 2 INJECTION, POWDER, FOR SOLUTION INTRAVENOUS at 07:28

## 2022-01-01 RX ADMIN — TIMOLOL MALEATE 1 DROP: 5 SOLUTION OPHTHALMIC at 22:05

## 2022-01-01 RX ADMIN — SODIUM CHLORIDE, POTASSIUM CHLORIDE, SODIUM LACTATE AND CALCIUM CHLORIDE: 600; 310; 30; 20 INJECTION, SOLUTION INTRAVENOUS at 13:05

## 2022-01-01 RX ADMIN — CEFEPIME HYDROCHLORIDE 2000 MG: 2 INJECTION, POWDER, FOR SOLUTION INTRAVENOUS at 20:47

## 2022-01-01 RX ADMIN — ENOXAPARIN SODIUM 40 MG: 40 INJECTION SUBCUTANEOUS at 14:02

## 2022-01-01 RX ADMIN — PANTOPRAZOLE SODIUM 40 MG: 40 TABLET, DELAYED RELEASE ORAL at 08:52

## 2022-01-01 RX ADMIN — GUAIFENESIN 600 MG: 600 TABLET, EXTENDED RELEASE ORAL at 08:52

## 2022-01-01 RX ADMIN — GUAIFENESIN 600 MG: 600 TABLET, EXTENDED RELEASE ORAL at 22:03

## 2022-01-01 RX ADMIN — METOPROLOL TARTRATE 50 MG: 50 TABLET, FILM COATED ORAL at 20:50

## 2022-01-01 RX ADMIN — SUGAMMADEX 100 MG: 100 INJECTION, SOLUTION INTRAVENOUS at 12:03

## 2022-01-01 RX ADMIN — FOLIC ACID 1 MG: 1 TABLET ORAL at 14:02

## 2022-01-01 RX ADMIN — GUAIFENESIN 600 MG: 600 TABLET, EXTENDED RELEASE ORAL at 10:26

## 2022-01-01 RX ADMIN — TIMOLOL MALEATE 1 DROP: 5 SOLUTION OPHTHALMIC at 20:55

## 2022-01-01 RX ADMIN — ACETAMINOPHEN 1000 MG: 500 TABLET ORAL at 09:01

## 2022-01-01 RX ADMIN — TIOTROPIUM BROMIDE INHALATION SPRAY 1 PUFF: 3.12 SPRAY, METERED RESPIRATORY (INHALATION) at 08:51

## 2022-01-01 RX ADMIN — OXYCODONE HYDROCHLORIDE 10 MG: 10 TABLET ORAL at 15:43

## 2022-01-01 RX ADMIN — Medication 100 MG: at 14:04

## 2022-01-01 RX ADMIN — ENOXAPARIN SODIUM 40 MG: 100 INJECTION SUBCUTANEOUS at 20:49

## 2022-01-01 RX ADMIN — CEFEPIME 2000 MG: 2 INJECTION, POWDER, FOR SOLUTION INTRAVENOUS at 17:53

## 2022-01-01 RX ADMIN — SODIUM CHLORIDE, POTASSIUM CHLORIDE, SODIUM LACTATE AND CALCIUM CHLORIDE 1000 ML: 600; 310; 30; 20 INJECTION, SOLUTION INTRAVENOUS at 23:57

## 2022-01-01 RX ADMIN — MAGNESIUM SULFATE HEPTAHYDRATE 2000 MG: 2 INJECTION, SOLUTION INTRAVENOUS at 17:00

## 2022-01-01 RX ADMIN — FOLIC ACID 1 MG: 1 TABLET ORAL at 08:37

## 2022-01-01 RX ADMIN — OXYCODONE HYDROCHLORIDE 10 MG: 10 TABLET ORAL at 20:46

## 2022-01-01 RX ADMIN — ONDANSETRON 4 MG: 2 INJECTION INTRAMUSCULAR; INTRAVENOUS at 10:51

## 2022-01-01 RX ADMIN — NIFEDIPINE 60 MG: 30 TABLET, FILM COATED, EXTENDED RELEASE ORAL at 08:37

## 2022-01-01 RX ADMIN — SODIUM CHLORIDE, PRESERVATIVE FREE 10 ML: 5 INJECTION INTRAVENOUS at 20:51

## 2022-01-01 RX ADMIN — METOPROLOL TARTRATE 50 MG: 50 TABLET, FILM COATED ORAL at 20:06

## 2022-01-01 RX ADMIN — ENOXAPARIN SODIUM 40 MG: 100 INJECTION SUBCUTANEOUS at 22:02

## 2022-01-01 RX ADMIN — Medication 6 MG: at 22:03

## 2022-01-01 RX ADMIN — METOPROLOL TARTRATE 50 MG: 50 TABLET, FILM COATED ORAL at 20:46

## 2022-01-01 RX ADMIN — PANTOPRAZOLE SODIUM 40 MG: 40 TABLET, DELAYED RELEASE ORAL at 06:16

## 2022-01-01 RX ADMIN — PANTOPRAZOLE SODIUM 40 MG: 40 TABLET, DELAYED RELEASE ORAL at 07:05

## 2022-01-01 RX ADMIN — METOPROLOL TARTRATE 50 MG: 50 TABLET, FILM COATED ORAL at 08:41

## 2022-01-01 RX ADMIN — SODIUM CHLORIDE, POTASSIUM CHLORIDE, SODIUM LACTATE AND CALCIUM CHLORIDE 1000 ML: 600; 310; 30; 20 INJECTION, SOLUTION INTRAVENOUS at 06:10

## 2022-01-01 RX ADMIN — VANCOMYCIN HYDROCHLORIDE 1000 MG: 1 INJECTION, POWDER, LYOPHILIZED, FOR SOLUTION INTRAVENOUS at 06:00

## 2022-01-01 RX ADMIN — ATORVASTATIN CALCIUM 40 MG: 40 TABLET, FILM COATED ORAL at 08:52

## 2022-01-01 RX ADMIN — ASPIRIN 325 MG: 325 TABLET, COATED ORAL at 20:06

## 2022-01-01 RX ADMIN — BRIMONIDINE TARTRATE 1 DROP: 2 SOLUTION OPHTHALMIC at 20:55

## 2022-01-01 RX ADMIN — LORAZEPAM 2 MG: 2 INJECTION INTRAMUSCULAR; INTRAVENOUS at 01:05

## 2022-01-01 RX ADMIN — ASPIRIN 325 MG: 325 TABLET, COATED ORAL at 08:36

## 2022-01-01 RX ADMIN — SODIUM CHLORIDE, PRESERVATIVE FREE 10 ML: 5 INJECTION INTRAVENOUS at 20:43

## 2022-01-01 RX ADMIN — SODIUM CHLORIDE, PRESERVATIVE FREE 10 ML: 5 INJECTION INTRAVENOUS at 21:01

## 2022-01-01 RX ADMIN — Medication 100 MG: at 08:40

## 2022-01-01 RX ADMIN — SODIUM CHLORIDE, PRESERVATIVE FREE 10 ML: 5 INJECTION INTRAVENOUS at 10:27

## 2022-01-01 RX ADMIN — CEFEPIME HYDROCHLORIDE 2000 MG: 2 INJECTION, POWDER, FOR SOLUTION INTRAVENOUS at 06:08

## 2022-01-01 RX ADMIN — VANCOMYCIN HYDROCHLORIDE 1000 MG: 1 INJECTION, POWDER, LYOPHILIZED, FOR SOLUTION INTRAVENOUS at 18:30

## 2022-01-01 RX ADMIN — METOPROLOL TARTRATE 50 MG: 50 TABLET, FILM COATED ORAL at 08:37

## 2022-01-01 RX ADMIN — ATORVASTATIN CALCIUM 40 MG: 40 TABLET, FILM COATED ORAL at 08:40

## 2022-01-01 RX ADMIN — Medication 6 MG: at 20:06

## 2022-01-01 RX ADMIN — ENOXAPARIN SODIUM 40 MG: 40 INJECTION SUBCUTANEOUS at 10:27

## 2022-01-01 RX ADMIN — ATORVASTATIN CALCIUM 40 MG: 40 TABLET, FILM COATED ORAL at 10:26

## 2022-01-01 RX ADMIN — OXYCODONE HYDROCHLORIDE 5 MG: 5 TABLET ORAL at 20:38

## 2022-01-01 RX ADMIN — NIFEDIPINE 60 MG: 30 TABLET, FILM COATED, EXTENDED RELEASE ORAL at 18:30

## 2022-01-01 RX ADMIN — BRIMONIDINE TARTRATE 1 DROP: 2 SOLUTION OPHTHALMIC at 08:53

## 2022-01-01 RX ADMIN — CLOPIDOGREL BISULFATE 75 MG: 75 TABLET ORAL at 12:47

## 2022-01-01 RX ADMIN — DORZOLAMIDE HYDROCHLORIDE 1 DROP: 20 SOLUTION/ DROPS OPHTHALMIC at 14:47

## 2022-01-01 RX ADMIN — LOSARTAN POTASSIUM 25 MG: 25 TABLET, FILM COATED ORAL at 08:52

## 2022-01-01 RX ADMIN — OXYCODONE HYDROCHLORIDE 10 MG: 10 TABLET ORAL at 21:06

## 2022-01-01 RX ADMIN — FOLIC ACID 1 MG: 1 TABLET ORAL at 10:27

## 2022-01-01 RX ADMIN — OXYCODONE HYDROCHLORIDE 5 MG: 5 TABLET ORAL at 08:42

## 2022-01-01 RX ADMIN — Medication 400 MG: at 08:40

## 2022-01-01 RX ADMIN — SODIUM CHLORIDE, PRESERVATIVE FREE 10 ML: 5 INJECTION INTRAVENOUS at 20:05

## 2022-01-01 RX ADMIN — TIMOLOL MALEATE 1 DROP: 5 SOLUTION OPHTHALMIC at 08:53

## 2022-01-01 RX ADMIN — FENTANYL CITRATE 50 MCG: 50 INJECTION, SOLUTION INTRAMUSCULAR; INTRAVENOUS at 11:15

## 2022-01-01 RX ADMIN — ALPRAZOLAM 0.25 MG: 0.25 TABLET ORAL at 11:51

## 2022-01-01 RX ADMIN — HYDROMORPHONE HYDROCHLORIDE 0.25 MG: 1 INJECTION, SOLUTION INTRAMUSCULAR; INTRAVENOUS; SUBCUTANEOUS at 14:32

## 2022-01-01 RX ADMIN — FOLIC ACID 1 MG: 1 TABLET ORAL at 08:52

## 2022-01-01 RX ADMIN — ALBUMIN (HUMAN) 25 G: 12.5 SOLUTION INTRAVENOUS at 11:00

## 2022-01-01 ASSESSMENT — ENCOUNTER SYMPTOMS
DIARRHEA: 0
COUGH: 0
COUGH: 0
WHEEZING: 0
EYE REDNESS: 0
COLOR CHANGE: 0
SINUS PRESSURE: 0
ABDOMINAL PAIN: 0
BACK PAIN: 0
SORE THROAT: 0
BACK PAIN: 0
SHORTNESS OF BREATH: 0
EYE PAIN: 0
VOMITING: 0
EYE REDNESS: 0
EYE ITCHING: 0
BACK PAIN: 0
CONSTIPATION: 0
NAUSEA: 0
WHEEZING: 0
VOMITING: 0
CHEST TIGHTNESS: 0
SORE THROAT: 0
SINUS PAIN: 0
NAUSEA: 0
CHEST TIGHTNESS: 0
ABDOMINAL PAIN: 0
SHORTNESS OF BREATH: 0

## 2022-01-01 ASSESSMENT — PAIN DESCRIPTION - LOCATION
LOCATION: LEG
LOCATION: GROIN
LOCATION: HIP
LOCATION: HIP;BACK
LOCATION: HIP
LOCATION: HIP
LOCATION: LEG;HIP
LOCATION: HIP
LOCATION: HIP;BACK
LOCATION: LEG

## 2022-01-01 ASSESSMENT — PAIN DESCRIPTION - DESCRIPTORS
DESCRIPTORS: SORE;DISCOMFORT
DESCRIPTORS: ACHING
DESCRIPTORS: SPASM;BURNING
DESCRIPTORS: ACHING
DESCRIPTORS: ACHING
DESCRIPTORS: DISCOMFORT;SORE
DESCRIPTORS: ACHING
DESCRIPTORS: ACHING
DESCRIPTORS: DULL
DESCRIPTORS: SORE

## 2022-01-01 ASSESSMENT — PAIN DESCRIPTION - ORIENTATION
ORIENTATION: LEFT

## 2022-01-01 ASSESSMENT — PAIN DESCRIPTION - PAIN TYPE
TYPE: SURGICAL PAIN
TYPE: ACUTE PAIN

## 2022-01-01 ASSESSMENT — PAIN SCALES - GENERAL
PAINLEVEL_OUTOF10: 0
PAINLEVEL_OUTOF10: 4
PAINLEVEL_OUTOF10: 4
PAINLEVEL_OUTOF10: 0
PAINLEVEL_OUTOF10: 8
PAINLEVEL_OUTOF10: 7
PAINLEVEL_OUTOF10: 10
PAINLEVEL_OUTOF10: 0
PAINLEVEL_OUTOF10: 0
PAINLEVEL_OUTOF10: 4
PAINLEVEL_OUTOF10: 0
PAINLEVEL_OUTOF10: 5
PAINLEVEL_OUTOF10: 7
PAINLEVEL_OUTOF10: 7
PAINLEVEL_OUTOF10: 0
PAINLEVEL_OUTOF10: 2
PAINLEVEL_OUTOF10: 7
PAINLEVEL_OUTOF10: 0
PAINLEVEL_OUTOF10: 4
PAINLEVEL_OUTOF10: 5
PAINLEVEL_OUTOF10: 0
PAINLEVEL_OUTOF10: 8
PAINLEVEL_OUTOF10: 7

## 2022-01-01 ASSESSMENT — PAIN DESCRIPTION - FREQUENCY
FREQUENCY: INTERMITTENT

## 2022-01-01 ASSESSMENT — PAIN - FUNCTIONAL ASSESSMENT
PAIN_FUNCTIONAL_ASSESSMENT: PREVENTS OR INTERFERES SOME ACTIVE ACTIVITIES AND ADLS
PAIN_FUNCTIONAL_ASSESSMENT: 0-10
PAIN_FUNCTIONAL_ASSESSMENT: 0-10
PAIN_FUNCTIONAL_ASSESSMENT: PREVENTS OR INTERFERES WITH MANY ACTIVE NOT PASSIVE ACTIVITIES
PAIN_FUNCTIONAL_ASSESSMENT: PREVENTS OR INTERFERES WITH MANY ACTIVE NOT PASSIVE ACTIVITIES
PAIN_FUNCTIONAL_ASSESSMENT: ACTIVITIES ARE NOT PREVENTED
PAIN_FUNCTIONAL_ASSESSMENT: ACTIVITIES ARE NOT PREVENTED
PAIN_FUNCTIONAL_ASSESSMENT: PREVENTS OR INTERFERES SOME ACTIVE ACTIVITIES AND ADLS
PAIN_FUNCTIONAL_ASSESSMENT: PREVENTS OR INTERFERES WITH MANY ACTIVE NOT PASSIVE ACTIVITIES

## 2022-01-01 ASSESSMENT — PULMONARY FUNCTION TESTS: PIF_VALUE: 37

## 2022-01-01 ASSESSMENT — LIFESTYLE VARIABLES: SMOKING_STATUS: 1

## 2022-09-29 PROBLEM — A41.9 SEVERE SEPSIS (HCC): Status: ACTIVE | Noted: 2022-01-01

## 2022-09-29 PROBLEM — R65.20 SEVERE SEPSIS (HCC): Status: ACTIVE | Noted: 2022-01-01

## 2022-09-29 NOTE — H&P
few days. Denies any chills. Reports he has not had much of an appetite and he drinks 3-4 beers a day. Denies any cough, shortness of breath, nausea, vomiting, weakness, dizziness. Denies any dysuria     Review of Systems: Need 10 Elements   Review of Systems   Constitutional:  Positive for fever. Negative for chills. HENT:  Negative for sore throat. Eyes:  Negative for visual disturbance. Respiratory:  Negative for cough, shortness of breath and wheezing. Cardiovascular:  Negative for palpitations and leg swelling. Gastrointestinal:  Negative for nausea and vomiting. Endocrine: Negative for polyuria. Genitourinary:  Negative for dysuria. Musculoskeletal:  Negative for back pain. Skin:  Negative for wound. Neurological:  Negative for dizziness and weakness. Psychiatric/Behavioral:  Negative for confusion. Objective:   No intake or output data in the 24 hours ending 09/29/22 1744   Vitals:   Vitals:    09/29/22 1319 09/29/22 1324 09/29/22 1400   BP: (!) 154/91  (!) 166/76   Pulse: (!) 103  (!) 105   Resp: 18     Temp: 98.3 °F (36.8 °C)     TempSrc: Oral     SpO2: 95%     Weight:  132 lb (59.9 kg)    Height:  5' 8\" (1.727 m)        Medications Prior to Admission     Prior to Admission medications    Medication Sig Start Date End Date Taking?  Authorizing Provider   aspirin EC 81 MG EC tablet Take 1 tablet by mouth daily Resume after 2 weeks (02/03/2020) 1/20/20   Jorje Hancock MD   folic acid (FOLVITE) 1 MG tablet Take 1 tablet by mouth daily 1/21/20   Jorje Hancock MD   vitamin B-1 100 MG tablet Take 1 tablet by mouth daily 1/20/20   Jorje Hancock MD   pantoprazole (PROTONIX) 40 MG tablet Take 1 tablet by mouth every morning (before breakfast) 1/20/20   Jorje Hancock MD   ferrous sulfate (FE TABS) 325 (65 Fe) MG EC tablet Take 1 tablet by mouth 2 times daily 1/20/20   Jorje Hancock MD   brimonidine-timolol (COMBIGAN) 0.2-0.5 % ophthalmic solution Place 1 drop into both eyes every 12 hours    Historical Provider, MD   tiotropium (SPIRIVA RESPIMAT) 2.5 MCG/ACT AERS inhaler Inhale 1 puff into the lungs daily    Historical Provider, MD   Nutritional Supplements (JUICE PLUS FIBRE PO) Take 2 each by mouth 2 times daily    Historical Provider, MD   metoprolol tartrate (LOPRESSOR) 50 MG tablet Take 1 tablet by mouth 2 times daily 9/14/16   Chloé Colby MD   nicotine (NICODERM CQ) 14 MG/24HR Place 1 patch onto the skin daily 9/14/16   Chloé Colby MD   travoprost, benzalkonium, (TRAVATAN) 0.004 % ophthalmic solution Place 1 drop into the left eye nightly     Historical Provider, MD   brinzolamide (AZOPT) 1 % ophthalmic suspension Place 1 drop into the left eye 3 times daily     Historical Provider, MD   ALPRAZolam (XANAX) 0.25 MG tablet Take 0.25 mg by mouth every 6 hours as needed for Sleep or Anxiety    Historical Provider, MD   atorvastatin (LIPITOR) 40 MG tablet Take 40 mg by mouth daily    Historical Provider, MD   fenofibrate (TRICOR) 145 MG tablet Take 145 mg by mouth daily    Historical Provider, MD   diphenoxylate-atropine (LOMOTIL) 2.5-0.025 MG per tablet Take 2 tablets by mouth daily as needed for Diarrhea    Historical Provider, MD   losartan (COZAAR) 25 MG tablet Take 25 mg by mouth daily    Historical Provider, MD   NIFEdipine (PROCARDIA XL) 60 MG extended release tablet Take 60 mg by mouth daily    Historical Provider, MD       Physical Exam: Need 8 Elements   Physical Exam  Constitutional:       General: He is not in acute distress. HENT:      Mouth/Throat:      Mouth: Mucous membranes are moist.      Pharynx: No posterior oropharyngeal erythema. Eyes:      General: No scleral icterus. Extraocular Movements: Extraocular movements intact. Pupils: Pupils are equal, round, and reactive to light. Cardiovascular:      Rate and Rhythm: Regular rhythm. Tachycardia present. Pulses: Normal pulses.       Heart sounds: No murmur cefepime  2,000 mg IntraVENous Once    vancomycin  1,500 mg IntraVENous Once    ondansetron  4 mg IntraVENous Once    fentanNYL  50 mcg IntraVENous Once      Infusions:    sodium chloride       PRN Meds: sodium chloride flush, 5-40 mL, PRN  sodium chloride, , PRN  LORazepam, 1 mg, Q1H PRN   Or  LORazepam, 1 mg, Q1H PRN   Or  LORazepam, 2 mg, Q1H PRN   Or  LORazepam, 2 mg, Q1H PRN   Or  LORazepam, 3 mg, Q1H PRN   Or  LORazepam, 3 mg, Q1H PRN   Or  LORazepam, 4 mg, Q1H PRN   Or  LORazepam, 4 mg, Q1H PRN        Labs      CBC:   Recent Labs     09/29/22  1328   WBC 16.5*   HGB 12.6*   *     BMP:    Recent Labs     09/29/22  1328   *   K 4.3   CL 98*   CO2 23   BUN 15   CREATININE 0.5*   GLUCOSE 136*     Hepatic:   Recent Labs     09/29/22  1328   AST 22   ALT 15   BILITOT 0.5   ALKPHOS 60     Lipids:   Lab Results   Component Value Date/Time    CHOL 158 09/13/2016 05:15 AM    HDL 48 09/13/2016 05:15 AM    TRIG 246 09/13/2016 05:15 AM     Hemoglobin A1C: No results found for: LABA1C  TSH: No results found for: TSH  Troponin:   Lab Results   Component Value Date/Time    TROPONINT <0.010 09/29/2022 01:28 PM    TROPONINT <0.010 03/23/2021 04:22 AM    TROPONINT 1.160 09/13/2016 05:15 AM     Lactic Acid: No results for input(s): LACTA in the last 72 hours. BNP: No results for input(s): PROBNP in the last 72 hours.   UA:  Lab Results   Component Value Date/Time    NITRU NEGATIVE 09/29/2022 03:20 PM    COLORU YELLOW 09/29/2022 03:20 PM    WBCUA <1 09/29/2022 03:20 PM    RBCUA NONE SEEN 09/29/2022 03:20 PM    MUCUS FEW 09/29/2022 03:20 PM    TRICHOMONAS NONE SEEN 09/29/2022 03:20 PM    BACTERIA NEGATIVE 09/29/2022 03:20 PM    CLARITYU CLEAR 09/29/2022 03:20 PM    SPECGRAV 1.025 09/29/2022 03:20 PM    LEUKOCYTESUR NEGATIVE 09/29/2022 03:20 PM    UROBILINOGEN 0.2 09/29/2022 03:20 PM    BILIRUBINUR NEGATIVE 09/29/2022 03:20 PM    BLOODU NEGATIVE 09/29/2022 03:20 PM    KETUA TRACE 09/29/2022 03:20 PM     Urine Cultures: No results found for: LABURIN  Blood Cultures: No results found for: BC  No results found for: BLOODCULT2  Organism: No results found for: ORG    Imaging/Diagnostics Last 24 Hours   CT HEAD WO CONTRAST    Result Date: 9/29/2022  EXAMINATION: CT OF THE HEAD WITHOUT CONTRAST; CT OF THE CERVICAL SPINE WITHOUT CONTRAST 9/29/2022 2:42 pm; 9/29/2022 2:43 pm TECHNIQUE: CT of the head was performed without the administration of intravenous contrast. Automated exposure control, iterative reconstruction, and/or weight based adjustment of the mA/kV was utilized to reduce the radiation dose to as low as reasonably achievable.; CT of the cervical spine was performed without the administration of intravenous contrast. Multiplanar reformatted images are provided for review. Automated exposure control, iterative reconstruction, and/or weight based adjustment of the mA/kV was utilized to reduce the radiation dose to as low as reasonably achievable. COMPARISON: 03/23/2021 HISTORY: ORDERING SYSTEM PROVIDED HISTORY: possible AMS, unknown fall TECHNOLOGIST PROVIDED HISTORY: Reason for exam:->possible AMS, unknown fall Has a \"code stroke\" or \"stroke alert\" been called? ->No Decision Support Exception - unselect if not a suspected or confirmed emergency medical condition->Emergency Medical Condition (MA) Reason for Exam: possible AMS, unknown fall Additional signs and symptoms: none Relevant Medical/Surgical History: none; ORDERING SYSTEM PROVIDED HISTORY: possible fall, possible ams TECHNOLOGIST PROVIDED HISTORY: Reason for exam:->possible fall, possible ams Decision Support Exception - unselect if not a suspected or confirmed emergency medical condition->Emergency Medical Condition (MA) Reason for Exam: possible fall, possible ams Additional signs and symptoms: none Relevant Medical/Surgical History: none FINDINGS: BRAIN/VENTRICLES: There is no acute intracranial hemorrhage, mass effect or midline shift.   No abnormal extra-axial fluid collection. The gray-white differentiation is maintained without evidence of an acute infarct. There remains stable disproportionate ventricular dilatation in relation to cerebral atrophy, with progressive periventricular white matter hypoattenuation, which may suggest normal pressure hydrocephalus with associated transependymal CSF flow. There is stable chronic small vessel ischemic white matter disease. No focus of acute abnormal brain attenuation is identified. ORBITS: The visualized portion of the orbits demonstrate no acute abnormality. SINUSES: The visualized paranasal sinuses and mastoid air cells demonstrate no acute abnormality. SOFT TISSUES/SKULL:  No acute abnormality of the visualized skull or soft tissues. CERVICAL BONES/ALIGNMENT: There is no acute fracture or subluxation. There is a stable mild 2 mm degenerative retrolisthesis of C3. No additional abnormal listhesis is identified. The vertebral bodies are otherwise normal in height and alignment. The posterior elements are intact and aligned. No destructive osseous lesion is seen. CERVICAL DEGENERATIVE CHANGES: There is stable mild multilevel spondylosis, most notable at C4 through C6. There is stable mild disc space loss and endplate sclerosis at G0-X0, C4-C5, and C5-C6. There are mild disc osteophyte protrusions at C3-C4, C4-C5, and C5-C6, without significant central canal stenosis. However, the combination of multilevel bilateral uncovertebral and facet hypertrophy leads to varying degrees of multilevel bilateral neuroforaminal stenosis. CERVICAL SOFT TISSUES: The cervical soft tissues are unremarkable. The lung apices are clear. 1. No acute intracranial abnormality. 2. Findings suggestive of normal pressure hydrocephalus. Clinical correlation is advised. 3. No acute fracture or subluxation of the cervical spine.      CT CERVICAL SPINE WO CONTRAST    Result Date: 9/29/2022  EXAMINATION: CT OF THE HEAD WITHOUT CONTRAST; CT OF THE CERVICAL SPINE WITHOUT CONTRAST 9/29/2022 2:42 pm; 9/29/2022 2:43 pm TECHNIQUE: CT of the head was performed without the administration of intravenous contrast. Automated exposure control, iterative reconstruction, and/or weight based adjustment of the mA/kV was utilized to reduce the radiation dose to as low as reasonably achievable.; CT of the cervical spine was performed without the administration of intravenous contrast. Multiplanar reformatted images are provided for review. Automated exposure control, iterative reconstruction, and/or weight based adjustment of the mA/kV was utilized to reduce the radiation dose to as low as reasonably achievable. COMPARISON: 03/23/2021 HISTORY: ORDERING SYSTEM PROVIDED HISTORY: possible AMS, unknown fall TECHNOLOGIST PROVIDED HISTORY: Reason for exam:->possible AMS, unknown fall Has a \"code stroke\" or \"stroke alert\" been called? ->No Decision Support Exception - unselect if not a suspected or confirmed emergency medical condition->Emergency Medical Condition (MA) Reason for Exam: possible AMS, unknown fall Additional signs and symptoms: none Relevant Medical/Surgical History: none; ORDERING SYSTEM PROVIDED HISTORY: possible fall, possible ams TECHNOLOGIST PROVIDED HISTORY: Reason for exam:->possible fall, possible ams Decision Support Exception - unselect if not a suspected or confirmed emergency medical condition->Emergency Medical Condition (MA) Reason for Exam: possible fall, possible ams Additional signs and symptoms: none Relevant Medical/Surgical History: none FINDINGS: BRAIN/VENTRICLES: There is no acute intracranial hemorrhage, mass effect or midline shift. No abnormal extra-axial fluid collection. The gray-white differentiation is maintained without evidence of an acute infarct.   There remains stable disproportionate ventricular dilatation in relation to cerebral atrophy, with progressive periventricular white matter hypoattenuation, which may suggest normal pressure hydrocephalus with associated transependymal CSF flow. There is stable chronic small vessel ischemic white matter disease. No focus of acute abnormal brain attenuation is identified. ORBITS: The visualized portion of the orbits demonstrate no acute abnormality. SINUSES: The visualized paranasal sinuses and mastoid air cells demonstrate no acute abnormality. SOFT TISSUES/SKULL:  No acute abnormality of the visualized skull or soft tissues. CERVICAL BONES/ALIGNMENT: There is no acute fracture or subluxation. There is a stable mild 2 mm degenerative retrolisthesis of C3. No additional abnormal listhesis is identified. The vertebral bodies are otherwise normal in height and alignment. The posterior elements are intact and aligned. No destructive osseous lesion is seen. CERVICAL DEGENERATIVE CHANGES: There is stable mild multilevel spondylosis, most notable at C4 through C6. There is stable mild disc space loss and endplate sclerosis at O6-E4, C4-C5, and C5-C6. There are mild disc osteophyte protrusions at C3-C4, C4-C5, and C5-C6, without significant central canal stenosis. However, the combination of multilevel bilateral uncovertebral and facet hypertrophy leads to varying degrees of multilevel bilateral neuroforaminal stenosis. CERVICAL SOFT TISSUES: The cervical soft tissues are unremarkable. The lung apices are clear. 1. No acute intracranial abnormality. 2. Findings suggestive of normal pressure hydrocephalus. Clinical correlation is advised. 3. No acute fracture or subluxation of the cervical spine. CT LUMBAR SPINE WO CONTRAST    Result Date: 9/29/2022  EXAMINATION: CT OF THE LUMBAR SPINE WITHOUT CONTRAST  9/29/2022 TECHNIQUE: CT of the lumbar spine was performed without the administration of intravenous contrast. Multiplanar reformatted images are provided for review. Adjustment of mA and/or kV according to patient size was utilized.   Automated exposure control, iterative reconstruction, and/or weight based adjustment of the mA/kV was utilized to reduce the radiation dose to as low as reasonably achievable. COMPARISON: CT from January 17, 2020 HISTORY: ORDERING SYSTEM PROVIDED HISTORY: pain, possible fall TECHNOLOGIST PROVIDED HISTORY: Reason for exam:->pain, possible fall Decision Support Exception - unselect if not a suspected or confirmed emergency medical condition->Emergency Medical Condition (MA) Reason for Exam: pain, possible fall Additional signs and symptoms: none Relevant Medical/Surgical History: none FINDINGS: BONES/ALIGNMENT: There is an fracture of the T12 vertebral body with estimated 35% vertebral body height loss. This is favored to be acute. Minimal bony retropulsion at the posterosuperior endplate measuring 4 mm. No significant bony spinal canal stenosis however. Findings are new from January 17, 2020 and favored to be acute. Increased lucency at the superior endplate is favored to be related to the fracture with osteopenia. Pathologic fractures felt less likely, however attention on follow-up. Old right rib 11 fracture with bony callus. DEGENERATIVE CHANGES: Degenerative endplate changes most significant at L4-S1. Multilevel facet arthrosis. SOFT TISSUES/RETROPERITONEUM: No paraspinal mass is seen. Acute compression fracture of T12 with 35% vertebral body height. Mild 4 mm of bony retropulsion without significant bony spinal canal stenosis. Prominent degenerative disc disease most significant at L4-S1. XR CHEST PORTABLE    Result Date: 9/29/2022  EXAMINATION: ONE XRAY VIEW OF THE CHEST 9/29/2022 2:22 pm COMPARISON: 03/23/2021 HISTORY: ORDERING SYSTEM PROVIDED HISTORY: fall, confusion, sob, h/o of  TECHNOLOGIST PROVIDED HISTORY: Reason for exam:->fall, confusion, sob, h/o of  Reason for Exam: FALL CONFUSION FINDINGS: The cardiac silhouette and mediastinal contours are stable.   The lungs are hyperinflated, likely related to underlying emphysema. No focal lung infiltrate. No pleural effusion or pneumothorax. Old left-sided rib fracture is noted. There is also a left clavicular fracture that has incompletely healed, age indeterminate. 1. Emphysema without evidence of a focal lung infiltrate. 2. Left clavicular fracture, of uncertain age. 3. Old, healed left-sided rib fracture. XR HIP 2-3 VW W PELVIS LEFT    Result Date: 9/29/2022  EXAMINATION: ONE XRAY VIEW OF THE PELVIS AND TWO XRAY VIEWS LEFT HIP 9/29/2022 2:24 pm COMPARISON: 03/23/2021 pelvis radiographs HISTORY: ORDERING SYSTEM PROVIDED HISTORY: pain, limited ROM, possible fall TECHNOLOGIST PROVIDED HISTORY: Reason for exam:->pain, limited ROM, possible fall Reason for Exam: PAIN FINDINGS: There is an acute fracture of the left femoral neck with associated foreshortening and mild lateral displacement of the distal fracture fragment. There is mild varus angulation of the fracture components. The femoral head is well seated within the acetabulum. No additional fractures are identified. Degenerative changes of the lower lumbar spine. Brachytherapy seeds overlie the pelvis. Acute, mildly displaced left femoral neck fracture with associated foreshortening and mild lateral displacement of the distal fracture fragment.        Personally reviewed Lab Studies, Imaging, and discussed case with patient and his daughter and his wife    Electronically signed by Jorje Olmedo MD on 9/29/2022 at 5:44 PM

## 2022-09-29 NOTE — ED PROVIDER NOTES
[address] ENCOUNTER        Pt Name: Cintia Marquez  MRN: [id number]  [place] [birth year]  Date of evaluation: 9/29/2022  Provider: Allen Shin PA-C  PCP: Lennie Gross MD  Note Started: 1:46 PM EDT       I have seen and evaluated this patient with my supervising physician Kinjal Iglesias DO. Triage CHIEF COMPLAINT       Chief Complaint   Patient presents with    Fever    Other     Urinary incontance         HISTORY OF PRESENT ILLNESS   (Location/Symptom, Timing/Onset, Context/Setting, Quality, Duration, Modifying Factors, Severity)  Note limiting factors. Chief Complaint: weakness, urinary incontinence    Cintia Marquez is a 74 y.o. male who presents accompanied with his daughter with concern for potential altered mental status, weakness. Daughter at bedside assists  history. She had contacted patient on the phone yesterday morning and thought that he was a little bit off. She went over to his house this morning, and he smelled of urine, and she thought that appeared like he had not been up on his feet. Complaining of left hip pain, unable to walk, mentioning he typically is very mobile. He states he felt some nausea earlier today; he mentions some urinary incontinency lately and some lumbar pain; He is described as a daily drinker pt and family deny any issues from his drinking and no desire to quit at this time. Denies any headache, known injury or fall, cp, sob, confusion. Nursing Notes were all reviewed and agreed with or any disagreements were addressed in the HPI.     REVIEW OF SYSTEMS    (2-9 systems for level 4, 10 or more for level 5)     Review of Systems    PAST MEDICAL HISTORY     Past Medical History:   Diagnosis Date    Anxiety     Cancer [facility])     prostate    COPD (chronic obstructive pulmonary disease) (HCC)     Glaucoma     Hyperlipidemia     Hypertension        SURGICAL HISTORY Past Surgical History:   Procedure Laterality Date    COLONOSCOPY      COLONOSCOPY N/A 1/18/2020    COLONOSCOPY CONTROL HEMORRHAGE/BIOPSY performed by Damon Silva MD at [place] [address]    radiation seeds implanted by Dr Coye Phalen       Previous Medications    ALPRAZOLAM (XANAX) 0.25 MG TABLET    Take 0.25 mg by mouth every 6 hours as needed for Sleep or Anxiety    ASPIRIN EC 81 MG EC TABLET    Take 1 tablet by mouth daily Resume after 2 weeks (02/03/2020)    ATORVASTATIN (LIPITOR) 40 MG TABLET    Take 40 mg by mouth daily    BRIMONIDINE-TIMOLOL (COMBIGAN) 0.2-0.5 % OPHTHALMIC SOLUTION    Place 1 drop into both eyes every 12 hours    BRINZOLAMIDE (AZOPT) 1 % OPHTHALMIC SUSPENSION    Place 1 drop into the left eye 3 times daily     DIPHENOXYLATE-ATROPINE (LOMOTIL) 2.5-0.025 MG PER TABLET    Take 2 tablets by mouth daily as needed for Diarrhea    FENOFIBRATE (TRICOR) 145 MG TABLET    Take 145 mg by mouth daily    FERROUS SULFATE (FE TABS) 325 (65 FE) MG EC TABLET    Take 1 tablet by mouth 2 times daily    FOLIC ACID (FOLVITE) 1 MG TABLET    Take 1 tablet by mouth daily    LOSARTAN (COZAAR) 25 MG TABLET    Take 25 mg by mouth daily    METOPROLOL TARTRATE (LOPRESSOR) 50 MG TABLET    Take 1 tablet by mouth 2 times daily    NICOTINE (NICODERM CQ) 14 MG/24HR    Place 1 patch onto the skin daily    NIFEDIPINE (PROCARDIA XL) 60 MG EXTENDED RELEASE TABLET    Take 60 mg by mouth daily    NUTRITIONAL SUPPLEMENTS (JUICE PLUS FIBRE PO)    Take 2 each by mouth 2 times daily    PANTOPRAZOLE (PROTONIX) 40 MG TABLET    Take 1 tablet by mouth every morning (before breakfast)    TIOTROPIUM (SPIRIVA RESPIMAT) 2.5 MCG/ACT AERS INHALER    Inhale 1 puff into the lungs daily    TRAVOPROST, BENZALKONIUM, (TRAVATAN) 0.004 % OPHTHALMIC SOLUTION    Place 1 drop into the left eye nightly     VITAMIN B-1 100 MG TABLET    Take 1 tablet by mouth daily       ALLERGIES Patient has no known allergies. FAMILYHISTORY     History reviewed. No pertinent family history. SOCIAL HISTORY       Social History     Socioeconomic History    Marital status:      Spouse name: None    Number of children: None    Years of education: None    Highest education level: None   Tobacco Use    Smoking status: Every Day     Packs/day: 1.50     Types: Cigarettes    Smokeless tobacco: Never   Vaping Use    Vaping Use: Never used   Substance and Sexual Activity    Alcohol use: Yes     Alcohol/week: 4.0 - 5.0 standard drinks     Types: 4 - 5 Shots of liquor per week     Comment: mixed drinks per day, whiskey    Drug use: Never       SCREENINGS    Abi Coma Scale  Eye Opening: Spontaneous  Best Verbal Response: Oriented  Best Motor Response: Obeys commands  Abi Coma Scale Score: 15        PHYSICAL EXAM    (up to 7 for level 4, 8 or more for level 5)     ED Triage Vitals   BP Temp Temp Source Heart Rate Resp SpO2 Height Weight   09/29/22 1319 09/29/22 1319 09/29/22 1319 09/29/22 1319 09/29/22 1319 09/29/22 1319 09/29/22 1324 09/29/22 1324   (!) 154/91 98.3 °F (36.8 °C) Oral (!) 103 18 95 % 5' 8\" (1.727 m) 132 lb (59.9 kg)       Physical Exam    EMERGENCY DEPARTMENT COURSE:         Pt is a 74 y.o. male who presents with above HPI. I saw patient shortly after arrival to exam room. He is accompanied by his daughter who assist with history. She was concerned as patient was not acting normal yesterday, and was found down today with them. Hip injury and unable to ambulate. Patient answering questions appropriately appears to be alert and oriented GCS 15. He is unable to flex his left hip. Some mild lumbar pain. He is mildly tachycardic. He is describing some urinary incontinence has been going on for a few days. Otherwise exam appears to be unremarkable.    Plan for WA protocol, potential injury or fall, plan for lab work, CK radiograph imaging of left hip, CT head cervical spine, lumbar spine, UA    @1420 critical call, elevated lactic acid 2.7. Patient also noted leukocytosis. Leukocytosis plus heart rate, meeting sepsis criteria now with elevated lactic acid, septic shock. Repeat lactic acid have been ordered, initial 30 mill per KG fluid bolus has been ordered. IV antibiotics ordered. Urinalysis and imaging still pending at this time.   Vitals do appear to be stable    Patient was given thefollowing medications:  Medications   sodium chloride flush 0.9 % injection 5-40 mL (has no administration in time range)   sodium chloride flush 0.9 % injection 5-40 mL (has no administration in time range)   0.9 % sodium chloride infusion (has no administration in time range)   thiamine tablet 100 mg (100 mg Oral Given 9/29/22 1404)   LORazepam (ATIVAN) tablet 1 mg (has no administration in time range)     Or   LORazepam (ATIVAN) injection 1 mg (has no administration in time range)     Or   LORazepam (ATIVAN) tablet 2 mg (has no administration in time range)     Or   LORazepam (ATIVAN) injection 2 mg (has no administration in time range)     Or   LORazepam (ATIVAN) tablet 3 mg (has no administration in time range)     Or   LORazepam (ATIVAN) injection 3 mg (has no administration in time range)     Or   LORazepam (ATIVAN) tablet 4 mg (has no administration in time range)     Or   LORazepam (ATIVAN) injection 4 mg (has no administration in time range)   vancomycin (VANCOCIN) 1,500 mg in dextrose 5 % 500 mL IVPB (1,500 mg IntraVENous New Bag 9/29/22 1800)   0.9 % NaCl bolus (0 mL/kg × 68.4 kg (Ideal) IntraVENous Stopped 9/29/22 1803)   cefepime (MAXIPIME) 2000 mg IVPB minibag (2,000 mg IntraVENous New Bag 9/29/22 1753)   ondansetron (ZOFRAN) injection 4 mg (4 mg IntraVENous Given 9/29/22 1744)   fentaNYL (SUBLIMAZE) injection 50 mcg (50 mcg IntraVENous Given 9/29/22 1744)         DIFFERENTIAL DIAGNOSIS/MDM:     Patient's work-up reviewed, patient is meeting severe sepsis criteria, chest x-ray urinalysis do Unremarkable. No obvious soft tissue infection. Patient remains alert and oriented, mildly anxious, not meeting CIWA protocol for Ativan at this point. Analgesics were offered, and has had some improvement in his pain. Left hip fracture x-ray does show an acute left femoral neck fracture. CT lumbar shows no acute T12 compression fracture, CT head No acute intracranial abnormality, however f does appear to be consistent with normal pressure hydrocephalus. Apparently he had a visit with his PCP office earlier this week and been at his baseline. No reported loss of consciousness and does not appear to be altered at this time. Patient's because of fall or injury unknown, differentials would include normal pressure hydrocephalus, related to his alcohol use, mechanical fall. I have advised for admission. Patient and family are agreeable to this as well. Patient discussed with ED attending Dr. Fredo Bro who had facetime with patient and  agreed with work-up and disposition    @9984 on-call orthopedics was paged, was connected with Dr. David Reis. I discussed presentation, concern for left hip fracture, sepsis, normal pressure hydrocephalus, and likely admission. He agreed to see patient, recommending patient to be kept n.p.o. at midnight for surgery. @8411 neurosurgery as patient was connected with PRAVIN Brown, discussed concern for normal pressure hydrocephalus, and acute T12 compression fracture. She agreed to see patient in consult. @3677 patient discussed with accepted by hospitalist Dr. Yfn Hernandez                Is this patient to be included in the SEP-1 Core Measure due to severe sepsis or septic shock? Yes   SEP-1 CORE MEASURE DATA      Sepsis Criteria   Severe Sepsis Criteria   Septic Shock Criteria     Must be confirmed or suspected to move forward with diagnosis of sepsis.     Must meet 2:    [] Temperature > 100.9 F (38.3 C)        or < 96.8 F (36 C)  [x] HR > 90  [x] RR > 20  [x] WBC > 12 or < 4 or 10% bands      AND:      [x] Infection Confirmed or        Suspected. Must meet 1:    [x] Lactate > 2       or   [] Signs of Organ Dysfunction:    - SBP < 90 or MAP < 65  - Altered mental status  - Creatinine > 2 or increased from      baseline  - Urine Output < 0.5 ml/kg/hr  - Bilirubin > 2  - INR > 1.5 (not anticoagulated)  - Platelets < 018,016  - Acute Respiratory Failure as     evidenced by new need for NIPPV     or mechanical ventilation      [] No criteria met for Severe Sepsis. Must meet 1:    [] Lactate > 4        or   [] SBP < 90 or MAP < 65 for at        least two readings in the first        hour after fluid bolus        administration      [] Vasopressors initiated (if hypotension persists after fluid resuscitation)        [x] No criteria met for Septic Shock. Patient Vitals for the past 6 hrs:   BP Temp Pulse Resp SpO2 Height Weight Percent Weight Change   09/29/22 1319 (!) 154/91 98.3 °F (36.8 °C) (!) 103 18 95 % -- -- --   09/29/22 1324 -- -- -- -- -- 5' 8\" (1.727 m) 132 lb (59.9 kg) 0   09/29/22 1400 (!) 166/76 -- (!) 105 -- -- -- -- --   09/29/22 1816 -- -- (!) 101 22 94 % -- -- --   09/29/22 1817 (!) 153/70 -- -- -- -- -- -- --      Recent Labs     09/29/22  1328   WBC 16.5*   CREATININE 0.5*   BILITOT 0.5   *         Time Severe Sepsis Identified: 1420    Fluid Resuscitation Rational: at least 30mL/kg based on entered actual weight at time of triage    Repeat lactate level: ordered and pending at this time    Reassessment Exam:   Not applicable. Patient does not have septic shock.     Vitals:    Vitals:    09/29/22 1324 09/29/22 1400 09/29/22 1816 09/29/22 1817   BP:  (!) 166/76  (!) 153/70   Pulse:  (!) 105 (!) 101    Resp:   22    Temp:       TempSrc:       SpO2:   94%    Weight: 132 lb (59.9 kg)      Height: 5' 8\" (1.727 m)          CONSULTS:   IP CONSULT TO ORTHOPEDIC SURGERY  IP CONSULT TO NEUROSURGERY  IP CONSULT TO HOSPITALIST  IP CONSULT TO PHARMACY     CRITICAL CARE TIME   N/A      DIAGNOSTIC RESULTS   LABS:    Labs Reviewed   CBC WITH AUTO DIFFERENTIAL - Abnormal; Notable for the following components:       Result Value    WBC 16.5 (*)     RBC 4.06 (*)     Hemoglobin 12.6 (*)     Hematocrit 37.0 (*)     Platelets 132 (*)     Bands Relative 2 (*)     Segs Relative 90.0 (*)     Lymphocytes % 2.0 (*)     Monocytes % 6.0 (*)     All other components within normal limits   COMPREHENSIVE METABOLIC PANEL - Abnormal; Notable for the following components:    Sodium 132 (*)     Chloride 98 (*)     Creatinine 0.5 (*)     Glucose 136 (*)     Total Protein 6.1 (*)     All other components within normal limits   LACTATE, SEPSIS - Abnormal; Notable for the following components:    Lactic Acid, Sepsis 2.7 (*)     All other components within normal limits   CK - Abnormal; Notable for the following components: Total  (*)     All other components within normal limits   URINALYSIS WITH REFLEX TO CULTURE - Abnormal; Notable for the following components:    Ketones, Urine TRACE (*)     Protein, UA 30 (*)     Mucus, UA FEW (*)     All other components within normal limits   SALICYLATE LEVEL - Abnormal; Notable for the following components:    Salicylate Lvl <3.2 (*)     All other components within normal limits   ACETAMINOPHEN LEVEL - Abnormal; Notable for the following components:    Acetaminophen Level <5.0 (*)     All other components within normal limits   COVID-19, RAPID   CULTURE, BLOOD 1   CULTURE, BLOOD 1   LIPASE   TROPONIN   ETHANOL   AMMONIA   LACTATE, SEPSIS       When ordered, only abnormal lab results are displayed. All other labs were within normal range or not returned as of this dictation. EKG: When ordered, EKG's are interpreted by the Emergency Department Physician in the absence of a cardiologist.  Please see their note for interpretation of EKG.       RADIOLOGY:   Non-plain film images such as CT, Ultrasound and MRI are read by the radiologistStacey Fried radiographic images are visualized andpreliminarily interpreted by the  ED Provider with the below findings:      Interpretation perthe Radiologist below, if available at the time of this note:    Wilberto   Final Result   Acute compression fracture of T12 with 35% vertebral body height. Mild 4 mm   of bony retropulsion without significant bony spinal canal stenosis. Prominent degenerative disc disease most significant at L4-S1. CT CERVICAL SPINE WO CONTRAST   Preliminary Result   1. No acute intracranial abnormality. 2. Findings suggestive of normal pressure hydrocephalus. Clinical   correlation is advised. 3. No acute fracture or subluxation of the cervical spine. CT HEAD WO CONTRAST   Preliminary Result   1. No acute intracranial abnormality. 2. Findings suggestive of normal pressure hydrocephalus. Clinical   correlation is advised. 3. No acute fracture or subluxation of the cervical spine. XR HIP 2-3 VW W PELVIS LEFT   Final Result   Acute, mildly displaced left femoral neck fracture with associated   foreshortening and mild lateral displacement of the distal fracture fragment. XR CHEST PORTABLE   Final Result   1. Emphysema without evidence of a focal lung infiltrate. 2. Left clavicular fracture, of uncertain age. 3. Old, healed left-sided rib fracture. CT ABDOMEN PELVIS W IV CONTRAST Additional Contrast? None    (Results Pending)     No results found. PROCEDURES   Unless otherwise noted below, none     Procedures      FINAL IMPRESSION      1. Fall, initial encounter    2. Closed fracture of neck of left femur, initial encounter ([address]    3. T12 compression fracture, initial encounter ([address]    4. Normal pressure hydrocephalus (HCC)    5. Alcohol use    6.  Severe sepsis [facility])          DISPOSITION/PLAN   DISPOSITION Admitted 09/29/2022 05:43:49 PM      PATIENT REFERREDTO:  No follow-up provider

## 2022-09-29 NOTE — ED PROVIDER NOTES
I independently examined and evaluated Kiersten José. In brief, patient presents to the emergency department for evaluation of possible fall. Patient states he does not remember falling. Per patient he was sitting in the chair when his daughter came and got him. He states he has not stood or walked since last evening. He states his daughter called 911 because he was unable to stand to her liking. Patient states he does drink alcohol he drinks whiskey daily. Patient states he is on Plavix blood thinners. Patient states he does have heart disease 5 stents hypertension hyperlipidemia. Patient does admit to urinary incontinence. He states unable to make it to the bathroom. Denies any headache vision changes no chest pain short of breath nausea vomiting diarrhea abdominal pain fever chills cough. Patient states he has had a little bit of dizziness lightheadedness no dysuria hematuria. Patient here for evaluation. .    Focused exam revealed head normocephalic atraumatic, pupils equal round react to light, extraocular muscles intact, normal respirations, breathing nonlabored, sinus tachycardia, abdomen soft and benign normal bowel sounds, pain to the left hip, unable to lift the left lower extremity off of the bed, intact distal pulses bilateral lower extremities, left lower extremity shortened and externally rotated, alert and oriented x3,    ED course: Patient presents to the emergency department for difficulty ambulating or standing or walking or taking care of himself when his daughter presented to his home today. Patient was ordered some laboratory and imaging studies. Patient meeting sepsis criteria ordered blood culture lactic antibiotics. Urinalysis negative for urinary tract infection, normal ammonia level. Patient white count elevated 16.5. Patient hemoglobin 12.6, platelets elevated 535. Patient sodium slightly low 132, normal potassium kidney function calcium. Patient glucose 136.   Patient with normal liver enzymes. Patient lactic acid up to 2.7. Patient does admit to alcohol use daily has not drink since Tuesday states he normally drinks whiskey. Patient with normal lipase. Patient CK level 181. Patient negative troponin. EKG sinus tachycardia rate of 103, left anterior fascicular block, nonspecific ST and T wave abnormality, ventricular rate of 103, IL interval 176, QRS duration 88, QT/QTc 328/429, no ST elevations noted. Patient with negative alcohol salicylate and acetaminophen level. COVID test is negative. CT of the lumbar spine shows acute fracture of T12 with 35% vertebral body height, mild 4 mm of bony retropulsion without significant bony spinal canal stenosis, dominant degenerative disease most significant at L4-L5. CT of the head no acute intracranial abnormality, normal pressure hydrocephalus, no acute fracture subluxation of the cervical spine. Pelvis left hip fracture shows acute mild displaced left femoral neck fracture with associated foreshortening and mild lateral displacement of the distal fragment. Chest x-ray emphysema without focal lung infiltrate, left clavicular fracture of uncertain age, old healed left-sided rib fracture. Neurosurgery, orthopedic surgeon, hospitalist consulted for admission. Patient updated on labs and imaging study findings. All diagnostic, treatment, and disposition decisions were made by myself in conjunction with the advanced practice provider.     BP (!) 166/76   Pulse (!) 105   Temp 98.3 °F (36.8 °C) (Oral)   Resp 18   Ht 5' 8\" (1.727 m)   Wt 132 lb (59.9 kg)   SpO2 95%   BMI 20.07 kg/m²     Labs Reviewed   CBC WITH AUTO DIFFERENTIAL - Abnormal; Notable for the following components:       Result Value    WBC 16.5 (*)     RBC 4.06 (*)     Hemoglobin 12.6 (*)     Hematocrit 37.0 (*)     Platelets 545 (*)     Bands Relative 2 (*)     Segs Relative 90.0 (*)     Lymphocytes % 2.0 (*)     Monocytes % 6.0 (*)     All other components within normal limits   COMPREHENSIVE METABOLIC PANEL - Abnormal; Notable for the following components:    Sodium 132 (*)     Chloride 98 (*)     Creatinine 0.5 (*)     Glucose 136 (*)     Total Protein 6.1 (*)     All other components within normal limits   LACTATE, SEPSIS - Abnormal; Notable for the following components:    Lactic Acid, Sepsis 2.7 (*)     All other components within normal limits   CK - Abnormal; Notable for the following components: Total  (*)     All other components within normal limits   URINALYSIS WITH REFLEX TO CULTURE - Abnormal; Notable for the following components:    Ketones, Urine TRACE (*)     Protein, UA 30 (*)     Mucus, UA FEW (*)     All other components within normal limits   SALICYLATE LEVEL - Abnormal; Notable for the following components:    Salicylate Lvl <5.5 (*)     All other components within normal limits   ACETAMINOPHEN LEVEL - Abnormal; Notable for the following components:    Acetaminophen Level <5.0 (*)     All other components within normal limits   COVID-19, RAPID   CULTURE, BLOOD 1   CULTURE, BLOOD 1   LIPASE   TROPONIN   ETHANOL   AMMONIA   LACTATE, SEPSIS   URINE MICROSCOPIC WITH REFLEX TO CULTURE       CT LUMBAR SPINE WO CONTRAST   Final Result   Acute compression fracture of T12 with 35% vertebral body height. Mild 4 mm   of bony retropulsion without significant bony spinal canal stenosis. Prominent degenerative disc disease most significant at L4-S1. CT CERVICAL SPINE WO CONTRAST   Preliminary Result   1. No acute intracranial abnormality. 2. Findings suggestive of normal pressure hydrocephalus. Clinical   correlation is advised. 3. No acute fracture or subluxation of the cervical spine. CT HEAD WO CONTRAST   Preliminary Result   1. No acute intracranial abnormality. 2. Findings suggestive of normal pressure hydrocephalus. Clinical   correlation is advised. 3. No acute fracture or subluxation of the cervical spine. XR HIP 2-3 VW W PELVIS LEFT   Final Result   Acute, mildly displaced left femoral neck fracture with associated   foreshortening and mild lateral displacement of the distal fracture fragment. XR CHEST PORTABLE   Final Result   1. Emphysema without evidence of a focal lung infiltrate. 2. Left clavicular fracture, of uncertain age. 3. Old, healed left-sided rib fracture. ICD-10-CM    1. Fall, initial encounter  [address] XXXA       2. Closed fracture of neck of left femur, initial encounter ([facility]  S72.002A       3. [facility]12 compression fracture, initial encounter [facility]  S22.080A       4. Normal pressure hydrocephalus (HCC)  G91.2       5. Alcohol use  Z72.89               I personally saw the patient and performed a substantive portion of the visit including all aspects of the medical decision making. For all further details of the patient's emergency department visit, please see the advanced practice provider's documentation. Comment: Please note this report has been produced using speech recognition software and may contain errors related to that system including errors in grammar, punctuation, and spelling, as well as words and phrases that may be inappropriate. If there are any questions or concerns please feel free to contact the dictating provider for clarification.         Cherelle Rivas,   09/30/22 4643

## 2022-09-29 NOTE — FLOWSHEET NOTE
Patient arrive on unit with family from ER in no distress. S/P Fall at home and left hip fracture as per family, with a thomas acth. Dx Severe Sepsis. Denies pain at this time. He is NPO per diet order. Family want patient to eat hence he has not eaten since this morning. Pt PASS swallow test. Oriented to unit and all safety precautions to be observed. Call light within pt reach. MD notified.       09/29/22 1944   Vitals   Temp 98.6 °F (37 °C)   Temp Source Oral   Heart Rate 96   Heart Rate Source Monitor   Resp 17   BP (!) 148/66   MAP (mmHg) 89   BP Location Left upper arm   BP Upper/Lower Upper   BP Method Automatic   Patient Position Semi fowlers   Level of Consciousness 0   MEWS Score 1   Cardiac Rhythm Sinus rhythm   Pain Assessment   Pain Assessment 0-10   Pain Level 0   Patient's Stated Pain Goal 0 - No pain   Pain Location Hip   Pain Orientation Left   Pain Descriptors Aching   Pain Frequency Intermittent   Oxygen Therapy   SpO2 95 %   Pulse Oximetry Type Intermittent   Pulse Oximeter Device Mode Intermittent   Pulse Oximeter Device Location Left;Finger   Height and Weight   Height 5' 8\" (1.727 m)   Weight 134 lb 8 oz (61 kg)   BSA (Calculated - sq m) 1.71 sq meters   BMI (Calculated) 20.5
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
## 2022-09-30 NOTE — PROGRESS NOTES
Pt log roll turned, bed made. Pt complains of new brreakthrough pain, he is more alert and able to describe that his pain is in his leg, and back, but states vaguely \"well it all leticia hurts\"    Neurosurgery update, attending updated. Pain panel in place. Family updated and agreeable. Pt educated about movement in bed, and safety, verbalizes understanding.

## 2022-09-30 NOTE — CONSULTS
Neurosurgery   Consult Note      Reason for Consult: T12 fracture, concern for NPH  Consulting Physician: Addis Wilcox PA-C  Attending Physician: Leighton Lepe MD  Date of Admission: 9/29/2022  Subjective:   CHIEF COMPLAINT: S/P fall, confusion    HPI:  74 y.o. 1950  Who presented to the ED 9/29/22 after being found confused in his home. His daughter is an ICU nurse here at [facility]. She states that she last spoke with him Tuesday evening. She believes that he could have fallen that evening. She found him Thursday (day of arrival) smelling of urine and not being able to walk. He had been complaining of some back pain recently and was walking hunched over. CT of the head, cervical are non acute. Ct lumbar spine shows an acute T12 fracture with 4mm of retropulsion into the canal. He also has a left femoral neck fracture and is scheduled to undergo surgical fixation today with ortho. His CT head did show enlarged ventricles that are concerning for NPH. This is similar in appearance to a head CT obtained March 2021. The patient himself is alert to self, place, birthday, situation. He tells me he has no back pain currently, does have left hip pain. He is able to roll to his right side with some assistance, is not tender at T12. His daughter states that he does answer orientation questions appropriately but has confusion at times. She states that he was incontinent at home but does not have a history of incontinence. She states that he does live alone and until yesterday was driving and performing his own household chores such as laundry. She has noticed a decline in him over the past 6 months. He used to cook himself for steak nightly, has not been doing that for himself lately. She states that he does drink alcohol daily and is a heavy smoker. He does have a history of prostate cancer, was diagnosed over 10 years ago and is in remission. Patient is on ASA 81mg.  PMHx positive for cancer, COPD, HLD, HTN, daily alcohol use. PSHx positive for prostate surgery, tonsillectomy. Past Medical and Surgical History:       Diagnosis Date    Anxiety     Cancer [facility])     prostate    COPD (chronic obstructive pulmonary disease) ([facility] [address]     Glaucoma     Hyperlipidemia     Hypertension          Procedure Laterality Date    COLONOSCOPY      COLONOSCOPY N/A 1/18/2020    COLONOSCOPY CONTROL HEMORRHAGE/BIOPSY performed by Phil Hidalgo MD at [facility]  2011    radiation seeds implanted by Dr Salinas Hem History:    TOBACCO:   reports that he has been smoking cigarettes. He has been smoking an average of 1.5 packs per day. He has never used smokeless tobacco.  ETOH:   reports current alcohol use of about 4.0 - 5.0 standard drinks per week. Family History:   History reviewed. No pertinent family history.     Current Medications:    Current Facility-Administered Medications: sodium chloride flush 0.9 % injection 5-40 mL, 5-40 mL, IntraVENous, 2 times per day  sodium chloride flush 0.9 % injection 5-40 mL, 5-40 mL, IntraVENous, PRN  0.9 % sodium chloride infusion, , IntraVENous, PRN  LORazepam (ATIVAN) tablet 1 mg, 1 mg, Oral, Q1H PRN **OR** LORazepam (ATIVAN) injection 1 mg, 1 mg, IntraVENous, Q1H PRN **OR** LORazepam (ATIVAN) tablet 2 mg, 2 mg, Oral, Q1H PRN **OR** LORazepam (ATIVAN) injection 2 mg, 2 mg, IntraVENous, Q1H PRN **OR** LORazepam (ATIVAN) tablet 3 mg, 3 mg, Oral, Q1H PRN **OR** LORazepam (ATIVAN) injection 3 mg, 3 mg, IntraVENous, Q1H PRN **OR** LORazepam (ATIVAN) tablet 4 mg, 4 mg, Oral, Q1H PRN **OR** LORazepam (ATIVAN) injection 4 mg, 4 mg, IntraVENous, Q1H PRN  aspirin EC tablet 81 mg, 81 mg, Oral, Daily  atorvastatin (LIPITOR) tablet 40 mg, 40 mg, Oral, Daily  folic acid (FOLVITE) tablet 1 mg, 1 mg, Oral, Daily  metoprolol tartrate (LOPRESSOR) tablet 50 mg, 50 mg, Oral, BID  nicotine (NICODERM CQ) 14 MG/24HR 1 patch, 1 patch, TransDERmal, confusion and agitation. Objective:   PHYSICAL EXAM:      VITALS:  BP (!) 135/57   Pulse 76   Temp 98.5 °F (36.9 °C) (Oral)   Resp 23   Ht 5' 8\" (1.727 m)   Wt 134 lb 8 oz (61 kg)   SpO2 95%   BMI 20.45 kg/m²      24HR INTAKE/OUTPUT:    Intake/Output Summary (Last 24 hours) at 9/30/2022 0707  Last data filed at 9/29/2022 2000  Gross per 24 hour   Intake 4046 ml   Output 300 ml   Net 3746 ml     CONSTITUTIONAL:  Awake, alert, cooperative, no apparent distress, and appears stated age  HEENT: Hessie Lauth at 4mm bilaterally, EOMI, sclera white, conjunctive full, tongue protrudes midline  PSYCHIATRIC: Oriented to person place and time. No obvious depression or anxiety. MUSCULOSKELETAL: No obvious misalignment or effusion of the joints. No clubbing, cyanosis of the digits. SKIN:  normal skin color, texture, turgor and no redness, warmth, or swelling. NEUROLOGIC: Alert and oriented x4, face symmetrical, no obvious droop, speech clear and coherent, difficulty following multistep commands, sensation intact to light touch and pinprick sensation. Upper extremity strength: bilateral upper extremities able to resist gravity   Lower extremity strength: right lower extremities able to resist gravity, left lower extremity not tested due to fracture but was able to wiggle toes. DATA:    Old records have been reviewed    CBC:  Recent Labs     09/29/22  1328   WBC 16.5*   RBC 4.06*   HGB 12.6*   HCT 37.0*   *   MCV 91.1   MCH 31.0   MCHC 34.1   RDW 12.3   SEGSPCT 90.0*   BANDSPCT 2*      BMP:  Recent Labs     09/29/22  1328   *   K 4.3   CL 98*   CO2 23   BUN 15   CREATININE 0.5*   CALCIUM 8.9   GLUCOSE 136*        Radiology Review:  All pertinent images / reports were reviewed as a part of this visit. CT head and cervical spine 9/29/22  Impression   1. No acute intracranial abnormality. 2. Findings suggestive of normal pressure hydrocephalus. Clinical   correlation is advised.    3. No acute fracture or subluxation of the cervical spine. CT abd/pelvis 9/29/22  Impression   Moderate wall thickening along the duodenum and gastric antrum versus antral   gastritis versus duodenitis. Please correlate exam findings. Moderate severe sigmoid colonic diverticulosis. Left inguinal hernia containing a small loop of colon. This is of uncertain   clinical significance. Please correlate with exam findings. T12 compression fracture again noted. CT lumbar spine 9/29/22  Impression   Acute compression fracture of T12 with 35% vertebral body height. Mild 4 mm   of bony retropulsion without significant bony spinal canal stenosis. Prominent degenerative disc disease most significant at L4-S1. Assessment:   Concern for NPH  Acute T12 compression fracture   Left femoral neck fracture  Plan:   Patient presented to the ER 9/29/2022 with some Altered mental status, complaints of leg pain and inability to ambulate. Patient was found to have a left femoral neck fracture. Patient does not recall falling. His daughter who is an ICU nurse suspects that it happened either Tuesday or Wednesday. Patient is scheduled to undergo surgery for fixation of his femoral neck fracture. Our service was consulted for concerns of NPH noted on CT head and an acute T12 compression fracture with roughly 4 mm of retropulsion. Ventriculomegaly was also noted on CT of the head in March 2021, size has not changed significantly. Patient is currently alert and oriented x4. His daughter states that she has not noticed any significant dementia type symptoms but does state that he is a daily drinker and smokes heavily and she has noticed an overall decline in his functional status over the past 6 months. Patient was found to be incontinent in his home, both her and another family member present states that he does not suffer from significant urinary incontinence at baseline.   He is also very ambulatory at baseline and lives alone and does his household chores without any difficulties, he also was driving. Explained the work-up of NPH and that he would need a lumbar puncture followed by physical therapy for diagnosis of NPH. Suspect the patient has ventriculomegaly secondary to cerebral atrophy due to aging and alcohol use. Patient does not have any back pain currently, he is not tender to palpation at T12. Patient's family suspects that maybe the fracture is old, he has been complaining of back pain for several weeks and has been walking hunched over. We will order an MRI of the lumbar spine to gauge acuity of fracture. Would like to avoid placing patient in TLSO brace if it is chronic to avoid complicating recovery from his femur surgery. Daughter understands and is in agreement. Log roll patient and avoid bending/twisting of spine. Electronically signed by Inder Jacobo PA-C on 9/30/2022 at 7:07 AM    Supervising physician: Fe Solorio. Lois Bartlett MD.  Dr. Lois Bartlett was readily and continuously available by phone for direct consultation regarding the care of this patient. Time spent with patient in consultation, education, and collaboration with medical time is >50% of total time spent on case, including time spent in chart review and dictation. Total time spent: 40 minutes    Thank you for the opportunity to participate in the care of your patient.

## 2022-09-30 NOTE — ANESTHESIA PRE PROCEDURE
Department of Anesthesiology  Preprocedure Note       Name:  Milton Joshi   Age:  74 y.o.  :  1950                                          MRN:  [id number]         Date:  2022      Surgeon: Jenae Braun):  Patricia Whaley DO    Procedure: Procedure(s):  HIP HEMIARTHROPLASTY    Medications prior to admission:   Prior to Admission medications    Medication Sig Start Date End Date Taking?  Authorizing Provider   aspirin EC 81 MG EC tablet Take 1 tablet by mouth daily Resume after 2 weeks (2020)  Patient not taking: Reported on 2022   Alesha Dupont MD   folic acid (FOLVITE) 1 MG tablet Take 1 tablet by mouth daily  Patient not taking: Reported on 2022   Alesha Dupont MD   vitamin B-1 100 MG tablet Take 1 tablet by mouth daily  Patient not taking: Reported on 2022   Alesha Dupont MD   pantoprazole (PROTONIX) 40 MG tablet Take 1 tablet by mouth every morning (before breakfast)  Patient not taking: Reported on 2022   Alesha Dupont MD   ferrous sulfate (FE TABS) 325 (65 Fe) MG EC tablet Take 1 tablet by mouth 2 times daily  Patient not taking: Reported on 2022   Alesha Dupont MD   brimonidine-timolol (COMBIGAN) 0.2-0.5 % ophthalmic solution Place 1 drop into both eyes every 12 hours    Historical Provider, MD   tiotropium (SPIRIVA RESPIMAT) 2.5 MCG/ACT AERS inhaler Inhale 1 puff into the lungs daily    Historical Provider, MD   Nutritional Supplements (JUICE PLUS FIBRE PO) Take 2 each by mouth 2 times daily    Historical Provider, MD   metoprolol tartrate (LOPRESSOR) 50 MG tablet Take 1 tablet by mouth 2 times daily 16   Gunjan Burr MD   nicotine (NICODERM CQ) 14 MG/24HR Place 1 patch onto the skin daily  Patient not taking: Reported on 2022   Gunjan Burr MD   travoprost, benzalkonium, (TRAVATAN) 0.004 % ophthalmic solution Place 1 drop into the left eye nightly     Historical Provider, MD brinzolamide (AZOPT) 1 % ophthalmic suspension Place 1 drop into the left eye 3 times daily     Historical Provider, MD   ALPRAZolam (XANAX) 0.25 MG tablet Take 0.25 mg by mouth every 6 hours as needed for Sleep or Anxiety    Historical Provider, MD   atorvastatin (LIPITOR) 40 MG tablet Take 40 mg by mouth daily    Historical Provider, MD   fenofibrate (TRICOR) 145 MG tablet Take 145 mg by mouth daily    Historical Provider, MD   diphenoxylate-atropine (LOMOTIL) 2.5-0.025 MG per tablet Take 2 tablets by mouth daily as needed for Diarrhea    Historical Provider, MD   losartan (COZAAR) 25 MG tablet Take 25 mg by mouth daily    Historical Provider, MD   NIFEdipine (PROCARDIA XL) 60 MG extended release tablet Take 60 mg by mouth daily    Historical Provider, MD       Current medications:    Current Facility-Administered Medications   Medication Dose Route Frequency Provider Last Rate Last Admin    sodium chloride flush 0.9 % injection 5-40 mL  5-40 mL IntraVENous 2 times per day Ellie Kaufman MD   10 mL at 09/29/22 2043    sodium chloride flush 0.9 % injection 5-40 mL  5-40 mL IntraVENous PRN Ellie Kaufman MD        0.9 % sodium chloride infusion   IntraVENous PRN Ellie Kaufman MD        LORazepam (ATIVAN) tablet 1 mg  1 mg Oral Q1H PRVALERIA Kaufman MD        Or    LORazepam (ATIVAN) injection 1 mg  1 mg IntraVENous Q1H PRVALERIA Kaufman MD        Or    LORazepam (ATIVAN) tablet 2 mg  2 mg Oral Q1H PRVALERIA Kaufman MD        Or    LORazepam (ATIVAN) injection 2 mg  2 mg IntraVENous Q1H HAYDEE Kaufman MD   2 mg at 09/30/22 0105    Or    LORazepam (ATIVAN) tablet 3 mg  3 mg Oral Q1H PRVALERIA Kaufman MD        Or    LORazepam (ATIVAN) injection 3 mg  3 mg IntraVENous Q1H PRVALERIA Kaufman MD        Or    LORazepam (ATIVAN) tablet 4 mg  4 mg Oral Q1H PRVALERIA Kaufman MD        Or    LORazepam (ATIVAN) injection 4 mg  4 mg IntraVENous Q1H PRVALERIA Kaufman MD        aspirin EC tablet 81 mg  81 mg Oral Daily Blaine Amor MD        atorvastatin (LIPITOR) tablet 40 mg  40 mg Oral Daily Blaine Amor MD        folic acid (FOLVITE) tablet 1 mg  1 mg Oral Daily Blaine Amor MD        metoprolol tartrate (LOPRESSOR) tablet 50 mg  50 mg Oral BID Blaine Amor MD   50 mg at 09/30/22 0000    nicotine (NICODERM CQ) 14 MG/24HR 1 patch  1 patch TransDERmal Daily Blaine Amor MD        pantoprazole (PROTONIX) tablet 40 mg  40 mg Oral QAM AC Blaine Amor MD   40 mg at 09/30/22 0649    tiotropium (SPIRIVA RESPIMAT) 2.5 MCG/ACT inhaler 1 puff  1 puff Inhalation Daily Blaine Amor MD        thiamine tablet 100 mg  100 mg Oral Daily Blaine Amor MD        cefepime (MAXIPIME) 2000 mg IVPB minibag  2,000 mg IntraVENous Q12H Blaine Amor MD 12.5 mL/hr at 09/30/22 0608 2,000 mg at 09/30/22 1901    sodium chloride flush 0.9 % injection 5-40 mL  5-40 mL IntraVENous 2 times per day Blaine Amor MD        sodium chloride flush 0.9 % injection 5-40 mL  5-40 mL IntraVENous PRN Blaine Amor MD        0.9 % sodium chloride infusion   IntraVENous PRN Blaine Amor MD   Stopped at 09/30/22 9231    enoxaparin (LOVENOX) injection 40 mg  40 mg SubCUTAneous Daily Blaine Amor MD        ondansetron (ZOFRAN-ODT) disintegrating tablet 4 mg  4 mg Oral Q8H PRN Blaine Amor MD        Or    ondansetron [facility]) injection 4 mg  4 mg IntraVENous Q6H PRN Blaine Amor MD        polyethylene glycol [facility]) packet 17 g  17 g Oral Daily PRN Blanie Amor MD        acetaminophen (TYLENOL) tablet 650 mg  650 mg Oral Q6H PRN Blaine Amor MD        Or    acetaminophen (TYLENOL) suppository 650 mg  650 mg Rectal Q6H PRN Blaine Amor MD        vancomycin (VANCOCIN) 1,000 mg in dextrose 5 % 250 mL IVPB ([id number])  1,000 mg IntraVENous Q12H Blaine Amor MD   Stopped at 09/30/22 0703       Allergies:  No Known Allergies    Problem List:    Patient Active Problem List Diagnosis Code    NSTEMI (non-ST elevated myocardial infarction) ([facility]) I21.4    COPD (chronic obstructive pulmonary disease) ([facility]) J44.9    Essential hypertension I10    Hyperlipidemia E78.5    Glaucoma H40.9    Anxiety F41.9    Diverticulosis of colon with hemorrhage K57.31    Severe sepsis ([facility]) A41.9, R65.20       Past Medical History:        Diagnosis Date    Anxiety     Cancer ([facility] [address]     prostate    COPD (chronic obstructive pulmonary disease) ([facility] [address]     Glaucoma     Hyperlipidemia     Hypertension        Past Surgical History:        Procedure Laterality Date    COLONOSCOPY      COLONOSCOPY N/A 1/18/2020    COLONOSCOPY CONTROL HEMORRHAGE/BIOPSY performed by Ariana Shafer MD at [id number] [facility]  2011    radiation seeds implanted by Dr Abner Dixon History:    Social History     Tobacco Use    Smoking status: Every Day     Packs/day: 1.50     Types: Cigarettes    Smokeless tobacco: Never   Substance Use Topics    Alcohol use:  Yes     Alcohol/week: 4.0 - 5.0 standard drinks     Types: 4 - 5 Shots of liquor per week     Comment: mixed drinks per day, whiskey                                Ready to quit: Not Answered  Counseling given: Not Answered      Vital Signs (Current):   Vitals:    09/30/22 0030 09/30/22 0130 09/30/22 0400 09/30/22 0802   BP: (!) 150/59 (!) 135/57 (!) 135/57 (!) 140/64   Pulse: 94 76 76 74   Resp:   23 20   Temp:   36.9 °C (98.5 °F) 36.6 °C (97.9 °F)   TempSrc:   Oral Temporal   SpO2:    93%   Weight:       Height:                                                  BP Readings from Last 3 Encounters:   09/30/22 (!) 140/64   03/23/21 (!) 150/58   01/20/20 124/60       NPO Status: Time of last liquid consumption: 2200                        Time of last solid consumption: 1800                        Date of last liquid consumption: 09/29/22                        Date of last solid food consumption: 09/29/22    BMI:   Wt Readings from Last 3 Encounters:   09/29/22 134 lb 8 oz (61 kg)   03/23/21 155 lb (70.3 kg)   01/20/20 152 lb (68.9 kg)     Body mass index is 20.45 kg/m². CBC:   Lab Results   Component Value Date/Time    WBC 16.5 09/29/2022 01:28 PM    RBC 4.06 09/29/2022 01:28 PM    HGB 12.6 09/29/2022 01:28 PM    HCT 37.0 09/29/2022 01:28 PM    MCV 91.1 09/29/2022 01:28 PM    RDW 12.3 09/29/2022 01:28 PM     09/29/2022 01:28 PM       CMP:   Lab Results   Component Value Date/Time     09/29/2022 01:28 PM    K 4.3 09/29/2022 01:28 PM    CL 98 09/29/2022 01:28 PM    CO2 23 09/29/2022 01:28 PM    BUN 15 09/29/2022 01:28 PM    CREATININE 0.5 09/29/2022 01:28 PM    GFRAA >60 09/29/2022 01:28 PM    LABGLOM >60 09/29/2022 01:28 PM    GLUCOSE 136 09/29/2022 01:28 PM    PROT 6.1 09/29/2022 01:28 PM    CALCIUM 8.9 09/29/2022 01:28 PM    BILITOT 0.5 09/29/2022 01:28 PM    ALKPHOS 60 09/29/2022 01:28 PM    AST 22 09/29/2022 01:28 PM    ALT 15 09/29/2022 01:28 PM       POC Tests: No results for input(s): POCGLU, POCNA, POCK, POCCL, POCBUN, POCHEMO, POCHCT in the last 72 hours.     Coags:   Lab Results   Component Value Date/Time    PROTIME 11.1 01/17/2020 10:11 AM    PROTIME 9.8 09/28/2011 03:05 PM    INR 0.92 01/17/2020 10:11 AM    APTT 25.8 09/12/2016 02:55 PM       HCG (If Applicable): No results found for: PREGTESTUR, PREGSERUM, HCG, HCGQUANT     ABGs: No results found for: PHART, PO2ART, KEG7KFM, FUU8HZX, BEART, [id number]     Type & Screen (If Applicable):  No results found for: LABABO, LABRH    Drug/Infectious Status (If Applicable):  No results found for: HIV, HEPCAB    COVID-19 Screening (If Applicable):   Lab Results   Component Value Date/Time    COVID19 NOT DETECTED 09/29/2022 03:25 PM           Anesthesia Evaluation  Patient summary reviewed  Airway:           Dental:          Pulmonary:   (+) COPD:  current smoker                           Cardiovascular:    (+) hypertension:, past MI:, hyperlipidemia      ECG reviewed               Beta Blocker:  Dose within 24 Hrs      ROS comment:  Poor data quality, interpretation may be adversely affected   Sinus tachycardia   Possible Left atrial enlargement   Left anterior fascicular block   Nonspecific ST and T wave abnormality   Abnormal ECG   When compared with ECG of 23-MAR-2021 04:47,   Vent. rate has increased BY  51 BPM   Left anterior fascicular block is now present   ST no longer elevated in Lateral leads   Confirmed by [facility] Jarrett PEARSON ([id number]) on 9/29/2022 6:48:13 PM      Neuro/Psych:   (+) depression/anxiety             GI/Hepatic/Renal:             Endo/Other:    (+) blood dyscrasia: anemia, arthritis: OA., electrolyte abnormalities, malignancy/cancer. Abdominal:             Vascular: negative vascular ROS. Other Findings:           Anesthesia Plan      ASA 3       Induction: intravenous.                             WINNIE Valverde - CRNA   9/30/2022

## 2022-09-30 NOTE — PROGRESS NOTES
V2.0  [facility] Hospitalist Progress Note      Name:  Meredith Mercado /Age/Sex:   (74 y.o. male)   MRN & CSN:  [id number] & [id number] Encounter Date/Time: 2022 11:12 AM EDT    Location:  OR/NONE PCP: Doris Jiang MD       Hospital Day: 2    Assessment and Plan:   Meredith Mercado is a 74 y.o. male with L hip fracture      Plan:    Severe sepsis  -Unclear etiology. Chest x-ray is unremarkable. UA is negative. We will get a CT of his abdomen and pelvis evaluate contrast to look for an intra-abdominal source. -Blood cultures have been sent. We did get a sepsis bolus. Started on Vanco and cefepime. Alcohol abuse  Placed on CIWA protocol with Ativan. Left hip fracture  -arthroplasty      T12 compression fracture  -Neurosurgery has been consulted. Hypertension  -Holding home nifedipine in the setting of severe sepsis. Resume when appropriate. NPH  -Noted on CT. As per daughter he does have some incontinence falls however that could be explained by his alcohol. We will follow-up with neurology outpatient. Ppx: Lovenox  Dispo: TBD    Subjective:     Chief Complaint: Fever and Other (Urinary incontance)       L hip arthroplasty today. Patient seen postoperatively. Still shaking off the anesthesia but eaily awoken with verbal stimulus. Pain controlled for now. Nurse reporting sticky sputum, hypertonic saline nebs ordered. Review of Systems:    Review of Systems    10 point ROS negative except as stated above in \"subjective\" section    Objective:      Intake/Output Summary (Last 24 hours) at 2022 1112  Last data filed at 2022  Gross per 24 hour   Intake 4046 ml   Output 300 ml   Net 3746 ml        Vitals:   Vitals:    22 0802   BP: (!) 140/64   Pulse: 74   Resp: 20   Temp: 97.9 °F (36.6 °C)   SpO2: 93%       Physical Exam:     Exam not done today  ROS not obtained today     Medications:   Medications:    sodium chloride flush  5-40 mL IntraVENous 2 times per day    tranexamic acid  1,000 mg IntraVENous On Call to OR    sodium chloride flush  5-40 mL IntraVENous 2 times per day    aspirin EC  81 mg Oral Daily    atorvastatin  40 mg Oral Daily    folic acid  1 mg Oral Daily    metoprolol tartrate  50 mg Oral BID    nicotine  1 patch TransDERmal Daily    pantoprazole  40 mg Oral QAM AC    tiotropium  1 puff Inhalation Daily    thiamine  100 mg Oral Daily    cefepime  2,000 mg IntraVENous Q12H    sodium chloride flush  5-40 mL IntraVENous 2 times per day    enoxaparin  40 mg SubCUTAneous Daily    vancomycin  1,000 mg IntraVENous Q12H      Infusions:    lactated ringers 125 mL/hr at 09/30/22 1041    sodium chloride      sodium chloride      sodium chloride Stopped (09/30/22 0605)     PRN Meds: sodium chloride flush, 5-40 mL, PRN  sodium chloride, , PRN  sodium chloride flush, 5-40 mL, PRN  sodium chloride, , PRN  LORazepam, 1 mg, Q1H PRN   Or  LORazepam, 1 mg, Q1H PRN   Or  LORazepam, 2 mg, Q1H PRN   Or  LORazepam, 2 mg, Q1H PRN   Or  LORazepam, 3 mg, Q1H PRN   Or  LORazepam, 3 mg, Q1H PRN   Or  LORazepam, 4 mg, Q1H PRN   Or  LORazepam, 4 mg, Q1H PRN  sodium chloride flush, 5-40 mL, PRN  sodium chloride, , PRN  ondansetron, 4 mg, Q8H PRN   Or  ondansetron, 4 mg, Q6H PRN  polyethylene glycol, 17 g, Daily PRN  acetaminophen, 650 mg, Q6H PRN   Or  acetaminophen, 650 mg, Q6H PRN        Labs           Imaging/Diagnostics Last 24 Hours   CT HEAD WO CONTRAST    Result Date: 9/29/2022  EXAMINATION: CT OF THE HEAD WITHOUT CONTRAST; CT OF THE CERVICAL SPINE WITHOUT CONTRAST 9/29/2022 2:42 pm; 9/29/2022 2:43 pm TECHNIQUE: CT of the head was performed without the administration of intravenous contrast. Automated exposure control, iterative reconstruction, and/or weight based adjustment of the mA/kV was utilized to reduce the radiation dose to as low as reasonably achievable.; CT of the cervical spine was performed without the administration of intravenous contrast. Multiplanar reformatted images are provided for review. Automated exposure control, iterative reconstruction, and/or weight based adjustment of the mA/kV was utilized to reduce the radiation dose to as low as reasonably achievable. COMPARISON: 03/23/2021 HISTORY: ORDERING SYSTEM PROVIDED HISTORY: possible AMS, unknown fall TECHNOLOGIST PROVIDED HISTORY: Reason for exam:->possible AMS, unknown fall Has a \"code stroke\" or \"stroke alert\" been called? ->No Decision Support Exception - unselect if not a suspected or confirmed emergency medical condition->Emergency Medical Condition (MA) Reason for Exam: possible AMS, unknown fall Additional signs and symptoms: none Relevant Medical/Surgical History: none; ORDERING SYSTEM PROVIDED HISTORY: possible fall, possible ams TECHNOLOGIST PROVIDED HISTORY: Reason for exam:->possible fall, possible ams Decision Support Exception - unselect if not a suspected or confirmed emergency medical condition->Emergency Medical Condition (MA) Reason for Exam: possible fall, possible ams Additional signs and symptoms: none Relevant Medical/Surgical History: none FINDINGS: BRAIN/VENTRICLES: There is no acute intracranial hemorrhage, mass effect or midline shift. No abnormal extra-axial fluid collection. The gray-white differentiation is maintained without evidence of an acute infarct. There remains stable disproportionate ventricular dilatation in relation to cerebral atrophy, with progressive periventricular white matter hypoattenuation, which may suggest normal pressure hydrocephalus with associated transependymal CSF flow. There is stable chronic small vessel ischemic white matter disease. No focus of acute abnormal brain attenuation is identified. ORBITS: The visualized portion of the orbits demonstrate no acute abnormality. SINUSES: The visualized paranasal sinuses and mastoid air cells demonstrate no acute abnormality.  SOFT TISSUES/SKULL:  No acute abnormality of the visualized skull or soft tissues. CERVICAL BONES/ALIGNMENT: There is no acute fracture or subluxation. There is a stable mild 2 mm degenerative retrolisthesis of C3. No additional abnormal listhesis is identified. The vertebral bodies are otherwise normal in height and alignment. The posterior elements are intact and aligned. No destructive osseous lesion is seen. CERVICAL DEGENERATIVE CHANGES: There is stable mild multilevel spondylosis, most notable at C4 through C6. There is stable mild disc space loss and endplate sclerosis at A8-Z9, C4-C5, and C5-C6. There are mild disc osteophyte protrusions at C3-C4, C4-C5, and C5-C6, without significant central canal stenosis. However, the combination of multilevel bilateral uncovertebral and facet hypertrophy leads to varying degrees of multilevel bilateral neuroforaminal stenosis. CERVICAL SOFT TISSUES: The cervical soft tissues are unremarkable. The lung apices are clear. 1. No acute intracranial abnormality. 2. Findings suggestive of normal pressure hydrocephalus. Clinical correlation is advised. 3. No acute fracture or subluxation of the cervical spine. CT CERVICAL SPINE WO CONTRAST    Result Date: 9/29/2022  EXAMINATION: CT OF THE HEAD WITHOUT CONTRAST; CT OF THE CERVICAL SPINE WITHOUT CONTRAST 9/29/2022 2:42 pm; 9/29/2022 2:43 pm TECHNIQUE: CT of the head was performed without the administration of intravenous contrast. Automated exposure control, iterative reconstruction, and/or weight based adjustment of the mA/kV was utilized to reduce the radiation dose to as low as reasonably achievable.; CT of the cervical spine was performed without the administration of intravenous contrast. Multiplanar reformatted images are provided for review. Automated exposure control, iterative reconstruction, and/or weight based adjustment of the mA/kV was utilized to reduce the radiation dose to as low as reasonably achievable.  COMPARISON: 03/23/2021 HISTORY: ORDERING SYSTEM PROVIDED HISTORY: possible AMS, unknown fall TECHNOLOGIST PROVIDED HISTORY: Reason for exam:->possible AMS, unknown fall Has a \"code stroke\" or \"stroke alert\" been called? ->No Decision Support Exception - unselect if not a suspected or confirmed emergency medical condition->Emergency Medical Condition (MA) Reason for Exam: possible AMS, unknown fall Additional signs and symptoms: none Relevant Medical/Surgical History: none; ORDERING SYSTEM PROVIDED HISTORY: possible fall, possible ams TECHNOLOGIST PROVIDED HISTORY: Reason for exam:->possible fall, possible ams Decision Support Exception - unselect if not a suspected or confirmed emergency medical condition->Emergency Medical Condition (MA) Reason for Exam: possible fall, possible ams Additional signs and symptoms: none Relevant Medical/Surgical History: none FINDINGS: BRAIN/VENTRICLES: There is no acute intracranial hemorrhage, mass effect or midline shift. No abnormal extra-axial fluid collection. The gray-white differentiation is maintained without evidence of an acute infarct. There remains stable disproportionate ventricular dilatation in relation to cerebral atrophy, with progressive periventricular white matter hypoattenuation, which may suggest normal pressure hydrocephalus with associated transependymal CSF flow. There is stable chronic small vessel ischemic white matter disease. No focus of acute abnormal brain attenuation is identified. ORBITS: The visualized portion of the orbits demonstrate no acute abnormality. SINUSES: The visualized paranasal sinuses and mastoid air cells demonstrate no acute abnormality. SOFT TISSUES/SKULL:  No acute abnormality of the visualized skull or soft tissues. CERVICAL BONES/ALIGNMENT: There is no acute fracture or subluxation. There is a stable mild 2 mm degenerative retrolisthesis of C3. No additional abnormal listhesis is identified. The vertebral bodies are otherwise normal in height and alignment.   The posterior elements are intact and aligned. No destructive osseous lesion is seen. CERVICAL DEGENERATIVE CHANGES: There is stable mild multilevel spondylosis, most notable at C4 through C6. There is stable mild disc space loss and endplate sclerosis at E3-J4, C4-C5, and C5-C6. There are mild disc osteophyte protrusions at C3-C4, C4-C5, and C5-C6, without significant central canal stenosis. However, the combination of multilevel bilateral uncovertebral and facet hypertrophy leads to varying degrees of multilevel bilateral neuroforaminal stenosis. CERVICAL SOFT TISSUES: The cervical soft tissues are unremarkable. The lung apices are clear. 1. No acute intracranial abnormality. 2. Findings suggestive of normal pressure hydrocephalus. Clinical correlation is advised. 3. No acute fracture or subluxation of the cervical spine. CT LUMBAR SPINE WO CONTRAST    Result Date: 9/29/2022  EXAMINATION: CT OF THE LUMBAR SPINE WITHOUT CONTRAST  9/29/2022 TECHNIQUE: CT of the lumbar spine was performed without the administration of intravenous contrast. Multiplanar reformatted images are provided for review. Adjustment of mA and/or kV according to patient size was utilized. Automated exposure control, iterative reconstruction, and/or weight based adjustment of the mA/kV was utilized to reduce the radiation dose to as low as reasonably achievable. COMPARISON: CT from January 17, 2020 HISTORY: ORDERING SYSTEM PROVIDED HISTORY: pain, possible fall TECHNOLOGIST PROVIDED HISTORY: Reason for exam:->pain, possible fall Decision Support Exception - unselect if not a suspected or confirmed emergency medical condition->Emergency Medical Condition (MA) Reason for Exam: pain, possible fall Additional signs and symptoms: none Relevant Medical/Surgical History: none FINDINGS: BONES/ALIGNMENT: There is an fracture of the T12 vertebral body with estimated 35% vertebral body height loss. This is favored to be acute.  Minimal bony retropulsion at the posterosuperior endplate measuring 4 mm. No significant bony spinal canal stenosis however. Findings are new from January 17, 2020 and favored to be acute. Increased lucency at the superior endplate is favored to be related to the fracture with osteopenia. Pathologic fractures felt less likely, however attention on follow-up. Old right rib 11 fracture with bony callus. DEGENERATIVE CHANGES: Degenerative endplate changes most significant at L4-S1. Multilevel facet arthrosis. SOFT TISSUES/RETROPERITONEUM: No paraspinal mass is seen. Acute compression fracture of T12 with 35% vertebral body height. Mild 4 mm of bony retropulsion without significant bony spinal canal stenosis. Prominent degenerative disc disease most significant at L4-S1. CT ABDOMEN PELVIS W IV CONTRAST Additional Contrast? None    Result Date: 9/29/2022  EXAMINATION: CT OF THE ABDOMEN AND PELVIS WITH CONTRAST, 9/29/2022 6:41 pm TECHNIQUE: CT of the abdomen and pelvis was performed with the administration of intravenous contrast. Multiplanar reformatted images are provided for review. Automated exposure control, iterative reconstruction, and/or weight based adjustment of the mA/kV was utilized to reduce the radiation dose to as low as reasonably achievable. COMPARISON: Hip x-ray 09/29/2022 and CT abdomen and pelvis 01/17/2020. HISTORY: ORDERING SYSTEM PROVIDED HISTORY:  Severe sepsis. concern for abd source. TECHNOLOGIST PROVIDED HISTORY: Reason for Exam:  Severe sepsis. concern for abd source. Additional Contrast?  None Decision Support Exception - unselect if not a suspected or confirmed emergency medical condition->Emergency Medical Condition (MA) Reason for Exam:  Severe sepsis. concern for abd source. FINDINGS: Lower Chest:  Stable right lower lobe nodule given 2 years of stability, favored benign. Subpleural nodule left lung base as well also unchanged from prior examination.   Also given the 2 year stability, this is of likely pneumothorax. Old left-sided rib fracture is noted. There is also a left clavicular fracture that has incompletely healed, age indeterminate. 1. Emphysema without evidence of a focal lung infiltrate. 2. Left clavicular fracture, of uncertain age. 3. Old, healed left-sided rib fracture. XR HIP 2-3 VW W PELVIS LEFT    Result Date: 9/29/2022  EXAMINATION: ONE XRAY VIEW OF THE PELVIS AND TWO XRAY VIEWS LEFT HIP 9/29/2022 2:24 pm COMPARISON: 03/23/2021 pelvis radiographs HISTORY: ORDERING SYSTEM PROVIDED HISTORY: pain, limited ROM, possible fall TECHNOLOGIST PROVIDED HISTORY: Reason for exam:->pain, limited ROM, possible fall Reason for Exam: PAIN FINDINGS: There is an acute fracture of the left femoral neck with associated foreshortening and mild lateral displacement of the distal fracture fragment. There is mild varus angulation of the fracture components. The femoral head is well seated within the acetabulum. No additional fractures are identified. Degenerative changes of the lower lumbar spine. Brachytherapy seeds overlie the pelvis. Acute, mildly displaced left femoral neck fracture with associated foreshortening and mild lateral displacement of the distal fracture fragment.        Electronically signed by Chapincito Freeman MD on 9/30/2022 at 11:12 AM

## 2022-09-30 NOTE — OP NOTE
DATE OF PROCEDURE:  9/30/2022     PREOPERATIVE DIAGNOSIS:  Left femoral neck fracture. POSTOPERATIVE DIAGNOSIS:  Left femoral neck fracture. PROCEDURE:  Left hip hemiarthroplasty using Biomet Avenir complete size 5 pressfit femoral stem and size 53 - 3.5 bipolar femoral head. ATTENDING SURGEON:  Jamar Reynolds DO     FIRST ASSISTANT: PRAVIN Robledo    ANESTHESIA:  General.     ESTIMATED BLOOD LOSS:  150 mL. FLUIDS:  600 mL crystalloids and 500 mL albumin. INDICATIONS OF PROCEDURE:  The patient is a 68-year-old male who  sustained a fall landing onto his left hip. He was brought to  [facility] where he was diagnosed with a left  hip fracture. Given the fracture, I recommended surgical treatment. I  explained the risks, benefits and possible complications of the procedure  to the patient and after answering all of his questions, he consented to  undergo the above procedure. The hospitalist provided preoperative  clearance prior to proceeding with surgical treatment. DESCRIPTION OF PROCEDURE:  The patient was seen and evaluated in the  preoperative holding area where the left lower extremity was signed in his  presence. At this point, the patient was turned over to anesthesia team  and they transported him back to the operative suite. General  anesthesia was performed and once adequate anesthesia was obtained, he was  placed in the lateral decubitus position. The left lower extremity was  then prepped and draped in usual sterile fashion. Preoperative antibiotics were administered. At this point, a time-out was performed and all in attendance were in agreement. I marked out the planned surgical incision overlying the lateral aspect of  the left hip to perform a standard anterolateral approach to the left  hip. I then made incision in this location and carried sharp dissection  down to the level of the IT band.   I then incised longitudinally through  the IT band. I then continued dissection through the gluteus medius layer  exposing the hip capsule. I then opened up the anterior hip capsule with  the use of a scalpel. I then placed Walker retractors around the femoral  neck and continued dissection around the proximal femur for further  exposure. I then had my assistant externally rotate and adduct the leg exposing the  femoral neck fracture. I then used a reciprocating saw to perform the  femoral neck cut obliquely oriented from the tip of the greater trochanter  just proximal to the lesser trochanter. The bone fragment was then  removed. I then inserted corkscrew into the femoral head, which was then  extracted without difficulty. I then measured the size of the femoral head  to be a 53 mm femoral head. I then used pulse lavage with a sterile normal  saline with gentamicin to irrigate the acetabulum and evaluated the  cartilage on the acetabulum and found the cartilage to be well intact. I then had my assistant adduct and externally rotate the leg exposing the  proximal femur further. I then inserted a canal-finding reamer into the  femoral canal.  I then used a rat tail rasp to lateralize the femoral  Canal.   I then began broaching, started broaching to lateralize position. I then  continued sequentially increasing broaching up to a size 5, which fit snuggly within the proximal femur down to the appropriate height. This was  then selected for the final implant component. I then inserted the final  size 5 Biomet Avenir complete press-fit femoral stem, which was malleted in  place until it was firmly seated. I then inserted a trial size 53 - 3.5  bipolar head. I then had my assistant perform traction and internal  rotation. I then reduced the hip within the acetabulum.   I then had my  assistant run the hip through a full range of motion and found there to be  excellent stability throughout range of motion with equal leg lengths  compared to contralateral side. This was then selected for the final  implant component. I then had my assistant perform tracking and external  rotation of the dislocated hip and the acetabulum. The trial head was then  removed. I then irrigated the acetabulum further with pulse lavage. I  then inserted the final size 53 - 3.5 bipolar femoral head, which was  malleted in place until it was firmly seated. I then had my assistant  perform tracking and internal rotation. I then reduced the hip within the  acetabulum. My assistant then run the hip through a full range of motion  and we found there to be excellent stability throughout range of motion  with equal leg lengths compared to contralateral side. The joint was then irrigated further with pulse lavage. I then injected 1 g of TXA around the capsule. I then  re-approximated the hip capsule as well as the gluteus medius layer using  #5 Ethibond suture in a running fashion. I then closed the IT band using  #1 Vicryl suture in a figure-of-eight fashion. I then closed subcutaneous  tissues using 2-0 Vicryl suture followed by skin closure with stratafix and prineo. Adequate hemostasis was maintained at all times. I then applied a sterile  soft dressing to the left lower extremity and the patient was then  awakened from anesthesia and transported to PACU in stable condition. He  appeared to have tolerated the procedure well. PROGNOSIS:  At this point, he will be admitted back to the hospital for  postoperative pain control, rehabilitation and medical monitoring. Hospitalist will continue with medical management and physical therapy will  be consulted for gait training and he could be weightbearing as tolerated  on the left leg. He will receive antibiotic prophylaxis and DVT  prophylaxis during hospitalization.   Following his discharge, I will see  him back in the office in 3 weeks and will continue to  monitor his progress in the outpatient setting for resolution of his  symptoms. Kalen Frey was present for the entirety of the procedure and vital for the performance of the procedure. Kalen Frey assisted with positioning, prepping, draping of the patient before the procedure and instrument manipulation, extremity repositioning and camera operation during the procedure as well as wound closure, dressing application and brace application after the procedure. Please note that no intern, resident, or other hospital staff was available to assist during the surgery.        Sen 97, DO

## 2022-09-30 NOTE — CARE COORDINATION
CM met with pt to initiate discharge planning. Pt had surgery this morning and he was sleeping. Pt's daughter at bedside. Role of CM explained. Pt has insurance and is able to afford medication. Pt has PCP. Per daughter the pt lives alone and she and her brother are available if the pt needs help. He was independent PTA and does not use any DME. He drives and has reliable transportation. Daughter would like pt to go to [facility] for rehab. CM contact information given to pt. CM team available as needs arise. LANI sent PS to [facility], Liaison with [facility] asking her to follow this pt for possible admission when discharged. CM sent PS to Dr Jenna Haney asking him to order OT/PT evaluation when appropriate.

## 2022-09-30 NOTE — CONSULTS
Consult to Orthopedic Surgery  Consult performed by: Nichole Pino DO  Consult ordered by: Allen Jackman PA-C  Reason for consult: Left hip fracture  Assessment/Recommendations:     Left femoral neck fracture    I discussed with him today as well as his family members his x-ray findings. I explained to him that he does have a displaced fracture of the left hip. I did discuss conservative versus surgical options with them and after explaining all options they opted to proceed with surgical treatment. I discussed with him today performing left hip hemiarthroplasty. I explained risks, benefits, possible complications of the procedure and answered all questions for the patient. I explained postoperative rehabilitation protocol and expectations with the patient today. The patient understands and consents to the procedure. Patient will follow up with their primary care physician prior to surgical treatment for preoperative clearance. We will schedule surgery at soonest convenience. Continue bedrest.  Remain nonweightbearing/no lifting pushing or pulling on the injured extremity  Ice and elevate as needed. Pain medication as needed. Patient will be kept n.p.o. for planned surgical treatment this morning            Reagan Sales is a 73-year-old male who resides at home with his wife and apparently sustained a fall on Tuesday landing onto his left hip. Since that time is continued staying in a chair but has remained nonweightbearing on the left lower extremity. After couple of days, some other family members came to check on him and he was complaining of inability to bear weight on the left leg. He was brought to the ED, x-rays were obtained and he was diagnosed with a left hip fracture. He was subsequently admitted to the hospitalist for medical management and I was consulted for evaluation treatment of his left hip injury.     He states that since the injury he has continued to complain of deep, aching and throbbing pain globally in his left hip. He denies any prior history of pain or injury to the left hip, denies numbness or tingling in the left leg and denies any fever or chills. Review of Systems   Constitutional:  Negative for activity change, chills and fever. HENT:  Negative for congestion and drooling. Eyes:  Negative for redness. Respiratory:  Negative for chest tightness. Cardiovascular:  Negative for chest pain. Gastrointestinal:  Negative for abdominal pain. Endocrine: Negative for cold intolerance and heat intolerance. Musculoskeletal:  Positive for arthralgias, gait problem, joint swelling and myalgias. Negative for back pain. Skin:  Negative for color change, pallor, rash and wound. Neurological:  Negative for weakness and numbness. Psychiatric/Behavioral:  Negative for confusion. Past Medical History:   Diagnosis Date    Anxiety     Cancer [facility])     prostate    COPD (chronic obstructive pulmonary disease) (HCC)     Glaucoma     Hyperlipidemia     Hypertension      No current facility-administered medications on file prior to encounter.      Current Outpatient Medications on File Prior to Encounter   Medication Sig Dispense Refill    aspirin EC 81 MG EC tablet Take 1 tablet by mouth daily Resume after 2 weeks (02/03/2020) (Patient not taking: Reported on 9/29/2022) 30 tablet 1    folic acid (FOLVITE) 1 MG tablet Take 1 tablet by mouth daily (Patient not taking: Reported on 9/29/2022) 30 tablet 3    vitamin B-1 100 MG tablet Take 1 tablet by mouth daily (Patient not taking: Reported on 9/29/2022) 30 tablet 3    pantoprazole (PROTONIX) 40 MG tablet Take 1 tablet by mouth every morning (before breakfast) (Patient not taking: Reported on 9/29/2022) 30 tablet 1    ferrous sulfate (FE TABS) 325 (65 Fe) MG EC tablet Take 1 tablet by mouth 2 times daily (Patient not taking: Reported on 9/29/2022) 90 tablet 3    brimonidine-timolol (COMBIGAN) 0.2-0.5 % ophthalmic solution Place 1 drop into both eyes every 12 hours      tiotropium (SPIRIVA RESPIMAT) 2.5 MCG/ACT AERS inhaler Inhale 1 puff into the lungs daily      Nutritional Supplements (JUICE PLUS FIBRE PO) Take 2 each by mouth 2 times daily      metoprolol tartrate (LOPRESSOR) 50 MG tablet Take 1 tablet by mouth 2 times daily 60 tablet 1    nicotine (NICODERM CQ) 14 MG/24HR Place 1 patch onto the skin daily (Patient not taking: Reported on 9/29/2022) 30 patch 1    travoprost, benzalkonium, (TRAVATAN) 0.004 % ophthalmic solution Place 1 drop into the left eye nightly       brinzolamide (AZOPT) 1 % ophthalmic suspension Place 1 drop into the left eye 3 times daily       ALPRAZolam (XANAX) 0.25 MG tablet Take 0.25 mg by mouth every 6 hours as needed for Sleep or Anxiety      atorvastatin (LIPITOR) 40 MG tablet Take 40 mg by mouth daily      fenofibrate (TRICOR) 145 MG tablet Take 145 mg by mouth daily      diphenoxylate-atropine (LOMOTIL) 2.5-0.025 MG per tablet Take 2 tablets by mouth daily as needed for Diarrhea      losartan (COZAAR) 25 MG tablet Take 25 mg by mouth daily      NIFEdipine (PROCARDIA XL) 60 MG extended release tablet Take 60 mg by mouth daily       No Known Allergies  Past Surgical History:   Procedure Laterality Date    COLONOSCOPY      COLONOSCOPY N/A 1/18/2020    COLONOSCOPY CONTROL HEMORRHAGE/BIOPSY performed by Alka Escudero MD at [facility]  2011    radiation seeds implanted by Dr Leandra Davis History     Tobacco Use    Smoking status: Every Day     Packs/day: 1.50     Types: Cigarettes    Smokeless tobacco: Never   Vaping Use    Vaping Use: Never used   Substance Use Topics    Alcohol use: Yes     Alcohol/week: 4.0 - 5.0 standard drinks     Types: 4 - 5 Shots of liquor per week     Comment: mixed drinks per day, whiskey    Drug use: Never     History reviewed. No pertinent family history.     Right Knee Exam     Muscle Strength   The patient has normal right knee strength. Tenderness   The patient is experiencing no tenderness. Range of Motion   The patient has normal right knee ROM. Other   Erythema: absent  Sensation: normal  Pulse: present  Swelling: none  Effusion: no effusion present      Left Knee Exam     Tenderness   The patient is experiencing no tenderness. Other   Erythema: absent  Sensation: normal  Pulse: present  Swelling: none  Effusion: no effusion present      Right Hip Exam     Tenderness   The patient is experiencing no tenderness. Range of Motion   The patient has normal right hip ROM. Muscle Strength   The patient has normal right hip strength. Other   Erythema: absent  Sensation: normal  Pulse: present      Left Hip Exam     Tenderness   The patient is experiencing tenderness in the anterior, greater trochanter, lateral and posterior. Other   Erythema: absent  Sensation: normal  Pulse: present    Comments:  Left hip - Skin intact with no erythema, ecchymosis or lacerations present. Full range of motion not assessed due to recent injury. Intact sensation motor function to all nerve distributions of the extremity. +2 DP  Compartment soft. The left leg is held in a position of shortening and external rotation compared to contralateral side. Severe pain with attempted range of motion of the left hip.             BP (!) 140/64   Pulse 74   Temp 97.9 °F (36.6 °C) (Temporal)   Resp 20   Ht 5' 8\" (1.727 m)   Wt 134 lb 8 oz (61 kg)   SpO2 93%   BMI 20.45 kg/m²         XR HIP 2-3 VW W PELVIS LEFT    Result Date: 9/29/2022  EXAMINATION: ONE XRAY VIEW OF THE PELVIS AND TWO XRAY VIEWS LEFT HIP 9/29/2022 2:24 pm COMPARISON: 03/23/2021 pelvis radiographs HISTORY: ORDERING SYSTEM PROVIDED HISTORY: pain, limited ROM, possible fall TECHNOLOGIST PROVIDED HISTORY: Reason for exam:->pain, limited ROM, possible fall Reason for Exam: PAIN FINDINGS: There is an acute fracture of the left femoral neck with associated foreshortening and mild lateral displacement of the distal fracture fragment. There is mild varus angulation of the fracture components. The femoral head is well seated within the acetabulum. No additional fractures are identified. Degenerative changes of the lower lumbar spine. Brachytherapy seeds overlie the pelvis. Acute, mildly displaced left femoral neck fracture with associated foreshortening and mild lateral displacement of the distal fracture fragment.

## 2022-09-30 NOTE — PROGRESS NOTES
[address]  consulted by Dr. Meenu Fonseca for monitoring and adjustment. Indication for treatment: Sepsis of unknown etiology  Goal trough: [] 10-15 mcg/mL or [x] 15-20 mcg/ml  AUC/ANANT: [] <500 or [x] 400-600    Pertinent Laboratory Values:   Temp Readings from Last 3 Encounters:   09/29/22 98.6 °F (37 °C) (Oral)   03/23/21 98.5 °F (36.9 °C) (Oral)   01/20/20 98.4 °F (36.9 °C) (Oral)     Recent Labs     09/29/22  1328   WBC 16.5*     Recent Labs     09/29/22  1328   BUN 15   CREATININE 0.5*     Estimated Creatinine Clearance: 112 mL/min (A) (based on SCr of 0.5 mg/dL (L)). Intake/Output Summary (Last 24 hours) at 9/29/2022 2355  Last data filed at 9/29/2022 2000  Gross per 24 hour   Intake 4046 ml   Output 300 ml   Net 3746 ml       Pertinent Cultures:  Date    Source    Results  9/29   Covid-19   Negative  9/29   Blood x 2   Ordered    Vancomycin level:   TROUGH:  No results for input(s): VANCOTROUGH in the last 72 hours. RANDOM:  No results for input(s): VANCORANDOM in the last 72 hours. Assessment:  SCr, BUN, and urine output: stable renal function/Scr at baseline  Day(s) of therapy: 1 of 7  Vancomycin concentration: Pending    Plan:  Received vancomycin 1500 mg x 1 in the ED  Start vancomycin 1000 mg IVPB q12h  Predicted trough of 13.5 and AUC: 470 at steady-state  Pharmacy will continue to monitor patient and adjust therapy as indicated    VANCOMYCIN CONCENTRATION SCHEDULED FOR 10/01 @0500    Thank you for the consult.   CLAIR Johnson [facility]  9/29/2022 11:55 PM

## 2022-09-30 NOTE — BRIEF OP NOTE
Brief Postoperative Note      Patient: Cherrie Garcai  YOB: 1950  MRN: [id number]    Date of Procedure: 2022    Pre-Op Diagnosis: Left Hip Fracture    Post-Op Diagnosis: Same       Procedure(s):  HIP HEMIARTHROPLASTY    Surgeon(s):  Rosa Donaldson DO    Assistant:  Physician Assistant: Nichol VaughanrSHANNA    Anesthesia: General    Estimated Blood Loss (mL): 050 mL    Complications: None    Specimens:   * No specimens in log *    Implants:  Implant Name Type Inv. Item Serial No.  Lot No. LRB No. Used Action   BIPOLAR SHELL 53MM OD - [id number]  BIPOLAR SHELL 53MM OD  [employer] [id number] Left 1 Implanted   BIPOLAR LINER 53/54/55MM OD X 28MM ID - [id number]  BIPOLAR LINER 53/54/55MM OD X 28MM ID  [employer] [id number] Left 1 Implanted   IMPLANT FEMORAL HEAD ZB 12/ COCR HD - [id number]  IMPLANT FEMORAL HEAD ZB 12/14 COCR HD  [facility] [id number] Left 1 Implanted   AVENIR COMPLETE STANDARD COLLARED SIZE 5 - [id number]  Avenir Complete Standard Collared Size 5  [employer] [id number] Left 1 Implanted         Drains:   Urinary Catheter 22 (Active)   $ Urethral catheter insertion $ Not inserted for procedure 22   Catheter Indications Urinary retention (acute or chronic), continuous bladder irrigation or bladder outlet obstruction 22   Site Assessment Pink 22   Urine Color Yellow 22   Urine Appearance Cloudy 22   Urine Odor Malodorous 22 1821   Collection Container Standard 22   Securement Method Securing device (Describe) 22   Catheter Care Completed Yes 22   Catheter Best Practices  Drainage tube clipped to bed;Catheter secured to thigh; Bag below bladder;Bag not on floor;Drainage bag less than half full 22   Status Draining 22   Output (mL) 300 mL 22       Findings: L hip fx    Electronically signed by Sen 97, DO on 9/30/2022 at 11:49 AM

## 2022-09-30 NOTE — PROGRESS NOTES
Comprehensive Nutrition Assessment    Type and Reason for Visit:  Initial (Low BMI for age)    Nutrition Recommendations/Plan:   Start Standard High Calorie/High Protein oral nutrition supplements TID   Recommend MVI supplementation   Will follow up for full NFPE assessment      Malnutrition Assessment:  Malnutrition Status: At risk for malnutrition (Comment) (09/30/22 1242)    Context:  Social/Environmental Circumstances       Nutrition Assessment:    Admitted with severe sepsis, alcohol abuse, T12 compression fracture, and hip fracture. Pt s/p hip hemiarthroplasty this AM, NPO this AM. Plans to advance to regular diet. Poor intake PTA related to ETOH abuse. Per neurosurgery notes, \"His daughter states that she has not noticed any significant dementia type symptoms but does state that he is a daily drinker and smokes heavily and she has noticed an overall decline in his functional status over the past 6 months. \" Pt is underweight, will monitor as high nutrition risk. Nutrition Related Findings:    Hx of drinking 35 alcoholic drinks per week, ~4-6 drinks per day based on office visit notes. Smokes 1.5 ppd. Wound Type: Surgical Incision       Current Nutrition Intake & Therapies:    Average Meal Intake: NPO (for sx this AM)  Average Supplements Intake: NPO  No diet orders on file    Anthropometric Measures:  Height: 5' 8\" (172.7 cm)  Ideal Body Weight (IBW): 154 lbs (70 kg)    Admission Body Weight: 134 lb 7.7 oz (61 kg)  Current Body Weight: 134 lb 7.7 oz (61 kg), 87.3 % IBW.  Weight Source: Bed Scale  Current BMI (kg/m2): 20.5  Usual Body Weight: 132 lb 12.8 oz (60.2 kg) (March 2022; hx of weighing 150-160# in 2021)  % Weight Change (Calculated): 1.3  Weight Adjustment For: No Adjustment  BMI Categories: Underweight (BMI less than 22) age over 72    Estimated Daily Nutrient Needs:  Energy Requirements Based On: Kcal/kg  Weight Used for Energy Requirements: Current  Energy (kcal/day): 2142-2772 (30-35 kcals/kg)  Weight Used for Protein Requirements: Ideal  Protein (g/day): 84-98 (1.2-1.4 g/kg)  Method Used for Fluid Requirements: 1 ml/kcal  Fluid (ml/day): 9509-8065    Nutrition Diagnosis:   Increased nutrient needs related to acute injury/trauma (s/p hip hemiarthroplasty) as evidenced by wounds  Predicted inadequate energy intake related to impaired nutrient utilization (s/s alcoholism) as evidenced by poor intake prior to admission    Nutrition Interventions:   Food and/or Nutrient Delivery: Continue Current Diet, Start Oral Nutrition Supplement  Nutrition Education/Counseling: Education needed  Coordination of Nutrition Care: Continue to monitor while inpatient, Feeding Assistance/Environment Change, Coordination of Care       Goals:  Previous Goal Met: Progressing toward Goal(s)  Goals: PO intake 50% or greater       Nutrition Monitoring and Evaluation:   Behavioral-Environmental Outcomes: None Identified  Food/Nutrient Intake Outcomes: Diet Advancement/Tolerance, Food and Nutrient Intake, Supplement Intake  Physical Signs/Symptoms Outcomes: Biochemical Data, GI Status, Meal Time Behavior, Skin, Weight, Nutrition Focused Physical Findings    Discharge Planning:    Continue current diet, Continue Oral Nutrition Supplement (+MVI)     Fannie Segura RD, LD  Contact: [phone]

## 2022-09-30 NOTE — ANESTHESIA PRE PROCEDURE
Department of Anesthesiology  Preprocedure Note       Name:  Kristine Pitts   Age:  74 y.o.  :  1950                                          MRN:  [id number]         Date:  2022      Surgeon: Keyona De Leon):  Nanci Harrison DO    Procedure: Procedure(s):  HIP HEMIARTHROPLASTY    Medications prior to admission:   Prior to Admission medications    Medication Sig Start Date End Date Taking?  Authorizing Provider   aspirin EC 81 MG EC tablet Take 1 tablet by mouth daily Resume after 2 weeks (2020)  Patient not taking: Reported on 2022   Cierra Flanagan MD   folic acid (FOLVITE) 1 MG tablet Take 1 tablet by mouth daily  Patient not taking: Reported on 2022   Cierra Flanagan MD   vitamin B-1 100 MG tablet Take 1 tablet by mouth daily  Patient not taking: Reported on 2022   Cierra Flanagan MD   pantoprazole (PROTONIX) 40 MG tablet Take 1 tablet by mouth every morning (before breakfast)  Patient not taking: Reported on 2022   Cierra Flanagan MD   ferrous sulfate (FE TABS) 325 (65 Fe) MG EC tablet Take 1 tablet by mouth 2 times daily  Patient not taking: Reported on 2022   Cierra Flanagan MD   brimonidine-timolol (COMBIGAN) 0.2-0.5 % ophthalmic solution Place 1 drop into both eyes every 12 hours    Historical Provider, MD   tiotropium (SPIRIVA RESPIMAT) 2.5 MCG/ACT AERS inhaler Inhale 1 puff into the lungs daily    Historical Provider, MD   Nutritional Supplements (JUICE PLUS FIBRE PO) Take 2 each by mouth 2 times daily    Historical Provider, MD   metoprolol tartrate (LOPRESSOR) 50 MG tablet Take 1 tablet by mouth 2 times daily 16   Roverto Moser MD   nicotine (NICODERM CQ) 14 MG/24HR Place 1 patch onto the skin daily  Patient not taking: Reported on 2022   Roverto Moser MD   travoprost, benzalkonium, (TRAVATAN) 0.004 % ophthalmic solution Place 1 drop into the left eye nightly     Historical Provider, MD brinzolamide (AZOPT) 1 % ophthalmic suspension Place 1 drop into the left eye 3 times daily     Historical Provider, MD   ALPRAZolam (XANAX) 0.25 MG tablet Take 0.25 mg by mouth every 6 hours as needed for Sleep or Anxiety    Historical Provider, MD   atorvastatin (LIPITOR) 40 MG tablet Take 40 mg by mouth daily    Historical Provider, MD   fenofibrate (TRICOR) 145 MG tablet Take 145 mg by mouth daily    Historical Provider, MD   diphenoxylate-atropine (LOMOTIL) 2.5-0.025 MG per tablet Take 2 tablets by mouth daily as needed for Diarrhea    Historical Provider, MD   losartan (COZAAR) 25 MG tablet Take 25 mg by mouth daily    Historical Provider, MD   NIFEdipine (PROCARDIA XL) 60 MG extended release tablet Take 60 mg by mouth daily    Historical Provider, MD       Current medications:    Current Facility-Administered Medications   Medication Dose Route Frequency Provider Last Rate Last Admin    sodium chloride flush 0.9 % injection 5-40 mL  5-40 mL IntraVENous 2 times per day Leila Massey MD   10 mL at 09/29/22 2043    sodium chloride flush 0.9 % injection 5-40 mL  5-40 mL IntraVENous PRN Leila Massey MD        0.9 % sodium chloride infusion   IntraVENous PRN Leila Massey MD        LORazepam (ATIVAN) tablet 1 mg  1 mg Oral Q1H PRN Leila Masesy MD        Or    LORazepam (ATIVAN) injection 1 mg  1 mg IntraVENous Q1H PRVALERIA Massey MD        Or    LORazepam (ATIVAN) tablet 2 mg  2 mg Oral Q1H PRVALERIA Massey MD        Or    LORazepam (ATIVAN) injection 2 mg  2 mg IntraVENous Q1H PRVALERIA Massey MD   2 mg at 09/30/22 0105    Or    LORazepam (ATIVAN) tablet 3 mg  3 mg Oral Q1H PRVALERIA Massey MD        Or    LORazepam (ATIVAN) injection 3 mg  3 mg IntraVENous Q1H PRVALERIA Massey MD        Or    LORazepam (ATIVAN) tablet 4 mg  4 mg Oral Q1H PRN Leila Massey MD        Or    LORazepam (ATIVAN) injection 4 mg  4 mg IntraVENous Q1H PRN Leila Massey MD        aspirin EC tablet 81 mg  81 mg Oral Daily Elise Atkins MD        atorvastatin (LIPITOR) tablet 40 mg  40 mg Oral Daily Elise Atkins MD        folic acid (FOLVITE) tablet 1 mg  1 mg Oral Daily Elise Atkins MD        metoprolol tartrate (LOPRESSOR) tablet 50 mg  50 mg Oral BID Elise Atkins MD   50 mg at 09/30/22 0000    nicotine (NICODERM CQ) 14 MG/24HR 1 patch  1 patch TransDERmal Daily Elise Atkins MD        pantoprazole (PROTONIX) tablet 40 mg  40 mg Oral QAM AC Elise Atkins MD   40 mg at 09/30/22 0649    tiotropium (SPIRIVA RESPIMAT) 2.5 MCG/ACT inhaler 1 puff  1 puff Inhalation Daily Elise Atkins MD        thiamine tablet 100 mg  100 mg Oral Daily Elise Atkins MD        cefepime (MAXIPIME) 2000 mg IVPB minibag  2,000 mg IntraVENous Q12H Elise Atkins MD 12.5 mL/hr at 09/30/22 0608 2,000 mg at 09/30/22 7604    sodium chloride flush 0.9 % injection 5-40 mL  5-40 mL IntraVENous 2 times per day Elise Atkins MD        sodium chloride flush 0.9 % injection 5-40 mL  5-40 mL IntraVENous PRN Elise Atkins MD        0.9 % sodium chloride infusion   IntraVENous PRN Elise Atkins MD   Stopped at 09/30/22 8935    enoxaparin (LOVENOX) injection 40 mg  40 mg SubCUTAneous Daily Elise Atkins MD        ondansetron (ZOFRAN-ODT) disintegrating tablet 4 mg  4 mg Oral Q8H PRN Elise Atkins MD        Or    ondansetron [facility]) injection 4 mg  4 mg IntraVENous Q6H PRN Elise Atkins MD        polyethylene glycol Fredna Gertrude) packet 17 g  17 g Oral Daily PRVALERIA Atkins MD        acetaminophen (TYLENOL) tablet 650 mg  650 mg Oral Q6H PRN Elise Atkins MD        Or    acetaminophen (TYLENOL) suppository 650 mg  650 mg Rectal Q6H PRN Elise Atkins MD        vancomycin (VANCOCIN) 1,000 mg in dextrose 5 % 250 mL IVPB ([id number])  1,000 mg IntraVENous Q12H Elise Atkins MD   Stopped at 09/30/22 0703       Allergies:  No Known Allergies    Problem List:    Patient Active Problem List Diagnosis Code    NSTEMI (non-ST elevated myocardial infarction) ([facility]) I21.4    COPD (chronic obstructive pulmonary disease) ([facility]) J44.9    Essential hypertension I10    Hyperlipidemia E78.5    Glaucoma H40.9    Anxiety F41.9    Diverticulosis of colon with hemorrhage K57.31    Severe sepsis ([facility]) A41.9, R65.20       Past Medical History:        Diagnosis Date    Anxiety     [facility] ([facility] [address]     prostate    [facility]     Glaucoma     Hyperlipidemia     Hypertension        Past Surgical History:        Procedure Laterality Date    COLONOSCOPY      COLONOSCOPY N/A 1/18/2020    COLONOSCOPY CONTROL HEMORRHAGE/BIOPSY performed by Diane Gipson MD at [address] [facility]  2011    radiation seeds implanted by Dr Isabel Pierce History:    Social History     Tobacco Use    Smoking status: Every Day     Packs/day: 1.50     Types: Cigarettes    Smokeless tobacco: Never   Substance Use Topics    Alcohol use: Yes     Alcohol/week: 4.0 - 5.0 standard drinks     Types: 4 - 5 Shots of liquor per week     Comment: mixed drinks per day, whiskey                                Ready to quit: Not Answered  Counseling given: Not Answered      Vital Signs (Current):   Vitals:    09/30/22 0000 09/30/22 0030 09/30/22 0130 09/30/22 0400   BP: (!) 150/59 (!) 150/59 (!) 135/57 (!) 135/57   Pulse: 93 94 76 76   Resp:    23   Temp:    98.5 °F (36.9 °C)   TempSrc:    Oral   SpO2:       Weight:       Height:                                                  BP Readings from Last 3 Encounters:   09/30/22 (!) 135/57   03/23/21 (!) 150/58   01/20/20 124/60       NPO Status:                                                                                 BMI:   Wt Readings from Last 3 Encounters:   09/29/22 134 lb 8 oz (61 kg)   03/23/21 155 lb (70.3 kg)   01/20/20 152 lb (68.9 kg)     Body mass index is 20.45 kg/m².     CBC:   Lab Results   Component Value Date/Time    WBC 16.5 09/29/2022 01:28 PM    RBC 4.06 09/29/2022 01:28 PM    HGB 12.6 09/29/2022 01:28 PM    HCT 37.0 09/29/2022 01:28 PM    MCV 91.1 09/29/2022 01:28 PM    RDW 12.3 09/29/2022 01:28 PM     09/29/2022 01:28 PM       CMP:   Lab Results   Component Value Date/Time     09/29/2022 01:28 PM    K 4.3 09/29/2022 01:28 PM    CL 98 09/29/2022 01:28 PM    CO2 23 09/29/2022 01:28 PM    BUN 15 09/29/2022 01:28 PM    CREATININE 0.5 09/29/2022 01:28 PM    GFRAA >60 09/29/2022 01:28 PM    LABGLOM >60 09/29/2022 01:28 PM    GLUCOSE 136 09/29/2022 01:28 PM    PROT 6.1 09/29/2022 01:28 PM    CALCIUM 8.9 09/29/2022 01:28 PM    BILITOT 0.5 09/29/2022 01:28 PM    ALKPHOS 60 09/29/2022 01:28 PM    AST 22 09/29/2022 01:28 PM    ALT 15 09/29/2022 01:28 PM       POC Tests: No results for input(s): POCGLU, POCNA, POCK, POCCL, POCBUN, POCHEMO, POCHCT in the last 72 hours.     Coags:   Lab Results   Component Value Date/Time    PROTIME 11.1 01/17/2020 10:11 AM    PROTIME 9.8 09/28/2011 03:05 PM    INR 0.92 01/17/2020 10:11 AM    APTT 25.8 09/12/2016 02:55 PM       HCG (If Applicable): No results found for: PREGTESTUR, PREGSERUM, HCG, HCGQUANT     ABGs: No results found for: PHART, PO2ART, UUS7SRQ, TWY4IUY, BEART, [id number]     Type & Screen (If Applicable):  No results found for: LABABO, LABRH    Drug/Infectious Status (If Applicable):  No results found for: HIV, HEPCAB    COVID-19 Screening (If Applicable):   Lab Results   Component Value Date/Time    COVID19 NOT DETECTED 09/29/2022 03:25 PM           Anesthesia Evaluation  Patient summary reviewed no history of anesthetic complications:   Airway: Mallampati: II  TM distance: >3 FB   Neck ROM: full  Mouth opening: > = 3 FB   Dental:    (+) poor dentition      Pulmonary:   (+) COPD:  current smoker                           Cardiovascular:  Exercise tolerance: poor (<4 METS),   (+) hypertension:, past MI:, hyperlipidemia         Beta Blocker:  Dose within 24 Hrs         Neuro/Psych:   (+) psychiatric history (etoh abuse):depression/anxiety             GI/Hepatic/Renal:             Endo/Other:    (+) blood dyscrasia: anemia and anticoagulation therapy:., electrolyte abnormalities, malignancy/cancer. Abdominal:             Vascular: negative vascular ROS. Other Findings:           Anesthesia Plan      general     ASA 3       Induction: intravenous. MIPS: Postoperative opioids intended and Prophylactic antiemetics administered. Anesthetic plan and risks discussed with patient. Plan discussed with CRNA. Pre Anesthesia Evaluation complete. Anesthesia plan, risks, benefits, alternatives, and personal involved discussed with patient. Patients and/or legal guardian verbalized an understanding  and agreed to proceed.   Rosalie Mishra DO  9/30/2022

## 2022-10-01 PROBLEM — E43 SEVERE MALNUTRITION (HCC): Status: ACTIVE | Noted: 2022-01-01

## 2022-10-01 PROBLEM — E44.0 MODERATE MALNUTRITION (HCC): Status: ACTIVE | Noted: 2022-01-01

## 2022-10-01 NOTE — PROGRESS NOTES
Neurosurgery Progress Note  10/1/2022 8:43 AM        Assessment and Plan:   Acute T12 compression fracture- concerns for fracture to be chronic per family. He has chronic pain in his back at baseline. He is not a good historian at the moment. He tells me that he does have mid/low back pain but that he always has back pain. He is not tender midline when I palpate at T12 but tell me that his back feels sore with laying in the bed. MRI of the lumbar spine will be obtained. Patient ok to work with therapy while maintaining thoracolumbar precautions with log rolling and minimally bending/twisting of spine. Concern for NPH- ventriculomegaly noted on CT head 9/29/22, comparable in size on scan in March 2021. Discussed with daughter yesterday, patient is confused at times now but prior to early this week he was driving, doing his own household chores. He did not have unsteadiness or urinary incontinence. Low suspicion for NPH, but did explain that further work-up can be done outpatient for this should be begin to display more consistent symptoms. Alcohol use- daily drinker per family, on CIWA scale. Could be contributing to his ventriculomegaly by way of generalized cerebral atrophy. Sx fixation of left femoral neck- POD 1 today    Supervising physician: Fabiano Dodd. Aubrey Guzman MD.  Dr. Aubrey Guzman was readily and continuously available by phone for direct consultation regarding the care of this patient. Subjective:   Admit Date: 9/29/2022  PCP: Lexis Mcmahon MD    Patient sitting up in bed eating breakfast.  He tells me that he has no pain. When I inquire about his back he tells me that his back does feel sore and points to T12-L1 as a source of soreness. When I palpate midline he ultimately does not have any pain. He does report difficulty rolling in bed because of his back pain. He is able to understand and follow commands more briskly today compared to yesterday.     Data:   Scheduled Meds:   melatonin  6 mg Oral Nightly    sodium chloride flush  5-40 mL IntraVENous 2 times per day    aspirin  325 mg Oral BID    guaiFENesin  600 mg Oral BID    sodium chloride (Inhalant)  4 mL Nebulization 4x Daily    atorvastatin  40 mg Oral Daily    folic acid  1 mg Oral Daily    metoprolol tartrate  50 mg Oral BID    nicotine  1 patch TransDERmal Daily    pantoprazole  40 mg Oral QAM AC    tiotropium  1 puff Inhalation Daily    thiamine  100 mg Oral Daily    cefepime  2,000 mg IntraVENous Q12H    enoxaparin  40 mg SubCUTAneous Daily    vancomycin  1,000 mg IntraVENous Q12H         I/O last 3 completed shifts: In: 2860 [P.O.:100; I.V.:2260; IV Piggyback:500]  Out: 1250 [Urine:950; Blood:300]  No intake/output data recorded.     Intake/Output Summary (Last 24 hours) at 10/1/2022 0843  Last data filed at 10/1/2022 0400  Gross per 24 hour   Intake 2860 ml   Output 950 ml   Net 1910 ml     CULTURE results: Invalid input(s): BLOOD CULTURE,  URINE CULTURE, SURGICAL CULTURE  CBC:   Recent Labs     09/29/22  1328 09/30/22  1240   WBC 16.5* 16.2*   HGB 12.6* 10.4*   * 393     BMP:    Recent Labs     09/29/22  1328 09/30/22  1240   * 126*   K 4.3 4.1   CL 98* 93*   CO2 23 24   BUN 15 9   CREATININE 0.5* 0.4*   GLUCOSE 136* 134*     Hepatic:   Recent Labs     09/29/22  1328   AST 22   ALT 15   BILITOT 0.5   ALKPHOS 60         IMAGING: available xray, CT, and MRI results reviewed    Objective:   Vitals: BP (!) 161/66   Pulse 70   Temp 97.5 °F (36.4 °C)   Resp 17   Ht 5' 8\" (1.727 m)   Wt 131 lb 14.4 oz (59.8 kg)   SpO2 100%   BMI 20.06 kg/m²   General appearance: Alert, sitting up in bed feeding himself breakfast, no distress  HEENT: Normocephalic, atraumatic, EOM intact  Neurologic: Mental status: alert to self, birthday, current year, told me he was at [facility] and did not know what kind of surgery he had yesterday, speech clear and coherent, no facial droop, follows commands briskly, sensation intact in all extremities  Extremities: Bilateral upper extremities able to resist gravity, bilateral lower extremities 5/5 throughout except for left iliopsoas which is 2/5, bilateral patellar DTR 2+  Neurosurgical Incision: none  Drains: none      Electronically signed by Linda Laird PA-C on 10/1/2022 at 8:43 AM

## 2022-10-01 NOTE — PROGRESS NOTES
Kiersten José is a 74 y.o. male patient. 1. Fall, initial encounter    2. Closed fracture of neck of left femur, initial encounter ([facility] [address]    3. T12 compression fracture, initial encounter ([facility]    4. Normal pressure hydrocephalus (HCC)    5. Alcohol use    6. Severe sepsis [facility])      Past Medical History:   Diagnosis Date    Anxiety     Cancer [facility])     prostate    COPD (chronic obstructive pulmonary disease) (HCC)     Glaucoma     Hyperlipidemia     Hypertension      No past surgical history pertinent negatives on file. Scheduled Meds:   melatonin  6 mg Oral Nightly    sodium chloride flush  5-40 mL IntraVENous 2 times per day    aspirin  325 mg Oral BID    guaiFENesin  600 mg Oral BID    sodium chloride (Inhalant)  4 mL Nebulization 4x Daily    atorvastatin  40 mg Oral Daily    folic acid  1 mg Oral Daily    metoprolol tartrate  50 mg Oral BID    nicotine  1 patch TransDERmal Daily    pantoprazole  40 mg Oral QAM AC    tiotropium  1 puff Inhalation Daily    thiamine  100 mg Oral Daily    cefepime  2,000 mg IntraVENous Q12H    enoxaparin  40 mg SubCUTAneous Daily    vancomycin  1,000 mg IntraVENous Q12H     Continuous Infusions:   lactated ringers 1,000 mL (10/01/22 0610)    sodium chloride       PRN Meds:sodium chloride flush, sodium chloride, acetaminophen, oxyCODONE **OR** oxyCODONE, HYDROmorphone **OR** HYDROmorphone, LORazepam **OR** LORazepam **OR** LORazepam **OR** LORazepam **OR** LORazepam **OR** LORazepam **OR** LORazepam **OR** LORazepam, ondansetron **OR** ondansetron, polyethylene glycol, [DISCONTINUED] acetaminophen **OR** acetaminophen    No Known Allergies  Principal Problem:    Severe sepsis ([facility] [address]  Active Problems:    Fall    Moderate malnutrition ([facility] [address]  Resolved Problems:    * No resolved hospital problems. *    Blood pressure 138/72, pulse 82, temperature 97.4 °F (36.3 °C), temperature source Oral, resp.  rate 20, height 5' 7\" (1.702 m), weight 132 lb 9.6 oz (60.1 kg), SpO2 98 %.    Subjective  Patient seen and examined, resting in bed comfortably, pain controlled, no new complaints. More alert today, feeling much better today. Objective  LLE - Dressing removed, incision clean, dry, intact, no calf TTP, compartments soft, neurovascularly intact distally. Mild pain in the left hip and groin with range of motion. Assessment & Plan  POD #1 left hip hemiarthroplasty , Doing well postoperatively    Continue weight bearing as tolerated. Continue range of motion exercises. Pain control. DVT prophylaxis. Continue to ice and elevate. Continue PT/OT. Discharge planning.     Sen [address], DO  10/1/2022

## 2022-10-01 NOTE — PROGRESS NOTES
Comprehensive Nutrition Assessment    Type and Reason for Visit:  Reassess    Nutrition Recommendations/Plan:    Continue standard oral nutrition supplement  Add frozen oral nutrition supplements  4 standard oral nutrition supplements will provide ~1400 kcal and 80 g protein if pt consumes at 100%  Please document all po intake  Will monitor po intake, weight trends, poc     Malnutrition Assessment:  Malnutrition Status: Moderate malnutrition (10/01/22 1017)    Context:  Chronic Illness       Nutrition Assessment:    visited pt at bedside, pt on regular diet with standard oral nutrition supplements ordered, no po intake documented since admission, pt reports UBW ~145# in June 2022, reports weight loss, unable to verify weight loss with comprehensive weight hx, reports decreased appetite for greater than 1 month, denies any chewing or swallowing difficulty, pt states that he likes Ensure supplements, pt also agreeable to trial frozen oral nutrition supplements, encouraged po intake/supplement consumption, NFPE completed, pt meets criteria for malnutrition, will follow at high nutrition risk    Nutrition Related Findings:    Wound Type: Surgical Incision       Current Nutrition Intake & Therapies:    Average Meal Intake: Unable to assess  Average Supplements Intake: Unable to assess  ADULT DIET; Regular  ADULT ORAL NUTRITION SUPPLEMENT; Breakfast, Lunch, Dinner; Standard High Calorie/High Protein Oral Supplement    Anthropometric Measures:  Height: 5' 7\" (170.2 cm)  Ideal Body Weight (IBW): 148 lbs (67 kg)    Admission Body Weight: 134 lb 7.7 oz (61 kg)  Current Body Weight: 131 lb 13.4 oz (59.8 kg), 85.6 % IBW.  Weight Source: Bed Scale  Current BMI (kg/m2): 20.1  Usual Body Weight: 132 lb 12.8 oz (60.2 kg) (March 2022; hx of weighing 150-160# in 2021)  % Weight Change (Calculated): 1.3  Weight Adjustment For: No Adjustment                 BMI Categories: Underweight (BMI less than 22) age over 72    Estimated Daily Nutrient Needs:  Energy Requirements Based On: Kcal/kg  Weight Used for Energy Requirements: Current  Energy (kcal/day): 9156-1827 (30-35 kcals/kg)  Weight Used for Protein Requirements: Ideal  Protein (g/day): 84-98 (1.2-1.4 g/kg)  Method Used for Fluid Requirements: 1 ml/kcal  Fluid (ml/day): 9337-1014    Nutrition Diagnosis: In context of chronic illness, Moderate malnutrition related to inadequate protein-energy intake as evidenced by moderate loss of subcutaneous fat, moderate muscle loss, and pt meeting less than 75% of estimated energy needs for greater than 1 month per pt    Nutrition Interventions:   Food and/or Nutrient Delivery: Continue Current Diet, Modify Oral Nutrition Supplement  Nutrition Education/Counseling: No recommendation at this time  Coordination of Nutrition Care: Continue to monitor while inpatient  Goals:  Previous Goal Met: Progressing toward Goal(s)  Goals: PO intake 50% or greater, within 2 days  Nutrition Monitoring and Evaluation:   Behavioral-Environmental Outcomes: None Identified  Food/Nutrient Intake Outcomes: Food and Nutrient Intake, Supplement Intake  Physical Signs/Symptoms Outcomes: Biochemical Data, GI Status, Weight, Nutrition Focused Physical Findings, Skin, Meal Time Behavior, Hemodynamic Status, Fluid Status or Edema    Discharge Planning:     Too soon to determine     Melissa Moore Ramirez 87, [address],   Contact: [phone]

## 2022-10-01 NOTE — PROGRESS NOTES
Occupational Therapy    [place] [address] ACUTE CARE OCCUPATIONAL THERAPY EVALUATION    Michael Parikh, [birth year], [id number], 10/1/2022    Discharge Recommendation: Inpatient Rehabilitation      History:  [place]:  The primary encounter diagnosis was Fall, initial encounter. Diagnoses of Closed fracture of neck of left femur, initial encounter ([address] T12 compression fracture, initial encounter ([facility] [address] Normal pressure hydrocephalus ([facility] [address] Alcohol use, and Severe sepsis ([facility] [address] were also pertinent to this visit.   Past Medical History:   Diagnosis Date    Anxiety     Cancer ([facility] [address]     prostate    COPD (chronic obstructive pulmonary disease) (HCC)     Glaucoma     Hyperlipidemia     Hypertension          Subjective:  Patient states: \"Man this hurts\"  Pain:  10/10 low back and hip pain  Communication with other providers: co-eval w/ PT, handoff to RN  Restrictions: General Precautions, Fall Risk, L LE WBAT, spinal precautions    Home Setup/Prior level of function:  Social/Functional History  Lives With: Alone  Type of Home: House  Home Layout: One level  Home Access: Level entry  Bathroom Shower/Tub: Tub/Shower unit  Bathroom Toilet: Standard  Bathroom Equipment: Grab bars in shower  Has the patient had two or more falls in the past year or any fall with injury in the past year?: Yes  ADL Assistance: Independent  Homemaking Assistance: Independent (assistance w/ vacuuming only)  Ambulation Assistance: Independent  Transfer Assistance: Independent  Active : Yes  Occupation: Retired  Additional Comments: [employer]     Examination:  Observation: Supine in bed upon arrival, agreeable to therapy eval.  Vision: WFL  Hearing: WFL  Vitals: Stable vitals throughout session on 2L O2      Body Systems and functions:  ROM: WFL   Strength: B UE grossly 4/5 across all major joints   Sensation: WFL  Tone: Normal  Coordination: WFL  Perception: WNL      Cognitive and Psychosocial Functioning:  Overall cognitive status: alert, oriented x3  Affect: Normal   Arousal/Alertness Delayed responses to stimuli; Appropriate responses to stimuli   Following Commands Follows multistep commands with increased time; Follows multistep commands with repitition   Attention Span Appears intact   Memory Decreased short term memory   Safety Judgement Decreased awareness of need for safety; Decreased awareness of need for assistance   Problem Solving Decreased awareness of errors   Insights Decreased awareness of deficits   Initiation Requires cues for some   Sequencing Requires cues for some       Functional Mobility:  Bed mobility:  supine to sitting EOB w/ mod Ax2, Vcs for sequencing spinal precautions  Sitting balance:  SBA w/ intermittent R side leaning to offset WB on L hip. Able to correct w/ VCs    Transfers: STS from EOB w/ mod A, stand to sit mod A  Standing balance:  min A static and dynamic w/ RW  Functional Mobility: ambulated short functional household distance using RW w/ min Ax2  Toilet/Shower Transfers: NT        Activities of Daily Living (ADLs):  Feeding: set up A  Grooming: SBA  UB bathing: mod A  LB bathing: dependent  UB dressing: mod A  LB dressing: dependent  Toileting: dependent    *ADL determined per observation of functional mobility, balance, activity tolerance, cognition, or actual ADL performance. AM-PAC 6 click short form for inpatient daily activity:   How much help from another person does the patient currently need. .. Unable  Dep A Lot  Max A A Lot   Mod A A Little  Min A A Little   CGA  SBA None   Mod I  Indep  Sup   1. Putting on and taking off regular lower body clothing? [x] 1    [] 2   [] 2   [] 3   [] 3   [] 4      2. Bathing (including washing, rinsing, drying)? [x] 1   [] 2   [] 2 [] 3 [] 3 [] 4   3. Toileting, which includes using toilet, bedpan, or urinal? [x] 1    [] 2   [] 2   [] 3   [] 3   [] 4     4. Putting on and taking off regular upper body clothing? [] 1   [] 2   [x] 2   [] 3   [] 3    [] 4      5. Taking care of personal grooming such as brushing teeth? [] 1   [] 2    [] 2 [] 3    [x] 3   [] 4      6. Eating meals? [] 1   [] 2   [] 2   [] 3   [] 3   [x] 4        Raw Score:  12     [24=0% impaired(CH), 23=1-19%(CI), 20-22=20-39%(CJ), 15-19=40-59%(CK), 10-14=60-79%(CL), 7-9=80-99%(CM), 6=100%(CN)]     Treatment:    Therapeutic Activity Training:   Therapeutic activity training was instructed today. Cues were given for safety, sequence, UE/LE placement, awareness, and balance. Activities performed today included bed mobility training, sup-sit, sit-stand, ambulation. Educated pt on role of OT, therapy POC and functional goals, progression w/ ADLs and transfers, importance of movement and OOB activity, d/c recommendations     Safety Measures: Gait belt used, Left in bed, transport and RN prepping pt for MRI  Recommendations for NURSING activity:  Up to chair for all 3 meals and up to [facility] vs bathroom for all toileting needs     Assessment:  Pt is a 74 y o M admitted d/t AMS, weakness. L femoral neck fx, acute T12 compression fx. POD #1 L hip hemiarthroplasty. Pt at baseline is IND for ADLs, IND for high level IADLs, and IND for functional transfers/mobility w/o AD. Pt currently presents w/ deficits in ADL and high level IADL independence, functional ADL transfers, strength, and functional activity tolerance. Continued OT services recommended to increase safety and independence with ADL routine and to address remaining functional deficits. Pt would benefit from continued acute care OT services w/ discharge to ARU. Complexity: High  Prognosis: Good, no significant barriers to participation at this time.    Occupational Therapy Plan  Times Per Week: 3+  Current Treatment Recommendations: Strengthening, ROM, Functional mobility training, Endurance training, Pain management, Cognitive reorientation, Neuromuscular re-education, Patient/Caregiver education & training, Equipment evaluation, education, & procurement, Positioning, Self-Care / ADL, Home management training, Cognitive/Perceptual training, Safety education & training         Goals:  Pt will complete all aspects of bed mobility (maintaining spinal precautions) for EOB/OOB ADLs w/ mod I. Pt will complete UB ADLs w/ mod I. Pt will complete LB ADLs w/ SBA. Pt will complete all functional transfers to and from bed, chair, toilet, shower chair w/ SBA. Pt will ambulate functional household distance w/ SBA. Pt will complete all aspects of toileting task w/ SBA. Pt will perform therex/theract in order to increase strength and functional activity tolerance necessary for increased independence w/ ADL routine.     Pt goal: go home, get stronger  Time Frame for STGs: discharge    Equipment: Continue to assess at next LOC    Time:   Time in: 1415  Time out: 1449  Total time: 34  Timed treatment minutes: 24        Electronically signed by:      QUINTON Gibbs/CHOCO  [id number]

## 2022-10-01 NOTE — CARE COORDINATION
CM reviewed notes. Pt has L femoral neck fracture. Post op day 1. CM placed a white board for PT/OT to see. Pt has hx of ETOH use. Per previous notes pt would like ARU and they are following with referral. CM team will continue to follow.  [address]

## 2022-10-01 NOTE — PROGRESS NOTES
[address] [facility]  consulted by Dr. Dylon Adame for monitoring and adjustment. Indication for treatment: Sepsis of unknown etiology  Goal trough: [] 10-15 mcg/mL or [x] 15-20 mcg/ml  AUC/ANANT: [] <500 or [x] 400-600    Pertinent Laboratory Values:   Temp Readings from Last 3 Encounters:   10/01/22 97.5 °F (36.4 °C)   03/23/21 98.5 °F (36.9 °C) (Oral)   01/20/20 98.4 °F (36.9 °C) (Oral)     Recent Labs     09/29/22  1328 09/30/22  1240   WBC 16.5* 16.2*       Recent Labs     09/29/22  1328 09/30/22  1240   BUN 15 9   CREATININE 0.5* 0.4*       Estimated Creatinine Clearance: 137 mL/min (A) (based on SCr of 0.4 mg/dL (L)). Intake/Output Summary (Last 24 hours) at 10/1/2022 0957  Last data filed at 10/1/2022 0400  Gross per 24 hour   Intake 2860 ml   Output 950 ml   Net 1910 ml         Pertinent Cultures:  Date    Source    Results  9/29   Covid-19   Not Detected  9/29   Blood x 2   Staph Epidermidis (1/4)    Vancomycin level:   TROUGH:    Recent Labs     10/01/22  0705   VANCOTROUGH 28.4*     RANDOM:  No results for input(s): VANCORANDOM in the last 72 hours. Assessment:  SCr, BUN, and urine output: stable renal function/Scr at baseline  Day(s) of therapy: 2 of 7  Vancomycin concentration:   10/1 - 28.4, 1 hour post-dose,     Plan:  Continue on Vancomycin 1000mg IVPB every 12 hours  Random level therapeutic, 1 hour post dose;  at steady-state  Will repeat level in 2 days  Pharmacy will continue to monitor patient and adjust therapy as indicated    VANCOMYCIN CONCENTRATION SCHEDULED FOR 10/3 @ 0500. Thank you for the consult.   Rama Quezada, [address] [place] [address]  10/1/2022 9:57 AM

## 2022-10-01 NOTE — PROGRESS NOTES
Physical Therapy  [place] [address] ACUTE CARE PHYSICAL THERAPY EVALUATION  Sunny Friedman, [birth year], [id number], 10/1/2022    History  [place]:  The primary encounter diagnosis was Fall, initial encounter. Diagnoses of Closed fracture of neck of left femur, initial encounter ([address] T12 compression fracture, initial encounter ([address] Normal pressure hydrocephalus ([address] Alcohol use, and Severe sepsis ([address] were also pertinent to this visit. Patient  has a past medical history of Anxiety, Cancer ([facility] [address] COPD (chronic obstructive pulmonary disease) ([facility] [address] Glaucoma, Hyperlipidemia, and Hypertension. Patient  has a past surgical history that includes Colonoscopy; Tonsillectomy; Prostate surgery (2011); and Colonoscopy (N/A, 1/18/2020). Assessment:  Pt presents 2 days s/p admission for AMS, weakness - unable to ambulate ; imaging demonstrates evidence of L femoral neck fx w/ displacement, normal pressure hydrocephalus, acute T12 compression fx ; L WBAT for L hip fx and spinal precautions, no brace. Pt is from home alone, lives in level entry, 1 level home, ind PLOF, no AD, hx of falls 2/2 balance, retired. Pt is primarily limited by pain, additional impairments includes dec strength, balance, endurance, functional mobility, cognition, safety awareness, posture, sensation. He has multiple acute fractures, ind PLOF, hx of falls, pain limiting all mobility, unable to complete OOB w/o skilled assist. Recommending ARU. Complexity: Moderate  Prognosis: Fair + ; hx of falls, pain, balance, comorbid conditions, cognition  Plan 4-5+/week, 1 week  Equipment: pend to next LOC    Recommendations for NURSING mobility: SPT from bed to chair and BSC    Subjective:  Patient states:  \"(Does it feel good to be out of fbed dad?) No it does not. \"    Pain:  10/10 low back and hip pain  Communication with other providers:  Handoff to RN, co-eval with KADIE Simmons  Restrictions: fall risk; general precautions; WBAT on LLE; spinal precautions    Home Setup/Prior level of function  Social/Functional History  Lives With: Alone  Type of Home: House  Home Layout: One level  Home Access: Level entry  Bathroom Shower/Tub: Tub/Shower unit  Bathroom Toilet: Standard  Bathroom Equipment: Grab bars in shower  Has the patient had two or more falls in the past year or any fall with injury in the past year?: Yes  ADL Assistance: [address]: Independent (assistance w/ vacuuming only)  Ambulation Assistance: Independent  Transfer Assistance: Independent  Active : Yes  Occupation: Retired  Additional Comments: [employer]    Examination of body systems (includes body structures/functions, activity/participation limitations):  Observation:  Supine, awake, lethargic, agreeable. Pain limiting all mobility, participatory t/o, pleasant and hopeful to return to [facility]. Tele, BP cuff, thomas, peripheral IV in place upon arrival.  Posture: fair ; fwd shoulders, heavy R trunk lean, grossly fwd flexed posture  Vision:  WFL  Hearing:  slightly [place]  Cardiopulmonary:  WFL  Cognition: A&O x 3 - disorientated to situation ; delayed, follows simple commands w/ inc time, follows multistep commands w/ repetition, attention intact, impaired recall of recent evenets, fair + safety awareness, mod cues needed for sequencing/setup, mod cues needed for initiation, cues to bring attention to errors    Musculoskeletal  ROM R/L:  R: AROM WFL  L: PROM WFL; AROM dec ~33% 2/2 pain    Strength R/L:    R: grossly 4/5  L: grossly 3-/5  Sensation: partial dec sensation in BL LE to light touch  Tone: normotonic  Coordination: WFL  Proprioception: WFL  Gait pattern: shuffling, heavy AD reliance, assist required to promote improved weight acceptance through affected LLE     Mobility:  Rolling L:  mod A x 2  Sidelying to sit:  mod A x 2  Transfers: mod A  Sitting balance:  good. Standing balance:  fair.     Gait: 10 ft using FWW at min A x 2 ; dec kiko, dec step length, dec step height, shuffling quality, antalgia during L LE WB, fwd flexed w/ inc UE reliance, assist for AD mgmt, assist required to promote improved weight acceptance through affected LLE     AMPAC 6 Clicks Inpatient Mobility:  AM-PAC Inpatient Mobility Raw Score : 11    Goals:  Pt Goals: return to PLOF  Short Term Goals  Time Frame for Short Term Goals: 1 week  Short Term Goal 1: pt will complete bed mobility at min A while maintaining spinal precautions  Short Term Goal 2: pt will complete transfers at min A  Short Term Goal 3: pt will ambulate 75 ft using FWW at min A  Short Term Goal 4: pt will verbalize and demonstrate understanding of spinal precautions     Treatment plan:  Bed mobility, functional mobility, transfers, balance, gait, TA, TX, strength activities, neuro    Treatment:  Therapeutic Activity Training:   Therapeutic activity training was instructed today. Cues were given for safety, sequence, UE/LE placement, awareness, and balance. Activities performed today included bed mobility training, sup-sit, sit-stand, SPT. Functional mobility, precaution education, transfer training, safety education, DC planning    Gait Training:  Cues were given for safety, sequence, device management, balance, posture, awareness, path.     Initial OOB and gait, precaution education relative to gait, pacing, AD education    Positioned for comfort and pressure relief  Safety: patient left in bed, call light within reach, RN notified, gait belt used, all needs met    Time:   Time in: 1415  Time out: 1449  Timed treatment minutes: 24 (TA, GT)  Total time: 29    Electronically signed by:    Lois Odom, PT, DPT  10/1/2022, 4:02 PM

## 2022-10-01 NOTE — PROGRESS NOTES
V2.0  [facility] [place] Hospitalist Progress Note      Name:  Divya Manzanares /Age/Sex: [id number]  [de-identified]74 y.o. male)   MRN & CSN:  [id number] & [id number] Encounter Date/Time: 10/1/2022 11:12 AM EDT    Location:  -A PCP: Serena Zamudio MD       Hospital Day: 3    Assessment and Plan:   Divya Manzanares is a 74 y.o. male with L hip fracture      Plan:    Severe sepsis - ruled out     Alcohol abuse  Placed on CIWA protocol with Ativan. Left hip fracture  -arthroplasty   -uncomplicated post-op course thus far  -ARU when ready     T12 compression fracture  -Neurosurgery has been consulted. Hypertension  -Holding home nifedipine resume if necessary     NPH  -Noted on CT. As per daughter he does have some incontinence falls however that could be explained by his alcohol. We will follow-up with neurology outpatient. Ppx:  BID  Dispo: TBD    Subjective:     Chief Complaint: Fever and Other (Urinary incontance)       No complaints from patient. No adverse interval events. Review of Systems:    Review of Systems    10 point ROS negative except as stated above in \"subjective\" section    Objective:      Intake/Output Summary (Last 24 hours) at 10/1/2022 1203  Last data filed at 10/1/2022 0400  Gross per 24 hour   Intake 2860 ml   Output 800 ml   Net 2060 ml          Vitals:   Vitals:    10/01/22 1117   BP:    Pulse:    Resp: 16   Temp:    SpO2:        Physical Exam:     Exam not done today  ROS not obtained today     Medications:   Medications:    melatonin  6 mg Oral Nightly    sodium chloride flush  5-40 mL IntraVENous 2 times per day    aspirin  325 mg Oral BID    guaiFENesin  600 mg Oral BID    sodium chloride (Inhalant)  4 mL Nebulization 4x Daily    atorvastatin  40 mg Oral Daily    folic acid  1 mg Oral Daily    metoprolol tartrate  50 mg Oral BID    nicotine  1 patch TransDERmal Daily    pantoprazole  40 mg Oral QAM AC    tiotropium  1 puff Inhalation Daily    thiamine  100 mg Oral Daily      Infusions:    lactated ringers 1,000 mL (10/01/22 0610)    sodium chloride       PRN Meds: sodium chloride flush, 5-40 mL, PRN  sodium chloride, 500 mL, PRN  acetaminophen, 650 mg, Q4H PRN  oxyCODONE, 5 mg, Q4H PRN   Or  oxyCODONE, 10 mg, Q4H PRN  HYDROmorphone, 0.25 mg, Q3H PRN   Or  HYDROmorphone, 0.5 mg, Q3H PRN  LORazepam, 1 mg, Q1H PRN   Or  LORazepam, 1 mg, Q1H PRN   Or  LORazepam, 2 mg, Q1H PRN   Or  LORazepam, 2 mg, Q1H PRN   Or  LORazepam, 3 mg, Q1H PRN   Or  LORazepam, 3 mg, Q1H PRN   Or  LORazepam, 4 mg, Q1H PRN   Or  LORazepam, 4 mg, Q1H PRN  ondansetron, 4 mg, Q8H PRN   Or  ondansetron, 4 mg, Q6H PRN  polyethylene glycol, 17 g, Daily PRN  acetaminophen, 650 mg, Q6H PRN      Labs           Imaging/Diagnostics Last 24 Hours   CT HEAD WO CONTRAST    Result Date: 9/29/2022  EXAMINATION: CT OF THE HEAD WITHOUT CONTRAST; CT OF THE CERVICAL SPINE WITHOUT CONTRAST 9/29/2022 2:42 pm; 9/29/2022 2:43 pm TECHNIQUE: CT of the head was performed without the administration of intravenous contrast. Automated exposure control, iterative reconstruction, and/or weight based adjustment of the mA/kV was utilized to reduce the radiation dose to as low as reasonably achievable.; CT of the cervical spine was performed without the administration of intravenous contrast. Multiplanar reformatted images are provided for review. Automated exposure control, iterative reconstruction, and/or weight based adjustment of the mA/kV was utilized to reduce the radiation dose to as low as reasonably achievable. COMPARISON: 03/23/2021 HISTORY: ORDERING SYSTEM PROVIDED HISTORY: possible AMS, unknown fall TECHNOLOGIST PROVIDED HISTORY: Reason for exam:->possible AMS, unknown fall Has a \"code stroke\" or \"stroke alert\" been called? ->No Decision Support Exception - unselect if not a suspected or confirmed emergency medical condition->Emergency Medical Condition (MA) Reason for Exam: possible AMS, unknown fall Additional signs and symptoms: none Relevant Medical/Surgical History: none; ORDERING SYSTEM PROVIDED HISTORY: possible fall, possible ams TECHNOLOGIST PROVIDED HISTORY: Reason for exam:->possible fall, possible ams Decision Support Exception - unselect if not a suspected or confirmed emergency medical condition->Emergency Medical Condition (MA) Reason for Exam: possible fall, possible ams Additional signs and symptoms: none Relevant Medical/Surgical History: none FINDINGS: BRAIN/VENTRICLES: There is no acute intracranial hemorrhage, mass effect or midline shift. No abnormal extra-axial fluid collection. The gray-white differentiation is maintained without evidence of an acute infarct. There remains stable disproportionate ventricular dilatation in relation to cerebral atrophy, with progressive periventricular white matter hypoattenuation, which may suggest normal pressure hydrocephalus with associated transependymal CSF flow. There is stable chronic small vessel ischemic white matter disease. No focus of acute abnormal brain attenuation is identified. ORBITS: The visualized portion of the orbits demonstrate no acute abnormality. SINUSES: The visualized paranasal sinuses and mastoid air cells demonstrate no acute abnormality. SOFT TISSUES/SKULL:  No acute abnormality of the visualized skull or soft tissues. CERVICAL BONES/ALIGNMENT: There is no acute fracture or subluxation. There is a stable mild 2 mm degenerative retrolisthesis of C3. No additional abnormal listhesis is identified. The vertebral bodies are otherwise normal in height and alignment. The posterior elements are intact and aligned. No destructive osseous lesion is seen. CERVICAL DEGENERATIVE CHANGES: There is stable mild multilevel spondylosis, most notable at C4 through C6. There is stable mild disc space loss and endplate sclerosis at Q4-N3, C4-C5, and C5-C6.   There are mild disc osteophyte protrusions at C3-C4, C4-C5, and C5-C6, without significant central canal stenosis. However, the combination of multilevel bilateral uncovertebral and facet hypertrophy leads to varying degrees of multilevel bilateral neuroforaminal stenosis. CERVICAL SOFT TISSUES: The cervical soft tissues are unremarkable. The lung apices are clear. 1. No acute intracranial abnormality. 2. Findings suggestive of normal pressure hydrocephalus. Clinical correlation is advised. 3. No acute fracture or subluxation of the cervical spine. CT CERVICAL SPINE WO CONTRAST    Result Date: 9/29/2022  EXAMINATION: CT OF THE HEAD WITHOUT CONTRAST; CT OF THE CERVICAL SPINE WITHOUT CONTRAST 9/29/2022 2:42 pm; 9/29/2022 2:43 pm TECHNIQUE: CT of the head was performed without the administration of intravenous contrast. Automated exposure control, iterative reconstruction, and/or weight based adjustment of the mA/kV was utilized to reduce the radiation dose to as low as reasonably achievable.; CT of the cervical spine was performed without the administration of intravenous contrast. Multiplanar reformatted images are provided for review. Automated exposure control, iterative reconstruction, and/or weight based adjustment of the mA/kV was utilized to reduce the radiation dose to as low as reasonably achievable. COMPARISON: 03/23/2021 HISTORY: ORDERING SYSTEM PROVIDED HISTORY: possible AMS, unknown fall TECHNOLOGIST PROVIDED HISTORY: Reason for exam:->possible AMS, unknown fall Has a \"code stroke\" or \"stroke alert\" been called? ->No Decision Support Exception - unselect if not a suspected or confirmed emergency medical condition->Emergency Medical Condition (MA) Reason for Exam: possible AMS, unknown fall Additional signs and symptoms: none Relevant Medical/Surgical History: none; ORDERING SYSTEM PROVIDED HISTORY: possible fall, possible ams TECHNOLOGIST PROVIDED HISTORY: Reason for exam:->possible fall, possible ams Decision Support Exception - unselect if not a suspected or confirmed emergency medical condition->Emergency Medical Condition (MA) Reason for Exam: possible fall, possible ams Additional signs and symptoms: none Relevant Medical/Surgical History: none FINDINGS: BRAIN/VENTRICLES: There is no acute intracranial hemorrhage, mass effect or midline shift. No abnormal extra-axial fluid collection. The gray-white differentiation is maintained without evidence of an acute infarct. There remains stable disproportionate ventricular dilatation in relation to cerebral atrophy, with progressive periventricular white matter hypoattenuation, which may suggest normal pressure hydrocephalus with associated transependymal CSF flow. There is stable chronic small vessel ischemic white matter disease. No focus of acute abnormal brain attenuation is identified. ORBITS: The visualized portion of the orbits demonstrate no acute abnormality. SINUSES: The visualized paranasal sinuses and mastoid air cells demonstrate no acute abnormality. SOFT TISSUES/SKULL:  No acute abnormality of the visualized skull or soft tissues. CERVICAL BONES/ALIGNMENT: There is no acute fracture or subluxation. There is a stable mild 2 mm degenerative retrolisthesis of C3. No additional abnormal listhesis is identified. The vertebral bodies are otherwise normal in height and alignment. The posterior elements are intact and aligned. No destructive osseous lesion is seen. CERVICAL DEGENERATIVE CHANGES: There is stable mild multilevel spondylosis, most notable at C4 through C6. There is stable mild disc space loss and endplate sclerosis at A6-Q6, C4-C5, and C5-C6. There are mild disc osteophyte protrusions at C3-C4, C4-C5, and C5-C6, without significant central canal stenosis. However, the combination of multilevel bilateral uncovertebral and facet hypertrophy leads to varying degrees of multilevel bilateral neuroforaminal stenosis. CERVICAL SOFT TISSUES: The cervical soft tissues are unremarkable. The lung apices are clear.      1. No acute intracranial abnormality. 2. Findings suggestive of normal pressure hydrocephalus. Clinical correlation is advised. 3. No acute fracture or subluxation of the cervical spine. CT LUMBAR SPINE WO CONTRAST    Result Date: 9/29/2022  EXAMINATION: CT OF THE LUMBAR SPINE WITHOUT CONTRAST  9/29/2022 TECHNIQUE: CT of the lumbar spine was performed without the administration of intravenous contrast. Multiplanar reformatted images are provided for review. Adjustment of mA and/or kV according to patient size was utilized. Automated exposure control, iterative reconstruction, and/or weight based adjustment of the mA/kV was utilized to reduce the radiation dose to as low as reasonably achievable. COMPARISON: CT from January 17, 2020 HISTORY: ORDERING SYSTEM PROVIDED HISTORY: pain, possible fall TECHNOLOGIST PROVIDED HISTORY: Reason for exam:->pain, possible fall Decision Support Exception - unselect if not a suspected or confirmed emergency medical condition->Emergency Medical Condition (MA) Reason for Exam: pain, possible fall Additional signs and symptoms: none Relevant Medical/Surgical History: none FINDINGS: BONES/ALIGNMENT: There is an fracture of the T12 vertebral body with estimated 35% vertebral body height loss. This is favored to be acute. Minimal bony retropulsion at the posterosuperior endplate measuring 4 mm. No significant bony spinal canal stenosis however. Findings are new from January 17, 2020 and favored to be acute. Increased lucency at the superior endplate is favored to be related to the fracture with osteopenia. Pathologic fractures felt less likely, however attention on follow-up. Old right rib 11 fracture with bony callus. DEGENERATIVE CHANGES: Degenerative endplate changes most significant at L4-S1. Multilevel facet arthrosis. SOFT TISSUES/RETROPERITONEUM: No paraspinal mass is seen. Acute compression fracture of T12 with 35% vertebral body height.   Mild 4 mm of bony retropulsion without significant bony spinal canal stenosis. Prominent degenerative disc disease most significant at L4-S1. CT ABDOMEN PELVIS W IV CONTRAST Additional Contrast? None    Result Date: 9/29/2022  EXAMINATION: CT OF THE ABDOMEN AND PELVIS WITH CONTRAST, 9/29/2022 6:41 pm TECHNIQUE: CT of the abdomen and pelvis was performed with the administration of intravenous contrast. Multiplanar reformatted images are provided for review. Automated exposure control, iterative reconstruction, and/or weight based adjustment of the mA/kV was utilized to reduce the radiation dose to as low as reasonably achievable. COMPARISON: Hip x-ray 09/29/2022 and CT abdomen and pelvis 01/17/2020. HISTORY: ORDERING SYSTEM PROVIDED HISTORY:  Severe sepsis. concern for abd source. TECHNOLOGIST PROVIDED HISTORY: Reason for Exam:  Severe sepsis. concern for abd source. Additional Contrast?  None Decision Support Exception - unselect if not a suspected or confirmed emergency medical condition->Emergency Medical Condition (MA) Reason for Exam:  Severe sepsis. concern for abd source. FINDINGS: Lower Chest:  Stable right lower lobe nodule given 2 years of stability, favored benign. Subpleural nodule left lung base as well also unchanged from prior examination. Also given the 2 year stability, this is of likely benign etiology. Organs: Left renal cysts. Right renal cysts. These are benign etiologies requiring no further follow-up or workup. Otherwise, mild fatty infiltration of the liver. Spleen, adrenal glands, and pancreas are unremarkable. GI/Bowel: There is moderate wall thickening along gastric antrum and duodenum can be seen with duodenitis versus antral gastritis. Favor duodenitis. Mild retained stool throughout the colon. No evidence of bowel obstruction. Left lower quadrant inguinal hernia containing a small knuckle/looped bowel. Please correlate with direct physical exam findings.   Otherwise moderate to severe colonic diverticulosis. Retained stool rectosigmoid junction delete Pelvis: Brachytherapy seeds within the prostate. Giron catheter within the bladder which is mostly compressed. Peritoneum/Retroperitoneum: No free air or free fluid. No suspicious adenopathy. Vascular calcifications. Bones/Soft Tissues: Left femur neck fracture identified as on recent x-ray of the hip. T12 compression fracture with retropulsion into the canal.     Moderate wall thickening along the duodenum and gastric antrum versus antral gastritis versus duodenitis. Please correlate exam findings. Moderate severe sigmoid colonic diverticulosis. Left inguinal hernia containing a small loop of colon. This is of uncertain clinical significance. Please correlate with exam findings. T12 compression fracture again noted. XR CHEST PORTABLE    Result Date: 9/29/2022  EXAMINATION: ONE XRAY VIEW OF THE CHEST 9/29/2022 2:22 pm COMPARISON: 03/23/2021 HISTORY: ORDERING SYSTEM PROVIDED HISTORY: fall, confusion, sob, h/o of  TECHNOLOGIST PROVIDED HISTORY: Reason for exam:->fall, confusion, sob, h/o of  Reason for Exam: FALL CONFUSION FINDINGS: The cardiac silhouette and mediastinal contours are stable. The lungs are hyperinflated, likely related to underlying emphysema. No focal lung infiltrate. No pleural effusion or pneumothorax. Old left-sided rib fracture is noted. There is also a left clavicular fracture that has incompletely healed, age indeterminate. 1. Emphysema without evidence of a focal lung infiltrate. 2. Left clavicular fracture, of uncertain age. 3. Old, healed left-sided rib fracture.      XR HIP 2-3 VW W PELVIS LEFT    Result Date: 9/29/2022  EXAMINATION: ONE XRAY VIEW OF THE PELVIS AND TWO XRAY VIEWS LEFT HIP 9/29/2022 2:24 pm COMPARISON: 03/23/2021 pelvis radiographs HISTORY: ORDERING SYSTEM PROVIDED HISTORY: pain, limited ROM, possible fall TECHNOLOGIST PROVIDED HISTORY: Reason for exam:->pain, limited ROM, possible fall Reason for Exam: PAIN FINDINGS: There is an acute fracture of the left femoral neck with associated foreshortening and mild lateral displacement of the distal fracture fragment. There is mild varus angulation of the fracture components. The femoral head is well seated within the acetabulum. No additional fractures are identified. Degenerative changes of the lower lumbar spine. Brachytherapy seeds overlie the pelvis. Acute, mildly displaced left femoral neck fracture with associated foreshortening and mild lateral displacement of the distal fracture fragment.        Electronically signed by Corey Grant MD on 10/1/2022 at 12:03 PM

## 2022-10-01 NOTE — PLAN OF CARE
Problem: Discharge Planning  Goal: Discharge to home or other facility with appropriate resources  Outcome: Progressing  Flowsheets  Taken 10/1/2022 1022 by Ilya Middleton RN  Discharge to home or other facility with appropriate resources:   Identify barriers to discharge with patient and caregiver   Arrange for needed discharge resources and transportation as appropriate   Identify discharge learning needs (meds, wound care, etc)   Refer to discharge planning if patient needs post-hospital services based on physician order or complex needs related to functional status, cognitive ability or social support system   Arrange for interpreters to assist at discharge as needed  Taken 9/30/2022 2050 by Jah Vegas RN  Discharge to home or other facility with appropriate resources:   Identify barriers to discharge with patient and caregiver   Arrange for needed discharge resources and transportation as appropriate   Identify discharge learning needs (meds, wound care, etc)   Arrange for interpreters to assist at discharge as needed   Refer to discharge planning if patient needs post-hospital services based on physician order or complex needs related to functional status, cognitive ability or social support system     Problem: Pain  Goal: Verbalizes/displays adequate comfort level or baseline comfort level  Outcome: Progressing  Flowsheets (Taken 10/1/2022 1020)  Verbalizes/displays adequate comfort level or baseline comfort level:   Encourage patient to monitor pain and request assistance   Assess pain using appropriate pain scale   Administer analgesics based on type and severity of pain and evaluate response   Notify Licensed Independent Practitioner if interventions unsuccessful or patient reports new pain   Consider cultural and social influences on pain and pain management   Implement non-pharmacological measures as appropriate and evaluate response     Problem: Skin/Tissue Integrity  Goal: Absence of new skin breakdown  Description: 1. Monitor for areas of redness and/or skin breakdown  2. Assess vascular access sites hourly  3. Every 4-6 hours minimum:  Change oxygen saturation probe site  4. Every 4-6 hours:  If on nasal continuous positive airway pressure, respiratory therapy assess nares and determine need for appliance change or resting period. Outcome: Progressing     Problem: Safety - Adult  Goal: Free from fall injury  Outcome: Progressing     Problem: Confusion  Goal: Confusion, delirium, dementia, or psychosis is improved or at baseline  Description: INTERVENTIONS:  1. Assess for possible contributors to thought disturbance, including medications, impaired vision or hearing, underlying metabolic abnormalities, dehydration, psychiatric diagnoses, and notify attending LIP  2. [place] high risk fall precautions, as indicated  3. Provide frequent short contacts to provide reality reorientation, refocusing and direction  4. Decrease environmental stimuli, including noise as appropriate  5. Monitor and intervene to maintain adequate nutrition, hydration, elimination, sleep and activity  6. If unable to ensure safety without constant attention obtain sitter and review sitter guidelines with assigned personnel  7.  Initiate Psychosocial CNS and Spiritual Care consult, as indicated  Outcome: Progressing  Flowsheets (Taken 9/30/2022 2050 by Thaddeus Goldmann, RN)  Effect of thought disturbance (confusion, delirium, dementia, or psychosis) are managed with adequate functional status:   Assess for contributors to thought disturbance, including medications, impaired vision or hearing, underlying metabolic abnormalities, dehydration, psychiatric diagnoses, notify [facility] high risk fall precautions, as indicated   Provide frequent short contacts to provide reality reorientation, refocusing and direction   Decrease environmental stimuli, including noise as appropriate   Monitor and intervene to maintain adequate nutrition, hydration, elimination, sleep and activity   If unable to ensure safety without constant attention obtain sitter and review sitter guidelines with assigned personnel   Initiate Psychosocial Clinical Nurse Specialist and Spiritual Care consult, as indicated     Problem: Nutrition Deficit:  Goal: Optimize nutritional status  Outcome: Progressing  Flowsheets (Taken 10/1/2022 1012 by Jeffry Ayala, MS, RD, LD)  Nutrient intake appropriate for improving, restoring, or maintaining nutritional needs:   Recommend appropriate diets, oral nutritional supplements, and vitamin/mineral supplements   Monitor oral intake, labs, and treatment plans

## 2022-10-02 NOTE — PROGRESS NOTES
Case management called for TLSO brace. Unable to get today. DME are closed on weekends.  Case management to follow up tomorrow

## 2022-10-02 NOTE — CARE COORDINATION
Reviewed chart and therapy recommending ARU, spoke with 1 [facility] admission for ARU and she will review for possible admission and will have to start prior auth.

## 2022-10-02 NOTE — PROGRESS NOTES
Neurosurgery Progress Note  10/2/2022 6:05 AM      Assessment and Plan:   Acute T12 compression fracture- concerns for fracture to be chronic per family. He has chronic pain in his back at baseline. He is not a good historian at the moment. He tells me that he does have mid/low back pain but that he always has back pain. He is not tender midline when I palpate at T12 but tell me that his back feels sore with laying in the bed. MRI of the lumbar spine does show that his T12 fracture is acute with 3 mm of retropulsion. [facility] called and TLSO brace ordered. He is to wear this whenever upright and out of bed. Patient okay to go to ARU from neurosurgical perspective once brace and standing thoracolumbar x-ray is obtained. Concern for NPH- ventriculomegaly noted on CT head 9/29/22, comparable in size on scan in March 2021. Discussed with daughter yesterday, patient is confused at times now but prior to early this week he was driving, doing his own household chores. He did not have unsteadiness or urinary incontinence. Low suspicion for NPH, but did explain that further work-up can be done outpatient for this should be begin to display more consistent symptoms. Alcohol use- daily drinker per family, on CIWA scale. Could be contributing to his ventriculomegaly by way of generalized cerebral atrophy. Sx fixation of left femoral neck- POD 2 today    Supervising physician: Alberto Vargas. Gela Rider MD.  Dr. Gela Rider was readily and continuously available by phone for direct consultation regarding the care of this patient. Subjective:   Admit Date: 9/29/2022  PCP: Karina Gregorio MD    Patient alert, watching TV. His mentation is improving every day. He is ANO x4 this morning. He understands that he has a back fracture, he does complain of some pain in his back currently. He understands he is to wear his back brace whenever upright and out of bed and that he will follow-up with me in 6 weeks.     Data:   Scheduled Meds:   melatonin  6 mg Oral Nightly    NIFEdipine  60 mg Oral Daily    sodium chloride flush  5-40 mL IntraVENous 2 times per day    aspirin  325 mg Oral BID    guaiFENesin  600 mg Oral BID    atorvastatin  40 mg Oral Daily    folic acid  1 mg Oral Daily    metoprolol tartrate  50 mg Oral BID    nicotine  1 patch TransDERmal Daily    pantoprazole  40 mg Oral QAM AC    tiotropium  1 puff Inhalation Daily    thiamine  100 mg Oral Daily         I/O last 3 completed shifts: In: 2860 [P.O.:100; I.V.:2260; IV Piggyback:500]  Out: 950 [Urine:650; Blood:300]  I/O this shift:  In: 2008 [P.O.:120; I.V.:1888]  Out: 700 [Urine:700]    Intake/Output Summary (Last 24 hours) at 10/2/2022 0866  Last data filed at 10/2/2022 0400  Gross per 24 hour   Intake 2008 ml   Output 700 ml   Net 1308 ml     CULTURE results: Invalid input(s): BLOOD CULTURE,  URINE CULTURE, SURGICAL CULTURE  CBC:   Recent Labs     09/29/22  1328 09/30/22  1240   WBC 16.5* 16.2*   HGB 12.6* 10.4*   * 393     BMP:    Recent Labs     09/29/22  1328 09/30/22  1240   * 126*   K 4.3 4.1   CL 98* 93*   CO2 23 24   BUN 15 9   CREATININE 0.5* 0.4*   GLUCOSE 136* 134*     Hepatic:   Recent Labs     09/29/22  1328   AST 22   ALT 15   BILITOT 0.5   ALKPHOS 60         IMAGING: available xray, CT, and MRI results reviewed    MRI lumbar spine wo contrast 10/1/22  Impression   Acute superior endplate compression fracture of T12 with 30% height loss and   3 mm retropulsion into the spinal canal.       At L3-L4, moderate bilateral neural foraminal narrowing.        At L5-S1, grade 1 retrolisthesis, central disc protrusion severe bilateral   neural foraminal narrowing     Objective:   Vitals: BP (!) 122/53   Pulse 76   Temp 98.5 °F (36.9 °C)   Resp 16   Ht 5' 7\" (1.702 m)   Wt 142 lb 11.2 oz (64.7 kg)   SpO2 94%   BMI 22.35 kg/m²   General appearance: Alert, sitting up in bed, no distress  HEENT: Normocephalic, atraumatic, EOM intact  Neurologic: Alert and oriented x4, speech clear and coherent, no facial droop, follows commands briskly, sensation intact in all extremities  Extremities: Bilateral upper extremities able to resist gravity, bilateral lower extremities 5/5 throughout except for left iliopsoas which is 2/5.   Neurosurgical Incision: none  Drains: none    Electronically signed by Anurag Stone PA-C on 10/2/2022 at 6:05 AM

## 2022-10-02 NOTE — PROGRESS NOTES
Kristine Pitts is a 74 y.o. male patient. 1. Fall, initial encounter    2. Closed fracture of neck of left femur, initial encounter ([facility]    3. T12 compression fracture, initial encounter ([facility]    4. Normal pressure hydrocephalus (HCC)    5. Alcohol use    6. Severe sepsis [facility])      Past Medical History:   Diagnosis Date    Anxiety     Cancer [facility])     prostate    COPD (chronic obstructive pulmonary disease) (HCC)     Glaucoma     Hyperlipidemia     Hypertension      No past surgical history pertinent negatives on file. Scheduled Meds:   melatonin  6 mg Oral Nightly    NIFEdipine  60 mg Oral Daily    sodium chloride flush  5-40 mL IntraVENous 2 times per day    aspirin  325 mg Oral BID    guaiFENesin  600 mg Oral BID    atorvastatin  40 mg Oral Daily    folic acid  1 mg Oral Daily    metoprolol tartrate  50 mg Oral BID    nicotine  1 patch TransDERmal Daily    pantoprazole  40 mg Oral QAM AC    tiotropium  1 puff Inhalation Daily    thiamine  100 mg Oral Daily     Continuous Infusions:   lactated ringers 1,000 mL (10/01/22 7751)    sodium chloride       PRN Meds:sodium chloride (Inhalant), sodium chloride flush, sodium chloride, acetaminophen, oxyCODONE **OR** oxyCODONE, HYDROmorphone **OR** HYDROmorphone, LORazepam **OR** LORazepam **OR** LORazepam **OR** LORazepam **OR** LORazepam **OR** LORazepam **OR** LORazepam **OR** LORazepam, ondansetron **OR** ondansetron, polyethylene glycol, [DISCONTINUED] acetaminophen **OR** acetaminophen    No Known Allergies  Principal Problem:    Severe sepsis ([facility]  Active Problems:    Fall    Moderate malnutrition ([facility]  Resolved Problems:    * No resolved hospital problems. *    Blood pressure (!) 137/59, pulse (!) 104, temperature 98.2 °F (36.8 °C), temperature source Oral, resp. rate 18, height 5' 7\" (1.702 m), weight 142 lb 11.2 oz (64.7 kg), SpO2 93 %. Subjective  Patient seen and examined, resting in bed comfortably, pain controlled, no new complaints.   Was able to walk around the room yesterday with PT.    Objective:  Vital signs (most recent): Blood pressure (!) 137/59, pulse (!) 104, temperature 98.2 °F (36.8 °C), temperature source Oral, resp. rate 18, height 5' 7\" (1.702 m), weight 142 lb 11.2 oz (64.7 kg), SpO2 93 %. LLE - Dressing removed, incision clean, dry, intact, no calf TTP, compartments soft, neurovascularly intact distally. Mild pain in the left hip and groin with range of motion. Assessment & Plan  POD #2 left hip hemiarthroplasty , Doing well postoperatively    Continue weight bearing as tolerated. Continue range of motion exercises. Pain control. DVT prophylaxis. Continue to ice and elevate. Continue PT/OT. Discharge planning. Ortho stable for discharge, follow-up in office in 3 weeks.     Sen Tijerina DO  10/2/2022

## 2022-10-02 NOTE — CARE COORDINATION
Call from the RN on the floor. Patient has an order for a TSLO brace.  reviewed patient medical record. Patient is not able to get a TSLO brace on the weekend as the  clinic is closed on the weekend.

## 2022-10-02 NOTE — PROGRESS NOTES
[place]2.0  [facility] [place]      Name:  Kiersten José /Age/Sex:   [de-identified]74 y.o. male)   MRN & CSN:  [id number] & [id number] Encounter Date/Time: 10/2/2022 11:12 AM EDT    Location:  -A PCP: Curry Tyler MD       Hospital Day: 4    Assessment and Plan:   Kiersten José is a 74 y.o. male with L hip fracture      Plan:    Severe sepsis - ruled out     CAD - last stent was in 2016  - resume DAPT when appropriate  - currently placed on  for post-op DVT ppx    Alcohol abuse  Placed on CIWA protocol with Ativan. Left hip fracture  -arthroplasty   -uncomplicated post-op course thus far  -ARU when ready     T12 compression fracture  -Neurosurgery has been consulted. Hypertension  -Holding home nifedipine resume if necessary     NPH  -Noted on CT. As per daughter he does have some incontinence falls however that could be explained by his alcohol. We will follow-up with neurology outpatient. Ppx:  BID  Dispo: TBD    Subjective:     Chief Complaint: Fever and Other (Urinary incontance)       No complaints from patient. No adverse interval events. Prior auth for ARU in process. Review of Systems:    Review of Systems    10 point ROS negative except as stated above in \"subjective\" section    Objective:      Intake/Output Summary (Last 24 hours) at 10/2/2022 0937  Last data filed at 10/2/2022 0830  Gross per 24 hour   Intake 2128 ml   Output 700 ml   Net 1428 ml          Vitals:   Vitals:    10/02/22 0827   BP: (!) 137/59   Pulse: (!) 104   Resp: 18   Temp: 98.2 °F (36.8 °C)   SpO2: 93%       Physical Exam:     Exam not done today  ROS not obtained today     Medications:   Medications:    melatonin  6 mg Oral Nightly    NIFEdipine  60 mg Oral Daily    sodium chloride flush  5-40 mL IntraVENous 2 times per day    aspirin  325 mg Oral BID    guaiFENesin  600 mg Oral BID    atorvastatin  40 mg Oral Daily    folic acid  1 mg Oral Daily    metoprolol tartrate 50 mg Oral BID    nicotine  1 patch TransDERmal Daily    pantoprazole  40 mg Oral QAM AC    tiotropium  1 puff Inhalation Daily    thiamine  100 mg Oral Daily      Infusions:    lactated ringers 1,000 mL (10/01/22 4670)    sodium chloride       PRN Meds: sodium chloride (Inhalant), 4 mL, PRN  sodium chloride flush, 5-40 mL, PRN  sodium chloride, 500 mL, PRN  acetaminophen, 650 mg, Q4H PRN  oxyCODONE, 5 mg, Q4H PRN   Or  oxyCODONE, 10 mg, Q4H PRN  HYDROmorphone, 0.25 mg, Q3H PRN   Or  HYDROmorphone, 0.5 mg, Q3H PRN  LORazepam, 1 mg, Q1H PRN   Or  LORazepam, 1 mg, Q1H PRN   Or  LORazepam, 2 mg, Q1H PRN   Or  LORazepam, 2 mg, Q1H PRN   Or  LORazepam, 3 mg, Q1H PRN   Or  LORazepam, 3 mg, Q1H PRN   Or  LORazepam, 4 mg, Q1H PRN   Or  LORazepam, 4 mg, Q1H PRN  ondansetron, 4 mg, Q8H PRN   Or  ondansetron, 4 mg, Q6H PRN  polyethylene glycol, 17 g, Daily PRN  acetaminophen, 650 mg, Q6H PRN      Labs           Imaging/Diagnostics Last 24 Hours   CT HEAD WO CONTRAST    Result Date: 9/29/2022  EXAMINATION: CT OF THE HEAD WITHOUT CONTRAST; CT OF THE CERVICAL SPINE WITHOUT CONTRAST 9/29/2022 2:42 pm; 9/29/2022 2:43 pm TECHNIQUE: CT of the head was performed without the administration of intravenous contrast. Automated exposure control, iterative reconstruction, and/or weight based adjustment of the mA/kV was utilized to reduce the radiation dose to as low as reasonably achievable.; CT of the cervical spine was performed without the administration of intravenous contrast. Multiplanar reformatted images are provided for review. Automated exposure control, iterative reconstruction, and/or weight based adjustment of the mA/kV was utilized to reduce the radiation dose to as low as reasonably achievable. COMPARISON: 03/23/2021 HISTORY: ORDERING SYSTEM PROVIDED HISTORY: possible AMS, unknown fall TECHNOLOGIST PROVIDED HISTORY: Reason for exam:->possible AMS, unknown fall Has a \"code stroke\" or \"stroke alert\" been called? ->No Decision Support Exception - unselect if not a suspected or confirmed emergency medical condition->Emergency Medical Condition (MA) Reason for Exam: possible AMS, unknown fall Additional signs and symptoms: none Relevant Medical/Surgical History: none; ORDERING SYSTEM PROVIDED HISTORY: possible fall, possible ams TECHNOLOGIST PROVIDED HISTORY: Reason for exam:->possible fall, possible ams Decision Support Exception - unselect if not a suspected or confirmed emergency medical condition->Emergency Medical Condition (MA) Reason for Exam: possible fall, possible ams Additional signs and symptoms: none Relevant Medical/Surgical History: none FINDINGS: BRAIN/VENTRICLES: There is no acute intracranial hemorrhage, mass effect or midline shift. No abnormal extra-axial fluid collection. The gray-white differentiation is maintained without evidence of an acute infarct. There remains stable disproportionate ventricular dilatation in relation to cerebral atrophy, with progressive periventricular white matter hypoattenuation, which may suggest normal pressure hydrocephalus with associated transependymal CSF flow. There is stable chronic small vessel ischemic white matter disease. No focus of acute abnormal brain attenuation is identified. ORBITS: The visualized portion of the orbits demonstrate no acute abnormality. SINUSES: The visualized paranasal sinuses and mastoid air cells demonstrate no acute abnormality. SOFT TISSUES/SKULL:  No acute abnormality of the visualized skull or soft tissues. CERVICAL BONES/ALIGNMENT: There is no acute fracture or subluxation. There is a stable mild 2 mm degenerative retrolisthesis of C3. No additional abnormal listhesis is identified. The vertebral bodies are otherwise normal in height and alignment. The posterior elements are intact and aligned. No destructive osseous lesion is seen.  CERVICAL DEGENERATIVE CHANGES: There is stable mild multilevel spondylosis, most notable at C4 through C6. There is stable mild disc space loss and endplate sclerosis at T0-W2, C4-C5, and C5-C6. There are mild disc osteophyte protrusions at C3-C4, C4-C5, and C5-C6, without significant central canal stenosis. However, the combination of multilevel bilateral uncovertebral and facet hypertrophy leads to varying degrees of multilevel bilateral neuroforaminal stenosis. CERVICAL SOFT TISSUES: The cervical soft tissues are unremarkable. The lung apices are clear. 1. No acute intracranial abnormality. 2. Findings suggestive of normal pressure hydrocephalus. Clinical correlation is advised. 3. No acute fracture or subluxation of the cervical spine. CT CERVICAL SPINE WO CONTRAST    Result Date: 9/29/2022  EXAMINATION: CT OF THE HEAD WITHOUT CONTRAST; CT OF THE CERVICAL SPINE WITHOUT CONTRAST 9/29/2022 2:42 pm; 9/29/2022 2:43 pm TECHNIQUE: CT of the head was performed without the administration of intravenous contrast. Automated exposure control, iterative reconstruction, and/or weight based adjustment of the mA/kV was utilized to reduce the radiation dose to as low as reasonably achievable.; CT of the cervical spine was performed without the administration of intravenous contrast. Multiplanar reformatted images are provided for review. Automated exposure control, iterative reconstruction, and/or weight based adjustment of the mA/kV was utilized to reduce the radiation dose to as low as reasonably achievable. COMPARISON: 03/23/2021 HISTORY: ORDERING SYSTEM PROVIDED HISTORY: possible AMS, unknown fall TECHNOLOGIST PROVIDED HISTORY: Reason for exam:->possible AMS, unknown fall Has a \"code stroke\" or \"stroke alert\" been called? ->No Decision Support Exception - unselect if not a suspected or confirmed emergency medical condition->Emergency Medical Condition (MA) Reason for Exam: possible AMS, unknown fall Additional signs and symptoms: none Relevant Medical/Surgical History: none; ORDERING SYSTEM PROVIDED HISTORY: possible fall, possible ams TECHNOLOGIST PROVIDED HISTORY: Reason for exam:->possible fall, possible ams Decision Support Exception - unselect if not a suspected or confirmed emergency medical condition->Emergency Medical Condition (MA) Reason for Exam: possible fall, possible ams Additional signs and symptoms: none Relevant Medical/Surgical History: none FINDINGS: BRAIN/VENTRICLES: There is no acute intracranial hemorrhage, mass effect or midline shift. No abnormal extra-axial fluid collection. The gray-white differentiation is maintained without evidence of an acute infarct. There remains stable disproportionate ventricular dilatation in relation to cerebral atrophy, with progressive periventricular white matter hypoattenuation, which may suggest normal pressure hydrocephalus with associated transependymal CSF flow. There is stable chronic small vessel ischemic white matter disease. No focus of acute abnormal brain attenuation is identified. ORBITS: The visualized portion of the orbits demonstrate no acute abnormality. SINUSES: The visualized paranasal sinuses and mastoid air cells demonstrate no acute abnormality. SOFT TISSUES/SKULL:  No acute abnormality of the visualized skull or soft tissues. CERVICAL BONES/ALIGNMENT: There is no acute fracture or subluxation. There is a stable mild 2 mm degenerative retrolisthesis of C3. No additional abnormal listhesis is identified. The vertebral bodies are otherwise normal in height and alignment. The posterior elements are intact and aligned. No destructive osseous lesion is seen. CERVICAL DEGENERATIVE CHANGES: There is stable mild multilevel spondylosis, most notable at C4 through C6. There is stable mild disc space loss and endplate sclerosis at H3-O1, C4-C5, and C5-C6. There are mild disc osteophyte protrusions at C3-C4, C4-C5, and C5-C6, without significant central canal stenosis.   However, the combination of multilevel bilateral uncovertebral and facet hypertrophy leads to varying degrees of multilevel bilateral neuroforaminal stenosis. CERVICAL SOFT TISSUES: The cervical soft tissues are unremarkable. The lung apices are clear. 1. No acute intracranial abnormality. 2. Findings suggestive of normal pressure hydrocephalus. Clinical correlation is advised. 3. No acute fracture or subluxation of the cervical spine. CT LUMBAR SPINE WO CONTRAST    Result Date: 9/29/2022  EXAMINATION: CT OF THE LUMBAR SPINE WITHOUT CONTRAST  9/29/2022 TECHNIQUE: CT of the lumbar spine was performed without the administration of intravenous contrast. Multiplanar reformatted images are provided for review. Adjustment of mA and/or kV according to patient size was utilized. Automated exposure control, iterative reconstruction, and/or weight based adjustment of the mA/kV was utilized to reduce the radiation dose to as low as reasonably achievable. COMPARISON: CT from January 17, 2020 HISTORY: ORDERING SYSTEM PROVIDED HISTORY: pain, possible fall TECHNOLOGIST PROVIDED HISTORY: Reason for exam:->pain, possible fall Decision Support Exception - unselect if not a suspected or confirmed emergency medical condition->Emergency Medical Condition (MA) Reason for Exam: pain, possible fall Additional signs and symptoms: none Relevant Medical/Surgical History: none FINDINGS: BONES/ALIGNMENT: There is an fracture of the T12 vertebral body with estimated 35% vertebral body height loss. This is favored to be acute. Minimal bony retropulsion at the posterosuperior endplate measuring 4 mm. No significant bony spinal canal stenosis however. Findings are new from January 17, 2020 and favored to be acute. Increased lucency at the superior endplate is favored to be related to the fracture with osteopenia. Pathologic fractures felt less likely, however attention on follow-up. Old right rib 11 fracture with bony callus.  DEGENERATIVE CHANGES: Degenerative endplate changes most significant at L4-S1. Multilevel facet arthrosis. SOFT TISSUES/RETROPERITONEUM: No paraspinal mass is seen. Acute compression fracture of T12 with 35% vertebral body height. Mild 4 mm of bony retropulsion without significant bony spinal canal stenosis. Prominent degenerative disc disease most significant at L4-S1. CT ABDOMEN PELVIS W IV CONTRAST Additional Contrast? None    Result Date: 9/29/2022  EXAMINATION: CT OF THE ABDOMEN AND PELVIS WITH CONTRAST, 9/29/2022 6:41 pm TECHNIQUE: CT of the abdomen and pelvis was performed with the administration of intravenous contrast. Multiplanar reformatted images are provided for review. Automated exposure control, iterative reconstruction, and/or weight based adjustment of the mA/kV was utilized to reduce the radiation dose to as low as reasonably achievable. COMPARISON: Hip x-ray 09/29/2022 and CT abdomen and pelvis 01/17/2020. HISTORY: ORDERING SYSTEM PROVIDED HISTORY:  Severe sepsis. concern for abd source. TECHNOLOGIST PROVIDED HISTORY: Reason for Exam:  Severe sepsis. concern for abd source. Additional Contrast?  None Decision Support Exception - unselect if not a suspected or confirmed emergency medical condition->Emergency Medical Condition (MA) Reason for Exam:  Severe sepsis. concern for abd source. FINDINGS: Lower Chest:  Stable right lower lobe nodule given 2 years of stability, favored benign. Subpleural nodule left lung base as well also unchanged from prior examination. Also given the 2 year stability, this is of likely benign etiology. Organs: Left renal cysts. Right renal cysts. These are benign etiologies requiring no further follow-up or workup. Otherwise, mild fatty infiltration of the liver. Spleen, adrenal glands, and pancreas are unremarkable. GI/Bowel: There is moderate wall thickening along gastric antrum and duodenum can be seen with duodenitis versus antral gastritis. Favor duodenitis. Mild retained stool throughout the colon.   No evidence of bowel obstruction. Left lower quadrant inguinal hernia containing a small knuckle/looped bowel. Please correlate with direct physical exam findings. Otherwise moderate to severe colonic diverticulosis. Retained stool rectosigmoid junction delete Pelvis: Brachytherapy seeds within the prostate. Giron catheter within the bladder which is mostly compressed. Peritoneum/Retroperitoneum: No free air or free fluid. No suspicious adenopathy. Vascular calcifications. Bones/Soft Tissues: Left femur neck fracture identified as on recent x-ray of the hip. T12 compression fracture with retropulsion into the canal.     Moderate wall thickening along the duodenum and gastric antrum versus antral gastritis versus duodenitis. Please correlate exam findings. Moderate severe sigmoid colonic diverticulosis. Left inguinal hernia containing a small loop of colon. This is of uncertain clinical significance. Please correlate with exam findings. T12 compression fracture again noted. XR CHEST PORTABLE    Result Date: 9/29/2022  EXAMINATION: ONE XRAY VIEW OF THE CHEST 9/29/2022 2:22 pm COMPARISON: 03/23/2021 HISTORY: ORDERING SYSTEM PROVIDED HISTORY: fall, confusion, sob, h/o of  TECHNOLOGIST PROVIDED HISTORY: Reason for exam:->fall, confusion, sob, h/o of  Reason for Exam: FALL CONFUSION FINDINGS: The cardiac silhouette and mediastinal contours are stable. The lungs are hyperinflated, likely related to underlying emphysema. No focal lung infiltrate. No pleural effusion or pneumothorax. Old left-sided rib fracture is noted. There is also a left clavicular fracture that has incompletely healed, age indeterminate. 1. Emphysema without evidence of a focal lung infiltrate. 2. Left clavicular fracture, of uncertain age. 3. Old, healed left-sided rib fracture.      XR HIP 2-3 VW W PELVIS LEFT    Result Date: 9/29/2022  EXAMINATION: ONE XRAY VIEW OF THE PELVIS AND TWO XRAY VIEWS LEFT HIP 9/29/2022 2:24 pm COMPARISON: 03/23/2021 pelvis radiographs HISTORY: ORDERING SYSTEM PROVIDED HISTORY: pain, limited ROM, possible fall TECHNOLOGIST PROVIDED HISTORY: Reason for exam:->pain, limited ROM, possible fall Reason for Exam: PAIN FINDINGS: There is an acute fracture of the left femoral neck with associated foreshortening and mild lateral displacement of the distal fracture fragment. There is mild varus angulation of the fracture components. The femoral head is well seated within the acetabulum. No additional fractures are identified. Degenerative changes of the lower lumbar spine. Brachytherapy seeds overlie the pelvis. Acute, mildly displaced left femoral neck fracture with associated foreshortening and mild lateral displacement of the distal fracture fragment.        Electronically signed by Oswaldo Joe MD on 10/2/2022 at 9:37 AM

## 2022-10-03 NOTE — PROGRESS NOTES
Occupational Therapy      Occupational Therapy Treatment Note    Name: Nolon Harada MRN: [id number] :   1950   Date:  10/3/2022   Admission Date: 2022 Room:  -A     Primary Problem:  Severe sepsis    Restrictions/Precautions:          General Precautions, Fall Risk, WBAT on LLE, back brace for OOB activity, spinal precautions    Communication with other providers: Nursing handoff    Subjective:  Patient states:  \"I'm scared to get to the chair\"  Pain:   Location, Type, Intensity (0/10 to 10/10):  6/10 painin R hip, surgical, requesting pain medication at end of session. Nursing notified    Objective:    Observation: Pt received supine in bed, agreeable to therapy   Objective Measures:  1L of 02. 02 saturation remained WFL. Stable vitals throughout session    Treatment, including education:  Self Care Training:   Cues were given for safety, sequence, UE/LE placement, visual cues, and balance. Grooming    Therapeutic Activity Training:   Therapeutic activity training was instructed today. Cues were given for safety, sequence, UE/LE placement, awareness, and balance. Activities performed today included bed mobility training, sup-sit, sit-stand, stand step, stand to sit    Grooming washing face w/ warm washcloth Setup. Supine to sit maxA w/ instruction in log rolling for spinal precautions. Sitting EOB SBA. Applied back brace for spinal precautions. STS from EOB up to RW modA ~45 seconds in stand, stand to sit modA. STS from EOB up to RW modA, in stand ~1 minute, stand to sit modA. STS from EOB up to stand modA, stand step modA, stand to sit into reclining chair modA.      Pt sitting upright in chair, chair alarm on, call light at side, nursing notified       Assessment / Impression:    Patient's tolerance of treatment: Well  Adverse Reaction: None  Significant change in status and impact: none  Barriers to improvement: None      Plan for Next Session:    Continue OT POC    Time in: 1449  Time out:  1523  Timed treatment minutes:  34 minutes  Total treatment time:  34 minutes      Electronically signed by:    Phil ALCANTARA/CHOCO [id number]  3:49 PM,10/3/2022

## 2022-10-03 NOTE — TELEPHONE ENCOUNTER
----- Message from Cierra Wolff PA-C sent at 10/2/2022 11:17 AM EDT -----  Follow-up in 6 weeks with repeat thoracolumbar xrays for T12 fracture.

## 2022-10-03 NOTE — CARE COORDINATION
CM called [address] [place] [address] for an update on a TLSO brace. Left VM asking for a return call. Received call from the [address] [place] [address]. They will be here this afternoon. Spoke with Cristofer Wray liaison, who stated that the pre-cert was initiated yesterday as there were no therapy notes prior to that.

## 2022-10-03 NOTE — PROGRESS NOTES
Physical Therapy    Physical Therapy Treatment Note  Name: Sunny Friedman MRN: [id number] :   1950   Date:  10/3/2022   Admission Date: 2022 Room:  -A   Restrictions/Precautions:          General Precautions, Fall Risk, L LE WBAT, spinal precautions  Diagnoses of Closed fracture of neck of left femur  Communication with other providers:  per covering nurse ok to tx. Subjective:  Patient states:  pt agreeable to tx. Pt stating several times during tx \" I have to pee\". Pt shown each time that he has a catheter and ok to go ahead and urinate. Pain:   Location, Type, Intensity (0/10 to 10/10):  pt only c/o pain with moving left LE but did not rate. Objective:    Observation:  alert but confused    Treatment, including education/measures:  Ex in sup;  Trunk stretches with deep breathing  10 reps aps  10 reps glut sets  3 reps heel slide with mod assist and cues. Unable to finish tx session due to transport came to take pt for test.  Assessment / Impression:      Patient's tolerance of treatment:  good   Adverse Reaction: na  Significant change in status and impact:  na  Barriers to improvement:  confusion  Plan for Next Session:    Cont.  POC  Time in:  1325  Time out:  1340   Timed treatment minutes:  15  Total treatment time:  15    Previously filed items:  Social/Functional History  Lives With: Alone  Type of Home: House  Home Layout: One level  Home Access: Level entry  Bathroom Shower/Tub: Tub/Shower unit  Bathroom Toilet: Standard  Bathroom Equipment: Grab bars in shower  Has the patient had two or more falls in the past year or any fall with injury in the past year?: Yes  ADL Assistance: [address]: Independent (assistance w/ vacuuming only)  Ambulation Assistance: Independent  Transfer Assistance: Independent  Active : Yes  Occupation: Retired  Additional Comments: Leonel  Patient Goals   Patient Goals : return to [address] [place] [address],[address] for Short Term Goals: 1 week  Short Term Goal 1: pt will complete bed mobility at min A while maintaining spinal precautions  Short Term Goal 2: pt will complete transfers at min A  Short Term Goal 3: pt will ambulate 75 ft using FWW at min A  Short Term Goal 4: pt will verbalize and demonstrate understanding of spinal precautions    Electronically signed by:    Tara Rizo PTA  10/3/2022, 8:26 AM

## 2022-10-03 NOTE — CONSULTS
Will dictate full note  Had syncope   Check echo   Hx of CAD   Hx of smoking and etoh    Electronically signed by Karl Bhatia MD on 10/3/22 at 11:41 AM EDT    Dictated [id number]

## 2022-10-03 NOTE — PROGRESS NOTES
Neurosurgery Progress Note  10/3/2022 12:11 PM      Assessment and Plan:   Acute T12 compression fracture- concerns for fracture to be chronic per family. He has chronic pain in his back at baseline. MRI of the lumbar spine does show that his T12 fracture is acute with 3 mm of retropulsion. [facility] called and TLSO brace ordered. He is to wear this whenever upright and out of bed. Thoracolumbar x-rays reviewed with his daughter today. She understands the plan for him to follow-up in 6 weeks. Patient okay to go to ARU from neurosurgical perspective. We will sign off at this time, please reconsult if there are questions or concerns. Concern for NPH- ventriculomegaly noted on CT head 9/29/22, comparable in size on scan in March 2021. Discussed with daughter, patient is confused at times now but prior to early this week he was driving, doing his own household chores. He did not have unsteadiness or urinary incontinence. Low suspicion for NPH, but did explain that further work-up can be done outpatient for this should be begin to display more consistent symptoms. Discussed with the daughter once again that we can reevaluate him at his 6-week follow-up appointment and discuss potential work-up performed. Alcohol use- daily drinker per family, on CIWA scale. Could be contributing to his ventriculomegaly by way of generalized cerebral atrophy. Sx fixation of left femoral neck- POD 3 today       Supervising physician: Lolly Moreno. Biju Ritter MD.  Dr. Biju Ritter was readily and continuously available by phone for direct consultation regarding the care of this patient. Subjective:   Admit Date: 9/29/2022  PCP: Malina Akers MD    Patient sitting up in bed. He does not have any back pain while laying still. He states that he is interested in getting up and walking today. He has no new complaints at this time, does not have N/T in his legs.     Data:   Scheduled Meds:   magnesium oxide  400 mg Oral Daily    melatonin 6 mg Oral Nightly    NIFEdipine  60 mg Oral Daily    sodium chloride flush  5-40 mL IntraVENous 2 times per day    aspirin  325 mg Oral BID    guaiFENesin  600 mg Oral BID    atorvastatin  40 mg Oral Daily    folic acid  1 mg Oral Daily    metoprolol tartrate  50 mg Oral BID    nicotine  1 patch TransDERmal Daily    pantoprazole  40 mg Oral QAM AC    tiotropium  1 puff Inhalation Daily    thiamine  100 mg Oral Daily         I/O last 3 completed shifts: In: 2608 [P.O.:720; I.V.:1888]  Out: 1450 [Urine:1450]  No intake/output data recorded. Intake/Output Summary (Last 24 hours) at 10/3/2022 1211  Last data filed at 10/3/2022 0659  Gross per 24 hour   Intake 240 ml   Output 750 ml   Net -510 ml     CULTURE results: Invalid input(s): BLOOD CULTURE,  URINE CULTURE, SURGICAL CULTURE  CBC:   Recent Labs     09/30/22  1240 10/02/22  1610   WBC 16.2* 8.8   HGB 10.4* 9.5*    453*     BMP:    Recent Labs     09/30/22  1240 10/02/22  1610   * 127*   K 4.1 3.5   CL 93* 91*   CO2 24 27   BUN 9 14   CREATININE 0.4* 0.6*   GLUCOSE 134* 145*     Hepatic: No results for input(s): AST, ALT, ALB, BILITOT, ALKPHOS in the last 72 hours. IMAGING: available xray, CT, and MRI results reviewed    Xray thoracolumbar spine 10/2/22  Impression   Brace obscures the thoracic spine. T12 compression fracture with 50% loss of vertebral body height. No evidence   of focal malalignment.      Objective:   Vitals: /61   Pulse (!) 107   Temp 97.5 °F (36.4 °C) (Oral)   Resp 17   Ht 5' 7\" (1.702 m)   Wt 142 lb 11.2 oz (64.7 kg)   SpO2 93%   BMI 22.35 kg/m²   General appearance: alert, sitting up in bed, no distress  HEENT: Normocephalic, atraumatic, EOM intact  Neurologic: Alert to self, place, current month and birth day, speech clear and coherent, no facial droop, follows commands briskly, sensation intact in all extremities  Extremities: Lateral handgrip 5/5, bilateral lower extremities 5/5 throughout except for left iliopsoas which is 2/5  Neurosurgical Incision: none  Drains: none      Electronically signed by Lauren Buchanan PA-C on 10/3/2022 at 12:11 PM

## 2022-10-03 NOTE — PROGRESS NOTES
V2.0  [facility] Hospitalist Progress Note      Name:  Nolon Harada /Age/Sex:   (74 y.o. male)   MRN & CSN:  [id number] & [id number] Encounter Date/Time: 10/3/2022 11:12 AM EDT    Location:  [facility] PCP: Aaron Ferraro MD       Hospital Day: 5    Assessment and Plan:   Nolon Harada is a 74 y.o. male with L hip fracture      Plan: Hip fracture s/p surgery. Patient is medically ready for discharge to ARU. DAPT resumed. Lovenox 40 added. Possible syncope  - cards following     Left hip fracture  -arthroplasty   -uncomplicated post-op course thus far  -ARU when ready     T12 compression fracture  -Neurosurgery has been consulted. CAD - last stent was in   - resumed DAPT     Alcohol abuse  Placed on CIWA protocol with Ativan. Hypertension  -Holding home nifedipine resume if necessary     NPH  -Noted on CT. As per daughter he does have some incontinence falls however that could be explained by his alcohol. We will follow-up with neurology outpatient. Severe sepsis - ruled out    Ppx: DAPT, Lovenox 40  Dispo: TBD    Subjective:     Chief Complaint: Fever and Other (Urinary incontance)       No complaints from patient. No adverse interval events. D/c thomas today         Review of Systems:    Review of Systems    10 point ROS negative except as stated above in \"subjective\" section    Objective:      Intake/Output Summary (Last 24 hours) at 10/3/2022 0926  Last data filed at 10/3/2022 0659  Gross per 24 hour   Intake 480 ml   Output 750 ml   Net -270 ml          Vitals:   Vitals:    10/03/22 0845   BP: 129/61   Pulse: (!) 107   Resp: 17   Temp: 97.5 °F (36.4 °C)   SpO2: 93%       Physical Exam:     Exam not done today  ROS not obtained today     Medications:   Medications:    sodium chloride  1,000 mL IntraVENous Once    magnesium oxide  400 mg Oral Daily    melatonin  6 mg Oral Nightly    NIFEdipine  60 mg Oral Daily    sodium chloride flush  5-40 mL IntraVENous 2 times per day  aspirin  325 mg Oral BID    guaiFENesin  600 mg Oral BID    atorvastatin  40 mg Oral Daily    folic acid  1 mg Oral Daily    metoprolol tartrate  50 mg Oral BID    nicotine  1 patch TransDERmal Daily    pantoprazole  40 mg Oral QAM AC    tiotropium  1 puff Inhalation Daily    thiamine  100 mg Oral Daily      Infusions:    sodium chloride       PRN Meds: ALPRAZolam, 0.25 mg, Q6H PRN  sodium chloride (Inhalant), 4 mL, PRN  sodium chloride flush, 5-40 mL, PRN  sodium chloride, 500 mL, PRN  acetaminophen, 650 mg, Q4H PRN  oxyCODONE, 5 mg, Q4H PRN   Or  oxyCODONE, 10 mg, Q4H PRN  HYDROmorphone, 0.25 mg, Q3H PRN   Or  HYDROmorphone, 0.5 mg, Q3H PRN  LORazepam, 1 mg, Q1H PRN   Or  LORazepam, 1 mg, Q1H PRN   Or  LORazepam, 2 mg, Q1H PRN   Or  LORazepam, 2 mg, Q1H PRN   Or  LORazepam, 3 mg, Q1H PRN   Or  LORazepam, 3 mg, Q1H PRN   Or  LORazepam, 4 mg, Q1H PRN   Or  LORazepam, 4 mg, Q1H PRN  ondansetron, 4 mg, Q8H PRN   Or  ondansetron, 4 mg, Q6H PRN  polyethylene glycol, 17 g, Daily PRN  acetaminophen, 650 mg, Q6H PRN      Labs           Imaging/Diagnostics Last 24 Hours   CT HEAD WO CONTRAST    Result Date: 9/29/2022  EXAMINATION: CT OF THE HEAD WITHOUT CONTRAST; CT OF THE CERVICAL SPINE WITHOUT CONTRAST 9/29/2022 2:42 pm; 9/29/2022 2:43 pm TECHNIQUE: CT of the head was performed without the administration of intravenous contrast. Automated exposure control, iterative reconstruction, and/or weight based adjustment of the mA/kV was utilized to reduce the radiation dose to as low as reasonably achievable.; CT of the cervical spine was performed without the administration of intravenous contrast. Multiplanar reformatted images are provided for review. Automated exposure control, iterative reconstruction, and/or weight based adjustment of the mA/kV was utilized to reduce the radiation dose to as low as reasonably achievable.  COMPARISON: 03/23/2021 HISTORY: ORDERING SYSTEM PROVIDED HISTORY: possible AMS, unknown fall TECHNOLOGIST PROVIDED HISTORY: Reason for exam:->possible AMS, unknown fall Has a \"code stroke\" or \"stroke alert\" been called? ->No Decision Support Exception - unselect if not a suspected or confirmed emergency medical condition->Emergency Medical Condition (MA) Reason for Exam: possible AMS, unknown fall Additional signs and symptoms: none Relevant Medical/Surgical History: none; ORDERING SYSTEM PROVIDED HISTORY: possible fall, possible ams TECHNOLOGIST PROVIDED HISTORY: Reason for exam:->possible fall, possible ams Decision Support Exception - unselect if not a suspected or confirmed emergency medical condition->Emergency Medical Condition (MA) Reason for Exam: possible fall, possible ams Additional signs and symptoms: none Relevant Medical/Surgical History: none FINDINGS: BRAIN/VENTRICLES: There is no acute intracranial hemorrhage, mass effect or midline shift. No abnormal extra-axial fluid collection. The gray-white differentiation is maintained without evidence of an acute infarct. There remains stable disproportionate ventricular dilatation in relation to cerebral atrophy, with progressive periventricular white matter hypoattenuation, which may suggest normal pressure hydrocephalus with associated transependymal CSF flow. There is stable chronic small vessel ischemic white matter disease. No focus of acute abnormal brain attenuation is identified. ORBITS: The visualized portion of the orbits demonstrate no acute abnormality. SINUSES: The visualized paranasal sinuses and mastoid air cells demonstrate no acute abnormality. SOFT TISSUES/SKULL:  No acute abnormality of the visualized skull or soft tissues. CERVICAL BONES/ALIGNMENT: There is no acute fracture or subluxation. There is a stable mild 2 mm degenerative retrolisthesis of C3. No additional abnormal listhesis is identified. The vertebral bodies are otherwise normal in height and alignment. The posterior elements are intact and aligned.   No destructive osseous lesion is seen. CERVICAL DEGENERATIVE CHANGES: There is stable mild multilevel spondylosis, most notable at C4 through C6. There is stable mild disc space loss and endplate sclerosis at B1-T7, C4-C5, and C5-C6. There are mild disc osteophyte protrusions at C3-C4, C4-C5, and C5-C6, without significant central canal stenosis. However, the combination of multilevel bilateral uncovertebral and facet hypertrophy leads to varying degrees of multilevel bilateral neuroforaminal stenosis. CERVICAL SOFT TISSUES: The cervical soft tissues are unremarkable. The lung apices are clear. 1. No acute intracranial abnormality. 2. Findings suggestive of normal pressure hydrocephalus. Clinical correlation is advised. 3. No acute fracture or subluxation of the cervical spine. CT CERVICAL SPINE WO CONTRAST    Result Date: 9/29/2022  EXAMINATION: CT OF THE HEAD WITHOUT CONTRAST; CT OF THE CERVICAL SPINE WITHOUT CONTRAST 9/29/2022 2:42 pm; 9/29/2022 2:43 pm TECHNIQUE: CT of the head was performed without the administration of intravenous contrast. Automated exposure control, iterative reconstruction, and/or weight based adjustment of the mA/kV was utilized to reduce the radiation dose to as low as reasonably achievable.; CT of the cervical spine was performed without the administration of intravenous contrast. Multiplanar reformatted images are provided for review. Automated exposure control, iterative reconstruction, and/or weight based adjustment of the mA/kV was utilized to reduce the radiation dose to as low as reasonably achievable. COMPARISON: 03/23/2021 HISTORY: ORDERING SYSTEM PROVIDED HISTORY: possible AMS, unknown fall TECHNOLOGIST PROVIDED HISTORY: Reason for exam:->possible AMS, unknown fall Has a \"code stroke\" or \"stroke alert\" been called? ->No Decision Support Exception - unselect if not a suspected or confirmed emergency medical condition->Emergency Medical Condition (MA) Reason for Exam: possible AMS, unknown fall Additional signs and symptoms: none Relevant Medical/Surgical History: none; ORDERING SYSTEM PROVIDED HISTORY: possible fall, possible ams TECHNOLOGIST PROVIDED HISTORY: Reason for exam:->possible fall, possible ams Decision Support Exception - unselect if not a suspected or confirmed emergency medical condition->Emergency Medical Condition (MA) Reason for Exam: possible fall, possible ams Additional signs and symptoms: none Relevant Medical/Surgical History: none FINDINGS: BRAIN/VENTRICLES: There is no acute intracranial hemorrhage, mass effect or midline shift. No abnormal extra-axial fluid collection. The gray-white differentiation is maintained without evidence of an acute infarct. There remains stable disproportionate ventricular dilatation in relation to cerebral atrophy, with progressive periventricular white matter hypoattenuation, which may suggest normal pressure hydrocephalus with associated transependymal CSF flow. There is stable chronic small vessel ischemic white matter disease. No focus of acute abnormal brain attenuation is identified. ORBITS: The visualized portion of the orbits demonstrate no acute abnormality. SINUSES: The visualized paranasal sinuses and mastoid air cells demonstrate no acute abnormality. SOFT TISSUES/SKULL:  No acute abnormality of the visualized skull or soft tissues. CERVICAL BONES/ALIGNMENT: There is no acute fracture or subluxation. There is a stable mild 2 mm degenerative retrolisthesis of C3. No additional abnormal listhesis is identified. The vertebral bodies are otherwise normal in height and alignment. The posterior elements are intact and aligned. No destructive osseous lesion is seen. CERVICAL DEGENERATIVE CHANGES: There is stable mild multilevel spondylosis, most notable at C4 through C6. There is stable mild disc space loss and endplate sclerosis at M4-Q7, C4-C5, and C5-C6.   There are mild disc osteophyte protrusions at C3-C4, C4-C5, and C5-C6, without significant central canal stenosis. However, the combination of multilevel bilateral uncovertebral and facet hypertrophy leads to varying degrees of multilevel bilateral neuroforaminal stenosis. CERVICAL SOFT TISSUES: The cervical soft tissues are unremarkable. The lung apices are clear. 1. No acute intracranial abnormality. 2. Findings suggestive of normal pressure hydrocephalus. Clinical correlation is advised. 3. No acute fracture or subluxation of the cervical spine. CT LUMBAR SPINE WO CONTRAST    Result Date: 9/29/2022  EXAMINATION: CT OF THE LUMBAR SPINE WITHOUT CONTRAST  9/29/2022 TECHNIQUE: CT of the lumbar spine was performed without the administration of intravenous contrast. Multiplanar reformatted images are provided for review. Adjustment of mA and/or kV according to patient size was utilized. Automated exposure control, iterative reconstruction, and/or weight based adjustment of the mA/kV was utilized to reduce the radiation dose to as low as reasonably achievable. COMPARISON: CT from January 17, 2020 HISTORY: ORDERING SYSTEM PROVIDED HISTORY: pain, possible fall TECHNOLOGIST PROVIDED HISTORY: Reason for exam:->pain, possible fall Decision Support Exception - unselect if not a suspected or confirmed emergency medical condition->Emergency Medical Condition (MA) Reason for Exam: pain, possible fall Additional signs and symptoms: none Relevant Medical/Surgical History: none FINDINGS: BONES/ALIGNMENT: There is an fracture of the T12 vertebral body with estimated 35% vertebral body height loss. This is favored to be acute. Minimal bony retropulsion at the posterosuperior endplate measuring 4 mm. No significant bony spinal canal stenosis however. Findings are new from January 17, 2020 and favored to be acute. Increased lucency at the superior endplate is favored to be related to the fracture with osteopenia.  Pathologic fractures felt less likely, however attention on follow-up. Old right rib 11 fracture with bony callus. DEGENERATIVE CHANGES: Degenerative endplate changes most significant at L4-S1. Multilevel facet arthrosis. SOFT TISSUES/RETROPERITONEUM: No paraspinal mass is seen. Acute compression fracture of T12 with 35% vertebral body height. Mild 4 mm of bony retropulsion without significant bony spinal canal stenosis. Prominent degenerative disc disease most significant at L4-S1. CT ABDOMEN PELVIS W IV CONTRAST Additional Contrast? None    Result Date: 9/29/2022  EXAMINATION: CT OF THE ABDOMEN AND PELVIS WITH CONTRAST, 9/29/2022 6:41 pm TECHNIQUE: CT of the abdomen and pelvis was performed with the administration of intravenous contrast. Multiplanar reformatted images are provided for review. Automated exposure control, iterative reconstruction, and/or weight based adjustment of the mA/kV was utilized to reduce the radiation dose to as low as reasonably achievable. COMPARISON: Hip x-ray 09/29/2022 and CT abdomen and pelvis 01/17/2020. HISTORY: ORDERING SYSTEM PROVIDED HISTORY:  Severe sepsis. concern for abd source. TECHNOLOGIST PROVIDED HISTORY: Reason for Exam:  Severe sepsis. concern for abd source. Additional Contrast?  None Decision Support Exception - unselect if not a suspected or confirmed emergency medical condition->Emergency Medical Condition (MA) Reason for Exam:  Severe sepsis. concern for abd source. FINDINGS: Lower Chest:  Stable right lower lobe nodule given 2 years of stability, favored benign. Subpleural nodule left lung base as well also unchanged from prior examination. Also given the 2 year stability, this is of likely benign etiology. Organs: Left renal cysts. Right renal cysts. These are benign etiologies requiring no further follow-up or workup. Otherwise, mild fatty infiltration of the liver. Spleen, adrenal glands, and pancreas are unremarkable. GI/Bowel:  There is moderate wall thickening along gastric antrum and duodenum can be seen with duodenitis versus antral gastritis. Favor duodenitis. Mild retained stool throughout the colon. No evidence of bowel obstruction. Left lower quadrant inguinal hernia containing a small knuckle/looped bowel. Please correlate with direct physical exam findings. Otherwise moderate to severe colonic diverticulosis. Retained stool rectosigmoid junction delete Pelvis: Brachytherapy seeds within the prostate. Giron catheter within the bladder which is mostly compressed. Peritoneum/Retroperitoneum: No free air or free fluid. No suspicious adenopathy. Vascular calcifications. Bones/Soft Tissues: Left femur neck fracture identified as on recent x-ray of the hip. T12 compression fracture with retropulsion into the canal.     Moderate wall thickening along the duodenum and gastric antrum versus antral gastritis versus duodenitis. Please correlate exam findings. Moderate severe sigmoid colonic diverticulosis. Left inguinal hernia containing a small loop of colon. This is of uncertain clinical significance. Please correlate with exam findings. T12 compression fracture again noted. XR CHEST PORTABLE    Result Date: 9/29/2022  EXAMINATION: ONE XRAY VIEW OF THE CHEST 9/29/2022 2:22 pm COMPARISON: 03/23/2021 HISTORY: ORDERING SYSTEM PROVIDED HISTORY: fall, confusion, sob, h/o of  TECHNOLOGIST PROVIDED HISTORY: Reason for exam:->fall, confusion, sob, h/o of  Reason for Exam: FALL CONFUSION FINDINGS: The cardiac silhouette and mediastinal contours are stable. The lungs are hyperinflated, likely related to underlying emphysema. No focal lung infiltrate. No pleural effusion or pneumothorax. Old left-sided rib fracture is noted. There is also a left clavicular fracture that has incompletely healed, age indeterminate. 1. Emphysema without evidence of a focal lung infiltrate. 2. Left clavicular fracture, of uncertain age. 3. Old, healed left-sided rib fracture.      XR HIP 2-3 VW W PELVIS LEFT    Result Date: 9/29/2022  EXAMINATION: ONE XRAY VIEW OF THE PELVIS AND TWO XRAY VIEWS LEFT HIP 9/29/2022 2:24 pm COMPARISON: 03/23/2021 pelvis radiographs HISTORY: ORDERING SYSTEM PROVIDED HISTORY: pain, limited ROM, possible fall TECHNOLOGIST PROVIDED HISTORY: Reason for exam:->pain, limited ROM, possible fall Reason for Exam: PAIN FINDINGS: There is an acute fracture of the left femoral neck with associated foreshortening and mild lateral displacement of the distal fracture fragment. There is mild varus angulation of the fracture components. The femoral head is well seated within the acetabulum. No additional fractures are identified. Degenerative changes of the lower lumbar spine. Brachytherapy seeds overlie the pelvis. Acute, mildly displaced left femoral neck fracture with associated foreshortening and mild lateral displacement of the distal fracture fragment.        Electronically signed by Tucker Denton MD on 10/3/2022 at 9:26 AM

## 2022-10-03 NOTE — CARE COORDINATION
Met with patient regarding pre-cert process and discussed ARU. Explained to patient the required 3 hours of therapy a day. Also explained the average length of stay is 11 days, could be longer or shorter depending on recommendations of therapy and Dr. Dhara Rose. Patient expresses his understanding and states he's agreeable to admit to ARU. Per patient his goal is to return home at discharge from ARU. Patient requested that I discuss ARU with his daughter Emory Haynes. Called and left  for Emory Garzazahida for her to call me back to discuss ARU. Patients primary insurance is [facility] Medicare,  Pre-cert needed. Pre-cert initiated and pending at this time. Pending ref [id number]. Will continue to follow for determination.

## 2022-10-03 NOTE — PLAN OF CARE
Problem: Discharge Planning  Goal: Discharge to home or other facility with appropriate resources  Outcome: Progressing     Problem: Pain  Goal: Verbalizes/displays adequate comfort level or baseline comfort level  Outcome: Progressing  Flowsheets (Taken 10/3/2022 45 by Yvan Quick RN)  Verbalizes/displays adequate comfort level or baseline comfort level: Encourage patient to monitor pain and request assistance     Problem: Skin/Tissue Integrity  Goal: Absence of new skin breakdown  Description: 1. Monitor for areas of redness and/or skin breakdown  2. Assess vascular access sites hourly  3. Every 4-6 hours minimum:  Change oxygen saturation probe site  4. Every 4-6 hours:  If on nasal continuous positive airway pressure, respiratory therapy assess nares and determine need for appliance change or resting period. Outcome: Progressing     Problem: Safety - Adult  Goal: Free from fall injury  Outcome: Progressing     Problem: Confusion  Goal: Confusion, delirium, dementia, or psychosis is improved or at baseline  Description: INTERVENTIONS:  1. Assess for possible contributors to thought disturbance, including medications, impaired vision or hearing, underlying metabolic abnormalities, dehydration, psychiatric diagnoses, and notify attending LIP  2. [place] high risk fall precautions, as indicated  3. Provide frequent short contacts to provide reality reorientation, refocusing and direction  4. Decrease environmental stimuli, including noise as appropriate  5. Monitor and intervene to maintain adequate nutrition, hydration, elimination, sleep and activity  6. If unable to ensure safety without constant attention obtain sitter and review sitter guidelines with assigned personnel  7.  Initiate Psychosocial CNS and Spiritual Care consult, as indicated  Outcome: Progressing     Problem: Nutrition Deficit:  Goal: Optimize nutritional status  Outcome: Progressing

## 2022-10-03 NOTE — CONSULTS
[address], [address] [facility]                                  CONSULTATION    PATIENT NAME: Preeti Sarmiento                      :        1950  MED REC NO:   [id number]                          ROOM:         ACCOUNT NO:   [de-identified]                           ADMIT DATE: 2022  PROVIDER:     Todd Oconnell MD    CONSULT DATE:  10/03/2022    INDICATIONS:  Coronary artery disease. HISTORY OF PRESENT ILLNESS:  This is a 70-year-old male patient with a  history of coronary artery disease. He has had some angioplasty done. He was brought into the hospital on 2022. His daughter is a nurse  in the ICU _____. The patient said that he had a syncopal episode and  possibly, he was drunk himself. He had a moderate T12 compression  fracture present and an acute compression fracture of T12 was noted. Also, I believe, a displaced fracture of the left hip was present. The  patient is in the step down unit at this time and the patient is awake,  alert, and answers all questions. He denies any chest pain or shortness  of breath but he does not remember exactly how he fell. Possibly, he  was drinking himself versus syncopal episode. He has had an  arthroplasty done, uncomplicated process so far. The patient had an echo done back in 2016. The echo showed LV function  was slightly decreased, 40%-50% range present. He has had a heart  catheterization done and angioplasty done. His heart cath showed that  his left main was patent and LAD had 40% stenosis. Circ and OM had mild  diseases noted. RCA was stented at that time with five stents present. PAST MEDICAL HISTORY:  Coronary artery disease, status post angioplasty  of the right coronary artery. History of having anemia, rectal bleeding  in the past.  History of hypertension, hyperlipidemia, smoker, and  alcohol use also present.   He is a diabetic also, noncompliant and do  not think he follows up much with the physicians also. PAST SURGICAL HISTORY:  Angioplasty of the right coronary, colonoscopy  done, prostate surgery done, and a left hip surgery. SOCIAL HISTORY:  He does not smoke. He does not drink. MEDICATIONS:  I am not sure how much medication he is taking at home  also. ALLERGIES:  NKDA. PHYSICAL EXAMINATION:  GENERAL:  The patient is awake, alert, and is answering questions, not  in acute distress. VITAL SIGNS:  Temperature is afebrile. Pulse is 100. Blood pressure is  129/70. HEENT:  Head is atraumatic. Pupils are equal and reactive. CHEST:  Equal expansion. LUNGS:  Clear to auscultation. No wheezing or rhonchi present. HEART:  Regular rate and rhythm. ABDOMEN:  Soft and nontender. Bowel sounds are present. No  hepatosplenomegaly or guarding appreciated. EXTREMITIES:  No cyanosis or clubbing noted. NEUROLOGIC:  Cranial nerves are grossly intact. LABORATORY DATA:  His BUN is 14, creatinine 0.6. Mag is 1.5.  CBC,  hemoglobin, and platelet counts are stable. DIAGNOSTIC DATA:  X-ray Of the spine showed that he has a brace secured  throughout his spine. T12 compression fracture noted. CT of the head  with no acute process noted. IMPRESSION AND PLAN:  This is a 77-year-old male patient who came into  the hospital having a fall and possibly, he has a compression fracture  present. He also has a hip fracture present and closed fracture of left  femur. He is status post left hip hemiarthroplasty done. Continue the  therapy. From a cardiac stand, I will obtain echo, and I will readjust  his medications and we will make further recommendations based on the  hospital course.         Fide Perkins MD    D: 10/03/2022 12:27:09       T: 10/03/2022 14:09:05     [email or web address]  Job#: [id number]     Doc#: [id number]    CC:

## 2022-10-04 NOTE — CARE COORDINATION
Received denial for admission to ARU per patients insurance. There is an option for an expedited appeal.  Notified MD.  Will follow for MD response.

## 2022-10-04 NOTE — PROGRESS NOTES
Occupational Therapy  . Occupational Therapy Treatment Note    Name: Kristine Pitts MRN: [id number] :   1950   Date:  10/4/2022   Admission Date: 2022 Room:  -A   Discharge Recommendation: Inpatient Rehabilitation    Primary Problem:      Restrictions/Precautions:           General Precautions, Fall Risk, WBAT on LLE, back brace for OOB activity, spinal precautions    Communication with other providers: cotx with [facility] for assist and safety as well as patient tolerance with therapy. Subjective:  Patient states:  I dont have to go right now  Pain:   Location, Type, Intensity (0/10 to 10/10):  no numerical rating but back and LLE pain specifically during ambulation. Objective:    Observation: patient supine. Family present. Agreeable to therapy. Objective Measures:  tele- 2L o2- stable- doffed cannula to trial ambulation without o2- patient desat to mid-high 80s- but unsure how true pleth reading was. Patient did not c/o SOB. Replaced cannula. HR as high as 115. RN in to notify therapy. Treatment, including education:    ADL activity training was instructed today. Cues were given for safety, sequence, UE/LE placement, visual cues, and balance. Activities performed today included dressing,   Declines all other ADLs at this time. UB dressing- Dep to don TSLO brace. Therapeutic activity training was instructed today. Cues were given for safety, sequence, UE/LE placement, awareness, and balance. Activities performed today included bed mobility training, sup-sit, sit-stand, SPT. Supine to EOB0 via log roll- Mod x2. With cues for safety and spinal precautions. Scooting hips forward- SBA with increased effort noted. Stand to [address]- Mod x2 with cues  Functional mobility- x4 feet with increased time of x5 minutes to complete mostly d/yt pain but required  Mod x2 for safety. Very small shuffled gait pattern.    Sit to recliner- Min A for safe and slow descent plus cues.     Patient educated on role of OT , benefits of OT and rationale for therapeutic intervention. Benefit of OOB/EOB activities, benefit of movement. AE/AD, WS/EC,   Spinal precautions, log roll, benefit of brace. All therapeutic intervention performed c emphasis on dynamic balance / standing tolerance to increase strength, endurance and activity tolerance for increased [place] c ADL tasks and func transfers / mobility. Patient left safely in bedside chair at end of session, with call light in reach, alarm on and nursing aware. Gait belt was used for func transfers / mobility. Assessment / Impression:    Patient tolerated well. Pain limits patient and needs encouragement for ambulation.    Patient's tolerance of treatment: Well  Adverse Reaction: None  Significant change in status and impact: none  Barriers to improvement: weakness, pain      Plan for Next Session:    Continue with OT POC      Time in:  1430  Time out:  1510  Timed treatment minutes:  40  Total treatment time:  40      Electronically signed by:    SARAH Elizondo COTA/L [id number]    10/4/2022, 3:53 PM

## 2022-10-04 NOTE — CONSULTS
Comprehensive Nutrition Assessment    Type and Reason for Visit:  Reassess, Consult (Poor Intake/Appetite 5 or more days)    Nutrition Recommendations/Plan:   Continue current diet and supplement regime   Offer feeding assistance   Monitor PO/supplement intakes, labs, weight, fluid status     Malnutrition Assessment:  Malnutrition Status: Moderate malnutrition (10/01/22 1017)    Context:  Chronic Illness     Findings of the 6 clinical characteristics of malnutrition:  Energy Intake:  75% or less estimated energy requirements for 1 month or longer  Weight Loss:  Unable to assess     Body Fat Loss:  Mild body fat loss (moderate body fat loss) Orbital, Buccal region   Muscle Mass Loss:  Mild muscle mass loss (moderate muscle mass loss) Temples (temporalis), Clavicles (pectoralis & deltoids)  Fluid Accumulation:  Unable to assess     Strength:  Not Performed    Nutrition Assessment:    Pt continues on regular diet with standard oral nutrition supplement TID and frozen supplement BID. States he has a good appetite and enjoys the food he is being served. % meals and supplements consumed on 10/2. Pt reports that he has usually been eating over 75% of meals. No nausea/vomitting or constipation/diarrhea. Pt seems to get easily distracted, may benefit from assist feeds. Continue to monitor at high nutrition risk. Nutrition Related Findings:    Hx of drinking 35 alcoholic drinks per week, ~4-6 drinks per day based on office visit notes. Smokes 1.5 ppd. Labs Na 128, Glu 111, Cr 0.4 Wound Type: Surgical Incision       Current Nutrition Intake & Therapies:    Average Meal Intake: % (per 10/2)  Average Supplements Intake: % (per 10/2)  ADULT DIET;  Regular  ADULT ORAL NUTRITION SUPPLEMENT; Breakfast, Lunch, Dinner; Standard High Calorie/High Protein Oral Supplement  ADULT ORAL NUTRITION SUPPLEMENT; Breakfast; Standard High Calorie/High Protein Oral Supplement  ADULT ORAL NUTRITION SUPPLEMENT; Lunch, Dinner; Frozen Oral Supplement    Anthropometric Measures:  Height: 5' 7\" (170.2 cm)  Ideal Body Weight (IBW): 148 lbs (67 kg)    Admission Body Weight: 134 lb 7.7 oz (61 kg)  Current Body Weight: 142 lb 10.2 oz (64.7 kg), 96.4 % IBW.  Weight Source: Bed Scale  Current BMI (kg/m2): 22.3  Usual Body Weight: 132 lb 12.8 oz (60.2 kg) (March 2022; hx of weighing 150-160# in 2021)  % Weight Change (Calculated): 1.3  Weight Adjustment For: No Adjustment  BMI Categories: Normal Weight (BMI 22.0 to 24.9) age over 72    Estimated Daily Nutrient Needs:  Energy Requirements Based On: Kcal/kg  Weight Used for Energy Requirements: Current  Energy (kcal/day): 4416-7692 (30-35 kcal/kcal)  Weight Used for Protein Requirements: Ideal  Protein (g/day): 80-94 (1.2-1.4 g/kg)  Method Used for Fluid Requirements: 1 ml/kcal  Fluid (ml/day): 6383-5004    Nutrition Diagnosis:   Moderate malnutrition related to inadequate protein-energy intake as evidenced by moderate loss of subcutaneous fat, moderate muscle loss  Predicted inadequate energy intake related to impaired nutrient utilization (s/s alcoholism) as evidenced by poor intake prior to admission    Nutrition Interventions:   Food and/or Nutrient Delivery: Continue Current Diet, Continue Oral Nutrition Supplement  Nutrition Education/Counseling: No recommendation at this time  Coordination of Nutrition Care: Continue to monitor while inpatient, Feeding Assistance/Environment Change       Goals:  Previous Goal Met: Progressing toward Goal(s)  Goals: Meet at least 75% of estimated needs, by next RD assessment       Nutrition Monitoring and Evaluation:   Behavioral-Environmental Outcomes: None Identified  Food/Nutrient Intake Outcomes: Food and Nutrient Intake, Supplement Intake  Physical Signs/Symptoms Outcomes: Biochemical Data, GI Status, Fluid Status or Edema, Hemodynamic Status, Nutrition Focused Physical Findings, Weight, Meal Time Behavior    Discharge Planning:    Continue Oral Nutrition Supplement, Continue current diet     Maritza Denver, Dietetic Intern   Contact: [phone]

## 2022-10-04 NOTE — CARE COORDINATION
[facility]U pre-cert is still pending at this time. Will continue to follow for determination. 1018:  Received VM stating MD is requesting a P2P be performed prior to making a determination. Notified MD of the P2P option that will need to be completed by noon today. There is also an option for an expedited appeal if patient is denied. Will follow for MD wishes.

## 2022-10-04 NOTE — PROGRESS NOTES
Physical Therapy    Physical Therapy Treatment Note  Name: Annika Nair MRN: [id number] :   1950   Date:  10/4/2022   Admission Date: 2022 Room:  -A   Restrictions/Precautions:          General Precautions, Fall Risk, L LE WBAT, spinal precautions  Diagnoses of Closed fracture of neck of left femur  Communication with other providers:  Co-tx with Juliana Azar for safety with mobility and functional activities. P's kurtis( Julieta)is a nurse here and present during tx session. Pt's nurse came in during tx due to increased , O2 also dropping 79% but feel this was not a good reading due pt gripping walker and was 94% after returning to sitting in chair. Subjective:  Patient states:  pt needed education and encouragement to participate in tx session  Pain:   Location, Type, Intensity (0/10 to 10/10):  pt reports \" a little\" when asked regarding pain but pt was very guarded with gt and WB thru LLE  Objective:    Observation:  alert and oriented to self but appeared confused at times. Treatment, including education/measures:  Pt needs increased time and effort with all mobility. Sup to sit mod assist of 2  Pt dependent for donning brace sitting EOB  Sit<=>stand from bed mod assist of 2. Pt amb with rw 4-5 ft. With mod assist of 2. Pt with very small shuffling steps and needing increased time/approx 5 minutes to go short distance. Pt very guarded and appeared limited by pain. Ex in sitting trunk stretches with deep breathing . Assessment / Impression:      Patient's tolerance of treatment:  fair   Adverse Reaction: na  Significant change in status and impact:  na  Barriers to improvement:  strength and safety  Plan for Next Session:    Cont.  POC  Time in:  1430  Time out:  1510  Timed treatment minutes:  40  Total treatment time:  40    Previously filed items:  Social/Functional History  Lives With: Alone  Type of Home: House  Home Layout: One level  Home Access: Level entry  [place] Shower/Tub: Tub/Shower unit  Bathroom Toilet: Standard  Bathroom Equipment: Grab bars in shower  Has the patient had two or more falls in the past year or any fall with injury in the past year?: Yes  ADL Assistance: [address]: Independent (assistance w/ vacuuming only)  Ambulation Assistance: Independent  Transfer Assistance: Independent  Active : Yes  Occupation: Retired  Additional Comments: [employer]  Patient Goals   Patient Goals : return to [address] [place] [address]  Time Frame for Short Term Goals: 1 week  Short Term Goal 1: pt will complete bed mobility at min A while maintaining spinal precautions  Short Term Goal 2: pt will complete transfers at min A  Short Term Goal 3: pt will ambulate 75 ft using FWW at min A  Short Term Goal 4: pt will verbalize and demonstrate understanding of spinal precautions    Electronically signed by:    Becca Goyal PTA  10/4/2022, 2:28 PM

## 2022-10-04 NOTE — PROGRESS NOTES
In-Patient Progress Note    Patient:  Annika Nair 74 y.o. male MRN: [id number]     Date of Service: 10/4/2022    Hospital Day: 6      Chief complaint: had concerns including Fever and Other (Urinary incontance). Subjective   Patient seen and examined in the morning. Patient states he has a lot of spasms in his lower abdomen on the Lt. Side - feels like its his bladder. Bladder scan done - 350ml~ on scanning while I was at bedside. See ROS    I have reviewed all pertinent PMHx, PSHx, FamHx, SocialHx, medications, and allergies and updated history as appropriate. I have reviewed images as appropriate. Assessment and Plan   Annika Nair, a 74 y.o. male, with a history of alcohol abuse, hypertension, COPD, hyperlipidemia  was admitted on 9/29/2022 with complaints of had concerns including Fever and Other (Urinary incontance). Assessment  Lt. Hip fracture s/p arthroplasty (9/30/2022), cannot clinically determine if pathological fracture or osteoporotic fracture    Acute T12 Compression fracture 2/2 #4  NSY on board   TLSO brace ordered     Fall 2/2 #4 vs possible NPH  CT with ventriculomegaly  NSY - to work patient up in 6 weeks    Syncope vs pre-syncope 2/2 possibly alcohol use disorder, ?possibly orthostatic +ve  Echo and carotid US -ve. Cardiology on board   Drinks 3-4 beers a day     Hypotonic Hyponatremia 2/2 likely decreased oral intake - Improving  Na improving to 128 and Cl to 95 today     Alcohol use disorder   On CIWA   Drinks 3-4 beers/day    Normocytic Anemia 2/2 likely anemia of chronic disease   Low IRON and RIBC. Ferritin 2383  Hb 8.8 today     Leukocytosis 2/2 likely #3, #1, #4 - Resolved  Viral panel -ve    Bladder spasms vs Constipation  Spasms over lt suprapubic area with intermittent tenderness   Doubt inguinal hernia - patient without any bulging on straining/coughing. Inguinal canal empty.    States he has not had a bowel movement for a few days - states he feels like he is about to have one while I was in the room    Moderate Malnutrition present on admission     Severe sepsis ruled out    H/O CAD, HTN, HLD  On DAPT      Plan  F/U cardiology recs  Start Trospium 20mg HS - may d/c if spasms improve  Straight cath PRN if bladder scan shows >250ml  Consider urology consult if persistent residuals on bladder scan   Plan for ARU - expedited appeal signed. Ready to discharge to ARU from medicine POV      # Peptic ulcer prophylaxis: Pantoprazole  # DVT Prophylaxis: Enoxaparin  #CODE STATUS: Full      Expected Disposition: ARU  Estimated discharge date: Ready for discharge from medicine POV. He can be worked up outpatient for possible NPH per [address] [place] [address]. Review of System     Review of Systems   Constitutional:  Negative for appetite change, chills, fatigue, fever and unexpected weight change. HENT:  Negative for sinus pressure, sinus pain and sore throat. Eyes:  Negative for pain, redness and itching. Respiratory:  Negative for cough, chest tightness, shortness of breath and wheezing. Cardiovascular:  Negative for chest pain, palpitations and leg swelling. Gastrointestinal:  Negative for abdominal pain, constipation, diarrhea, nausea and vomiting. Endocrine: Negative for polydipsia, polyphagia and polyuria. Genitourinary:  Negative for decreased urine volume, difficulty urinating, dysuria, frequency, hematuria and urgency. Spasms over Lt. Suprapubic area   Musculoskeletal:  Negative for arthralgias, back pain and myalgias. Skin:  Negative for pallor and rash. Neurological:  Negative for dizziness, tremors, seizures, syncope, weakness, light-headedness, numbness and headaches. Psychiatric/Behavioral:  Negative for agitation and confusion. I have reviewed all pertinent PMHx, PSHx, FamHx, SocialHx, medications, and allergies and updated history as appropriate.     Physical Exam   VITAL SIGNS:  BP (!) 114/53   Pulse 73   Temp 98.4 °F (36.9 °C) (Oral)   Resp 20   Ht 5' 7\" (1.702 m)   Wt 142 lb 11.2 oz (64.7 kg)   SpO2 93%   BMI 22.35 kg/m²   Tmax over 24 hours:  Temp (24hrs), Av.2 °F (36.8 °C), Min:97.6 °F (36.4 °C), Max:98.5 °F (36.9 °C)      Patient Vitals for the past 6 hrs:   BP Pulse Resp SpO2 Height   10/04/22 1330 -- -- -- -- 5' 7\" (1.702 m)   10/04/22 1157 -- -- 20 -- --   10/04/22 1000 (!) 114/53 73 18 93 % --         Intake/Output Summary (Last 24 hours) at 10/4/2022 1540  Last data filed at 10/3/2022 1800  Gross per 24 hour   Intake --   Output 250 ml   Net -250 ml     Wt Readings from Last 2 Encounters:   10/02/22 142 lb 11.2 oz (64.7 kg)   21 155 lb (70.3 kg)     Body mass index is 22.35 kg/m². Physical Exam  Vitals and nursing note reviewed. Constitutional:       General: He is not in acute distress. Appearance: He is not ill-appearing, toxic-appearing or diaphoretic. HENT:      Head: Normocephalic and atraumatic. Right Ear: External ear normal.      Left Ear: External ear normal.      Nose: Nose normal.      Mouth/Throat:      Mouth: Mucous membranes are moist.      Pharynx: Oropharynx is clear. Eyes:      General: No scleral icterus. Right eye: No discharge. Left eye: No discharge. Conjunctiva/sclera: Conjunctivae normal.      Pupils: Pupils are equal, round, and reactive to light. Cardiovascular:      Rate and Rhythm: Normal rate and regular rhythm. Pulses: Normal pulses. Heart sounds: Normal heart sounds. No murmur heard. No friction rub. No gallop. Pulmonary:      Effort: Pulmonary effort is normal. No respiratory distress. Breath sounds: Normal breath sounds. No stridor. No wheezing, rhonchi or rales. Abdominal:      General: Bowel sounds are normal. There is no distension. Palpations: Abdomen is soft. There is no mass. Tenderness: There is abdominal tenderness (intermittent) in the suprapubic area. There is no guarding or rebound.       Hernia: No hernia is present. Musculoskeletal:      Right lower leg: No edema. Left lower leg: No edema. Skin:     Capillary Refill: Capillary refill takes less than 2 seconds. Neurological:      Mental Status: He is alert. Current Medications      trospium  20 mg Oral Nightly    clopidogrel  75 mg Oral Daily    aspirin  81 mg Oral Daily    enoxaparin  40 mg SubCUTAneous Nightly    losartan  25 mg Oral Daily    dorzolamide  1 drop Left Eye TID    Travoprost (CHARITO Free)  1 drop Left Eye Nightly    brimonidine  1 drop Both Eyes BID    And    timolol  1 drop Both Eyes BID    magnesium oxide  400 mg Oral Daily    melatonin  6 mg Oral Nightly    sodium chloride flush  5-40 mL IntraVENous 2 times per day    guaiFENesin  600 mg Oral BID    atorvastatin  40 mg Oral Daily    folic acid  1 mg Oral Daily    metoprolol tartrate  50 mg Oral BID    nicotine  1 patch TransDERmal Daily    pantoprazole  40 mg Oral QAM AC    tiotropium  1 puff Inhalation Daily    thiamine  100 mg Oral Daily         Labs and Imaging Studies   Laboratory findings:  Echocardiogram complete 2D with doppler with color    Result Date: 10/3/2022  Transthoracic Echocardiography Report (TTE)  Demographics   Patient Name       Raymona Seip     Date of Study       10/03/2022   Date of Birth      1950        Gender              Male   Age                74 year(s)        Race                Unknown   Patient Number     [id number]        Room Number         2027   Visit Number       [id number]   Corporate ID       [id number]   Accession Number   [id number]        Gregg Kanner, RDCS   Ordering Physician Edwina Shepard MD  Interpreting        Edwina Shepard MD                                       Physician  Procedure Type of Study   TTE procedure:ECHOCARDIOGRAM COMPLETE 2D W DOPPLER W COLOR.   Procedure Date Date: 10/03/2022 Start: 11:01 AM Study Location: Portable Technical Quality: Adequate visualization Indications:Coronary artery disease. Patient Status: Routine Height: 67 inches Weight: 142 pounds BSA: 1.75 m2 BMI: 22.24 kg/m2 HR: 91 bpm BP: 129/61 mmHg  Conclusions   Summary  Technically difficult examination due to patient position (sitting upright  and laying on right side). Left ventricular systolic function is normal.  Ejection fraction is visually estimated at 50-55%. Sclerotic, but non-stenotic aortic valve. Trace aortic regurgitation noted. Mitral annular calcification is present. No evidence of any pericardial effusion. Grade I diastolic dysfunction . Signature   ------------------------------------------------------------------  Electronically signed by Pk Hampton MD (Interpreting  physician) on 10/03/2022 at 12:33 PM  ------------------------------------------------------------------   Findings   Left Ventricle  Left ventricular systolic function is normal.  Ejection fraction is visually estimated at 50-55%. Left ventricle size is normal.  No regional wall motion abnormalities. Grade I diastolic dysfunction. Left Atrium  Essentially normal left atrium. Right Atrium  Essentially normal right atrium. Right Ventricle  Essentially normal right ventricle. Aortic Valve  Sclerotic, but non-stenotic aortic valve. Trace aortic regurgitation noted. Mitral Valve  Mitral annular calcification is present. Trace mitral regurgitation. Tricuspid Valve  Trace tricuspid regurgitation is present. Pulmonic Valve  The pulmonic valve was not well visualized. Pericardial Effusion  No evidence of any pericardial effusion. Pleural Effusion  No evidence of pleural effusion. Miscellaneous  IVC and abdominal aorta are within normal limits.   M-Mode/2D Measurements & Calculations   LV Diastolic Dimension:  LV Systolic Dimension:  LA Dimension: 3 cmAO Root  4.78 cm                  3.48 cm                 Dimension: 3.9 cmLA Area:  LV FS:27.2 %             LV Volume Diastolic: 82 9.72 cm2  LV PW Diastolic: 0.09 cm ml  LV PW Systolic: 5.52 cm  LV Volume Systolic: 37  Septum Diastolic: 4.21   ml  cm                       LV EDV/LV EDV Index: 82 RV Diastolic Dimension:  Septum Systolic: 1.96 cm OW/09 A4ZR ESV/LV ESV   2.39 cm  CO: 3.77 l/min           Index: 37 ml/21 m2  CI: 2.15 l/m*m2          EF Calculated (A4C):    LA/Aorta: 0.77                           54.9 %  LV Area Diastolic: 84.2  EF Calculated (2D):     LA volume/Index: 13 ml /7m2  cm2                      52.6 %  LV Area Systolic: 84.8  cm2                      LV Length: 7.3 cm                            LVOT: 2.2 cm  Doppler Measurements & Calculations   MV Peak E-Wave: 81.4    AV Peak Velocity: 101 cm/s   LVOT Peak Velocity:  cm/s                    AV Peak Gradient: 4.08 mmHg  72.2 cm/s  MV Peak A-Wave: 63.7    AV Mean Velocity: 74.9 cm/s  LVOT Mean Velocity: 45  cm/s                    AV Mean Gradient: 3 mmHg     cm/s  MV E/A Ratio: 1.28      AV VTI: 16.7 cm              LVOT Peak Gradient: 2  MV Peak Gradient: 2.65  AV Area (Continuity):2.48    mmHgLVOT Mean Gradient:  mmHg                    cm2                          1 mmHg   MV P1/2t: 76 msec       LVOT VTI: 10.9 cm  MVA by PHT:2.89 cm2   MV E' Septal Velocity:  10.5 cm/s  MV E' Lateral Velocity:  13.2 cm/s  MV E/E' septal: 7.75  MV E/E' lateral: 6.17      VL DUP CAROTID BILATERAL    Result Date: 10/3/2022  EXAMINATION: ULTRASOUND EVALUATION OF THE CAROTID ARTERIES 10/3/2022 TECHNIQUE: Duplex ultrasound using B-mode/gray scaled imaging, Doppler spectral analysis and color flow Doppler was obtained of the carotid arteries. COMPARISON: None HISTORY: ORDERING SYSTEM PROVIDED HISTORY:  Syncope TECHNOLOGIST PROVIDED HISTORY: Reason for Exam:  Syncope FINDINGS: RIGHT: The right common carotid artery demonstrates peak systolic velocities of 074, 69 cm/sec in the proximal and distal segments respectively.   The right common carotid artery demonstrates end-diastolic velocities of 10, 8 cm/sec in the proximal and distal segments respectively. The right internal carotid artery demonstrates the systolic velocities of 53, 65, 76 cm/sec in the proximal, mid and distal segments respectively. The right internal carotid artery demonstrates the end-diastolic velocities of 10, 14, 18 cm/sec in the proximal, mid and distal segments respectively. The external carotid artery appears patent. The vertebral artery demonstrates normal antegrade flow. No evidence of focal atherosclerotic plaque. ICA/CCA ratio of 0.7 LEFT: The left common carotid artery demonstrates peak systolic velocities of 521, 93 cm/sec in the proximal and distal segments respectively. The left common carotid artery demonstrates end-diastolic velocities of 14, 12 cm/sec in the proximal and distal segments respectively. The left internal carotid artery demonstrates the systolic velocities of 76, 75, 92 cm/sec in the proximal, mid and distal segments respectively. The left internal carotid artery demonstrates the end-diastolic velocities of 12, 15, 19 cm/sec in the proximal, mid and distal segments respectively. The external carotid artery appears patent. The vertebral artery demonstrates normal antegrade flow. There is redemonstration of focal calcified plaque within proximal internal carotid artery as seen on the prior CTA. ICA/CCA ratio of 0.9     The right internal carotid artery demonstrates 0-50% stenosis. The left internal carotid artery demonstrates 0-50% stenosis. Bilateral vertebral arteries are patent with flow in the normal direction.        Recent Results (from the past 24 hour(s))   Basic Metabolic Panel    Collection Time: 10/04/22  9:02 AM   Result Value Ref Range    Sodium 128 (L) 135 - 145 MMOL/L    Potassium 4.0 3.5 - 5.1 MMOL/L    Chloride 95 (L) 99 - 110 mMol/L    CO2 28 21 - 32 MMOL/L    Anion Gap 5 4 - 16    BUN 18 6 - 23 MG/DL    Creatinine 0.4 (L) 0.9 - 1.3 MG/DL    Glucose 111 (H) 70 - 99 MG/DL Calcium 8.5 8.3 - 10.6 MG/DL    GFR Non-African American >60 >60 mL/min/1.73m2    GFR African American >60 >60 mL/min/1.73m2   Magnesium    Collection Time: 10/04/22  9:02 AM   Result Value Ref Range    Magnesium 2.0 1.8 - 2.4 mg/dl   Phosphorus    Collection Time: 10/04/22  9:02 AM   Result Value Ref Range    Phosphorus 3.5 2.5 - 4.9 MG/DL   CBC with Auto Differential    Collection Time: 10/04/22  9:02 AM   Result Value Ref Range    WBC 8.9 4.0 - 10.5 K/CU MM    RBC 2.86 (L) 4.6 - 6.2 M/CU MM    Hemoglobin 8.8 (L) 13.5 - 18.0 GM/DL    Hematocrit 26.4 (L) 42 - 52 %    MCV 92.3 78 - 100 FL    MCH 30.8 27 - 31 PG    MCHC 33.3 32.0 - 36.0 %    RDW 12.6 11.7 - 14.9 %    Platelets 883 (H) 709 - 440 K/CU MM    MPV 9.5 7.5 - 11.1 FL    Differential Type AUTOMATED DIFFERENTIAL     Segs Relative 74.6 (H) 36 - 66 %    Lymphocytes % 7.5 (L) 24 - 44 %    Monocytes % 15.8 (H) 0 - 4 %    Eosinophils % 1.7 0 - 3 %    Basophils % 0.2 0 - 1 %    Segs Absolute 6.6 K/CU MM    Lymphocytes Absolute 0.7 K/CU MM    Monocytes Absolute 1.4 K/CU MM    Eosinophils Absolute 0.2 K/CU MM    Basophils Absolute 0.0 K/CU MM    Nucleated RBC % 0.0 %    Total Nucleated RBC 0.0 K/CU MM    Total Immature Neutrophil 0.02 K/CU MM    Immature Neutrophil % 0.2 0 - 0.43 %   Vitamin B12 & Folate    Collection Time: 10/04/22  9:02 AM   Result Value Ref Range    Vitamin B-12 517.2 211 - 911 pg/ml    Folate 14.3 3.1 - 17.5 NG/ML   Iron and TIBC    Collection Time: 10/04/22  9:02 AM   Result Value Ref Range    Iron 25 (L) 59 - 158 ug/dL    UIBC 114 110 - 370 ug/dL    TIBC 139 (L) 250 - 450 ug/dL    Transferrin % 18 10 - 44 %   Ferritin    Collection Time: 10/04/22  9:02 AM   Result Value Ref Range    Ferritin 2,383 (H) 30 - 400 NG/ML           Electronically signed by Edmond Cheek MD on 10/4/2022 at 3:40 PM

## 2022-10-04 NOTE — PROGRESS NOTES
Daily Progress Note  Subjective:    Pt. Awake, alert and feeling ok  HR stable, NSR, BP stable  No CP, SOB    Attending Note:  Patient is awake alert  No chest pain, no dyspnea  Heart rate and BP stable  Hx of CAD medical treatment  Hx of HTN on meds  Hx of etoh use and smoking  Syncope due to etoh  S/p compression fracture  S/p left hip fx and surgery   Low Na suggest renal consult and salt tablets     Impression and Plan:     Lt. Hip fracture    S/p surgery on 9/30    Going to ARU when approved    Syncope    Likely the cause of his fall    States he does not remember the event    Echo reassuring    Carotid neg. Does not appear cardiac at this time    He may have been intoxicated at the time    Cardiac status appears stable at this time  Will follow    Most Recent Echo  10/3/22  Summary   Technically difficult examination due to patient position (sitting upright   and laying on right side). Left ventricular systolic function is normal.   Ejection fraction is visually estimated at 50-55%. Sclerotic, but non-stenotic aortic valve. Trace aortic regurgitation noted. Mitral annular calcification is present. No evidence of any pericardial effusion. Grade I diastolic dysfunction . PAST MEDICAL HISTORY:  Coronary artery disease, status post angioplasty  of the right coronary artery. History of having anemia, rectal bleeding  in the past.  History of hypertension, hyperlipidemia, smoker, and  alcohol use also present. He is a diabetic also, noncompliant and do  not think he follows up much with the physicians also. PAST SURGICAL HISTORY:  Angioplasty of the right coronary, colonoscopy  done, prostate surgery done, and a left hip surgery. SOCIAL HISTORY:  He does not smoke. He does not drink. MEDICATIONS:  I am not sure how much medication he is taking at home  also. ALLERGIES:  NKDA.      Objective:   BP (!) 114/53   Pulse 73   Temp 98.4 °F (36.9 °C) (Oral)   Resp 18   Ht 5' 7\" (1.702 m)   Wt 142 lb 11.2 oz (64.7 kg)   SpO2 93%   BMI 22.35 kg/m²     Intake/Output Summary (Last 24 hours) at 10/4/2022 1019  Last data filed at 10/3/2022 1800  Gross per 24 hour   Intake --   Output 850 ml   Net -850 ml       Medications:   Scheduled Meds:   clopidogrel  75 mg Oral Daily    aspirin  81 mg Oral Daily    enoxaparin  40 mg SubCUTAneous Nightly    losartan  25 mg Oral Daily    dorzolamide  1 drop Left Eye TID    Travoprost (CHARITO Free)  1 drop Left Eye Nightly    brimonidine  1 drop Both Eyes BID    And    timolol  1 drop Both Eyes BID    magnesium oxide  400 mg Oral Daily    melatonin  6 mg Oral Nightly    sodium chloride flush  5-40 mL IntraVENous 2 times per day    guaiFENesin  600 mg Oral BID    atorvastatin  40 mg Oral Daily    folic acid  1 mg Oral Daily    metoprolol tartrate  50 mg Oral BID    nicotine  1 patch TransDERmal Daily    pantoprazole  40 mg Oral QAM AC    tiotropium  1 puff Inhalation Daily    thiamine  100 mg Oral Daily      Infusions:   sodium chloride 20 mL (10/03/22 1250)      PRN Meds:  diphenoxylate-atropine, ALPRAZolam, sodium chloride (Inhalant), sodium chloride flush, sodium chloride, acetaminophen, oxyCODONE **OR** oxyCODONE, HYDROmorphone **OR** HYDROmorphone, LORazepam **OR** LORazepam **OR** LORazepam **OR** LORazepam **OR** LORazepam **OR** LORazepam **OR** LORazepam **OR** LORazepam, ondansetron **OR** ondansetron, polyethylene glycol, [DISCONTINUED] acetaminophen **OR** acetaminophen     Physical Exam:  Vitals:    10/04/22 1000   BP: (!) 114/53   Pulse: 73   Resp: 18   Temp:    SpO2: 93%        General: AAO, NAD  Chest: Nontender  Cardiac: First and Second Heart Sounds are Normal, No Murmurs or Gallops noted  Lungs:Clear to auscultation and percussion. Abdomen: Soft, NT, ND, +BS  Extremities: No clubbing, no edema  Vascular:  Equal 2+ peripheral pulses.     Lab Data:  CBC:   Recent Labs     10/02/22  1610 10/04/22  0902   WBC 8.8 8.9   HGB 9.5* 8.8*   HCT 28.2* 26.4*   MCV 89.8 92.3   * 542*     BMP:   Recent Labs     10/02/22  1610 10/04/22  0902   * 128*   K 3.5 4.0   CL 91* 95*   CO2 27 28   PHOS  --  3.5   BUN 14 18   CREATININE 0.6* 0.4*     LIVER PROFILE: No results for input(s): AST, ALT, LIPASE, BILIDIR, BILITOT, ALKPHOS in the last 72 hours. Invalid input(s): AMYLASE,  ALB  PT/INR: No results for input(s): PROTIME, INR in the last 72 hours. APTT: No results for input(s): APTT in the last 72 hours. BNP:  No results for input(s): BNP in the last 72 hours.       Assessment:  Patient Active Problem List    Diagnosis Date Noted    NSTEMI (non-ST elevated myocardial infarction) ([facility] 09/13/2016    Moderate malnutrition ([facility] 10/01/2022    Fall 09/30/2022    Severe sepsis ([facility] [address] 09/29/2022    COPD (chronic obstructive pulmonary disease) ([facility] [address] 09/13/2016    Essential hypertension 09/13/2016    Hyperlipidemia 09/13/2016    Glaucoma 09/13/2016    Anxiety 09/13/2016    Diverticulosis of colon with hemorrhage 01/17/2020       Electronically signed by Karen Pérez PA-C on 10/4/2022 at 10:19 AM    Electronically signed by Odette Gregory MD on 10/4/22 at 11:15 AM EDT

## 2022-10-04 NOTE — PROGRESS NOTES
Physician Progress Note      Buddy Constantino  [facility] #:                  [id number]  :                       1950  ADMIT DATE:       2022 1:18 PM  [address] [place] DATE:  RESPONDING  PROVIDER #:        Jesus Rocha MD          QUERY TEXT:    Hospitalists,    Pt admitted with left hip fracture and. Pt noted to have osteopenia. If   possible, please document in progress notes and discharge summary if you are   evaluating and/or treating any of the following: The medical record reflects the following:  Risk Factors: ETOH abuse,  Clinical Indicators: degenerative changes, PMH of prostate CA w/pelvic   brachytherapy seeds present, currently has thoracic compression fx w/   osteopenia and 35% vertebral height , moderate malnutrition documented by the   Dietitian  Treatment: labs, imaging, Ortho & Neurosug consult, fixation of hip fx    Thank you,  Fatoumata Bauman RN [facility]  [phone]  Options provided:  -- Pathological Left hip fracture due to radiation  -- Pathological left hip fracture due to ##please specify, please specify. -- Pathological Left hip fracture due to osteopenia  -- Pathological Left hip fracture due to osteopenia following fall which would   not usually break a normal, healthy bone  -- Osteoporotic Left hip fracture  -- Osteoporotic Left hip fracture following fall which would not usually break   a normal, healthy bone  -- Traumatic Left hip fracture  -- Other - I will add my own diagnosis  -- Disagree - Not applicable / Not valid  -- Disagree - Clinically unable to determine / Unknown  -- Refer to Clinical Documentation Reviewer    PROVIDER RESPONSE TEXT:    Provider is clinically unable to determine a response to this query.     Query created by: Deandre Waldron on 10/3/2022 4:24 PM      Electronically signed by:  Jesus Rocha MD 10/4/2022 9:16 AM

## 2022-10-04 NOTE — PROGRESS NOTES
Physician Progress Note      Yuridia Urbina  CSN #:                  [id number]  :                       1950  ADMIT DATE:       2022 1:18 PM  [address] [place] DATE:  RESPONDING  PROVIDER #:        Tommy Hernandes MD          QUERY TEXT:    Hospitalists,    Pt admitted with left hip fracture, thoracic compression fracture  and has   moderate malnutrition documented by the Dietitian consultant.   If possible,   please document in progress notes and discharge summary:    The medical record reflects the following:  Risk Factors: chronic illness, ETOH abuse  Clinical Indicators: Nutrition Assessment:  visited pt at bedside, pt on regular diet with standard oral nutrition   supplements ordered, no po intake documented since admission, pt reports UBW   145# in 2022, reports weight loss, unable to verify weight loss with   comprehensive weight hx, reports decreased appetite for greater than 1 month,   denies any chewing or swallowing difficulty, pt states that he likes Ensure   supplements, pt also agreeable to trial frozen oral nutrition supplements,   encouraged po intake/supplement consumption, NFPE completed, pt meets criteria   for malnutrition, will follow at high nutrition risk. -In context of chronic   illness, Moderate malnutrition related to inadequate protein-energy intake as   evidenced by moderate loss of subcutaneous f  Treatment: labs, imaging, Dietitian consult, ONS< I&O, weights    Thank you,  Jose Galeano RN CDS  [phone]  Options provided:  -- Moderate malnutrition confirmed present on admission  -- Moderate malnutrition confirmed not present on admission  -- Moderate malnutrition  ruled out  -- Other - I will add my own diagnosis  -- Disagree - Not applicable / Not valid  -- Disagree - Clinically unable to determine / Unknown  -- Refer to Clinical Documentation Reviewer    PROVIDER RESPONSE TEXT:    The diagnosis of moderate malnutrition was confirmed as present on admission.     Query created by: Toyin Francois on 10/3/2022 4:29 PM      Electronically signed by:  Deny Damon MD 10/4/2022 3:39 PM

## 2022-10-05 NOTE — PROGRESS NOTES
Daily Progress Note  Subjective:    Pt. Awake, alert and feeling ok on exam this am  HR stable, NSR, BP stable  No CP, SOB    Attending Note:  Patient is doing better this morning-heart rate slightly elevated but he is awake alert  He was on the bedpan when I saw him earlier today  Rapid response team was called -patient became bradycardic then asystole and passed away  History of alcohol use  History of coronary disease recent cardiac work-up LV function was normal on echo    Impression and Plan:     Lt. Hip fracture    S/p surgery on 9/30    Per ortho recs     Syncope    Likely the cause of his fall    States he does not remember the event    Echo reassuring    Carotid neg. Does not appear cardiac at this time    He may have been intoxicated at the time     BP is low but he is tachy-need to watch for signs of withdrawal  Will stop losartan and add midodrine this am, keep lopressor dosing the same for now-will monitor  Cardiac status appears stable at this time  Will follow    Most Recent Echo  10/3/22  Summary   Technically difficult examination due to patient position (sitting upright   and laying on right side). Left ventricular systolic function is normal.   Ejection fraction is visually estimated at 50-55%. Sclerotic, but non-stenotic aortic valve. Trace aortic regurgitation noted. Mitral annular calcification is present. No evidence of any pericardial effusion. Grade I diastolic dysfunction . PAST MEDICAL HISTORY:  Coronary artery disease, status post angioplasty  of the right coronary artery. History of having anemia, rectal bleeding  in the past.  History of hypertension, hyperlipidemia, smoker, and  alcohol use also present. He is a diabetic also, noncompliant and do  not think he follows up much with the physicians also. PAST SURGICAL HISTORY:  Angioplasty of the right coronary, colonoscopy  done, prostate surgery done, and a left hip surgery.      SOCIAL HISTORY:  He does not smoke.  He does not drink. MEDICATIONS:  I am not sure how much medication he is taking at home  also. ALLERGIES:  NKDA. Objective:   BP (!) 80/50   Pulse (!) 108   Temp 98.3 °F (36.8 °C)   Resp 27   Ht 5' 7\" (1.702 m)   Wt 142 lb 11.2 oz (64.7 kg)   SpO2 (!) 87%   BMI 22.35 kg/m²   No intake or output data in the 24 hours ending 10/05/22 0845    Medications:   Scheduled Meds:   trospium  20 mg Oral Nightly    clopidogrel  75 mg Oral Daily    aspirin  81 mg Oral Daily    enoxaparin  40 mg SubCUTAneous Nightly    losartan  25 mg Oral Daily    dorzolamide  1 drop Left Eye TID    Travoprost (CHARITO Free)  1 drop Left Eye Nightly    brimonidine  1 drop Both Eyes BID    And    timolol  1 drop Both Eyes BID    magnesium oxide  400 mg Oral Daily    melatonin  6 mg Oral Nightly    sodium chloride flush  5-40 mL IntraVENous 2 times per day    guaiFENesin  600 mg Oral BID    atorvastatin  40 mg Oral Daily    folic acid  1 mg Oral Daily    metoprolol tartrate  50 mg Oral BID    nicotine  1 patch TransDERmal Daily    pantoprazole  40 mg Oral QAM AC    tiotropium  1 puff Inhalation Daily    thiamine  100 mg Oral Daily      Infusions:   sodium chloride 20 mL (10/03/22 1250)      PRN Meds:  diphenoxylate-atropine, ALPRAZolam, sodium chloride (Inhalant), sodium chloride flush, sodium chloride, acetaminophen, oxyCODONE **OR** oxyCODONE, HYDROmorphone **OR** HYDROmorphone, LORazepam **OR** LORazepam **OR** LORazepam **OR** LORazepam **OR** LORazepam **OR** LORazepam **OR** LORazepam **OR** LORazepam, ondansetron **OR** ondansetron, polyethylene glycol, [DISCONTINUED] acetaminophen **OR** acetaminophen     Physical Exam:  Vitals:    10/05/22 0600   BP:    Pulse: (!) 108   Resp: 27   Temp:    SpO2: (!) 87%        General: AAO, NAD  Chest: Nontender  Cardiac: First and Second Heart Sounds are Normal, No Murmurs or Gallops noted  Lungs:Clear to auscultation and percussion.   Abdomen: Soft, NT, ND, +BS  Extremities: No clubbing, no edema  Vascular:  Equal 2+ peripheral pulses. Lab Data:  CBC:   Recent Labs     10/02/22  1610 10/04/22  0902 10/05/22  0732   WBC 8.8 8.9 15.6*   HGB 9.5* 8.8* 6.0*   HCT 28.2* 26.4* 18.6*   MCV 89.8 92.3 94.4   * 542* 524*     BMP:   Recent Labs     10/02/22  1610 10/04/22  0902 10/05/22  0732   * 128* 132*   K 3.5 4.0 4.7   CL 91* 95* 97*   CO2 27 28 21   PHOS  --  3.5 6.5*   BUN 14 18 44*   CREATININE 0.6* 0.4* 1.2     LIVER PROFILE: No results for input(s): AST, ALT, LIPASE, BILIDIR, BILITOT, ALKPHOS in the last 72 hours. Invalid input(s): AMYLASE,  ALB  PT/INR: No results for input(s): PROTIME, INR in the last 72 hours. APTT: No results for input(s): APTT in the last 72 hours. BNP:  No results for input(s): BNP in the last 72 hours.       Assessment:  Patient Active Problem List    Diagnosis Date Noted    NSTEMI (non-ST elevated myocardial infarction) ([facility] 09/13/2016    Moderate malnutrition ([facility] 10/01/2022    Fall 09/30/2022    Severe sepsis ([facility] 09/29/2022    COPD (chronic obstructive pulmonary disease) ([facility] 09/13/2016    Essential hypertension 09/13/2016    Hyperlipidemia 09/13/2016    Glaucoma 09/13/2016    Anxiety 09/13/2016    Diverticulosis of colon with hemorrhage 01/17/2020       Electronically signed by Shwetha Mendoza PA-C on 10/5/2022 at 8:45 AM    Electronically signed by Lucia Elizalde MD on 10/5/22 at 12:04 PM EDT

## 2022-10-05 NOTE — SIGNIFICANT EVENT
Rapid Response Team Note  Date of event: 10/5/2022   Time of event: Marlynterine Laughter Pratik 74y.o. year old male   YOB: 1950   Admit date:  9/29/2022   Location: 2112/2112-A   Witnessed? : [x]Yes  [] No  Monitored? : [x]Yes  [] No  Code status: [x] Full  [] DNR-CCA  []DNR-CC  ______________________________________________________________________  Reason for RRT:    [] RR < 8     [] RR > 28   [] SpO2 <90%   [] HR < 40 bpm   [] HR > 130 bpm  [] SBP < 90 mmHg    [x] SpO2 <90%   [x] LOC   [] Seizures    [] Significant Bleeding Event    [] Other: vomiting    Subjective:   Called to bedside regarding the above event, patient was saturating 30% on room air. Patient with heart rate >120 when seen. Concern for aspiration with significant respiratory distress. Noted Hb of  6.0 and patient appears pale. During the course of the RRT, patient is answering some questions with a saturation of 60%. Plan to intubate and send STAT labs. Also transfuse 1U. Objective:   Vital signs:   Vitals:    10/05/22 0300 10/05/22 0400 10/05/22 0500 10/05/22 0600   BP:       Pulse: (!) 101 (!) 105 (!) 107 (!) 108   Resp:   30 27   Temp:  98.3 °F (36.8 °C)     TempSrc:       SpO2:  93%  (!) 87%   Weight:       Height:         Initial Condition:  Conscious   [x] Yes  [] No     Breathing [x] Yes  [] No     Pulse  [x] Yes  [] No    Physical Exam  Constitutional:       Appearance: He is ill-appearing. HENT:      Head: Normocephalic and atraumatic. Cardiovascular:      Rate and Rhythm: Regular rhythm. Tachycardia present. Pulses: Decreased pulses. Pulmonary:      Effort: Tachypnea, accessory muscle usage and respiratory distress present. Breath sounds: Decreased air movement and transmitted upper airway sounds present. Examination of the right-lower field reveals decreased breath sounds and rhonchi. Decreased breath sounds (Rt. base) and rhonchi present. No wheezing or rales.    Neurological:      Mental Status: He is lethargic. Psychiatric:         Behavior: Behavior is cooperative. Diagnostic Test:  Blood Sugar:  171 mg/dL  EKG:    pending  ABG:    No results found for: PH, PCO2, PO2, HCO3, O2SAT    Laboratory Tests:   Ordered      Teams Assessment and Plan:  Respiratory distress  Acute hypoxic respiratory failure 2/2 likely aspiration   SRAVANTHI  Severe anemia, r/o GI bleed    Intubate. ICU transfer  STAT labs   Transfuse blood  CT abdomen and pelvis w/o contrast STAT  Discussed with CCM  ? ?  ? Disposition:  [] No transfer   [] Transfer to monitor floor  [x] Transfer to: [x] MICU [] NICU [] CCU [] SICU    Patients family updated: [x] Yes  [] No       Due to the immediate potential for life-threatening deterioration due to hypoxia , I spent 19 minutes providing critical care. This time is excluding time spent performing procedures.     Electronically signed by Morgan Rincon MD on 10/5/2022 at 9:20 AM

## 2022-10-05 NOTE — PROGRESS NOTES
Pt transferred to ICU room 2112 at this time. Pt transported to room with Charge RN Sommer Mckay and Jolynn Kessler RT. Pt connected to ventilator. Settings AC/VC, rate 14, Vt 450, fio2 100% and PEEP 10. Skin assessment performed with Tonya Estrada RN. No open areas noted. Incision noted to left hip. Charlie Lo NP at bedside to place arterial line and CVC. ETT measured 23 @ lip and OG measured 65 @ lip. Pt daughter David Vazquez at bedside. Full update given. POA paperwork placed in patient soft chart.

## 2022-10-05 NOTE — PROGRESS NOTES
Respiratory Care Intubation Progress Note    Patient was pre-oxygenated prior to intubation with 100% 02 via bvm. Patient was intubated per [facility] Paci with a size MAC 4 blade. The Endotracheal tube used was a size 7.5mm and a stylet was placed in the ET tube. Video visualization of the cords was observed and the tube was passed through the cords. The stylet was removed, air added to cuff via minimal leak technique, condensation in the tube was observed, equal breath sounds were heard, and there was positive color change on the C02 detector. Tube was secured at 24 cm at the lip. A chest x-ray was ordered per the doctor.        Attempts: 02      Complications: none      Teeth: none    Electronically signed by Gloria Waller RCP on 10/5/2022 at 11:16 AM

## 2022-10-05 NOTE — DISCHARGE SUMMARY
Discharge Summary    Name:  Kristine Pitts /Age/Sex: 89  [de-identified]74 y.o. male)   MRN & CSN:  [id number] & [id number] Admission Date/Time: 2022  1:18 PM   Attending:  Dani Castillo MD Discharging Physician: Dani Castillo MD       Admission Diagnosis:   Severe sepsis  Alcohol abuse  Left hip fracture  T12 compression fracture  Hypertension  ? NPH    Discharge Diagnosis:    Aspiration pneumonia with severe hypoxia, bradycardia resulting in death     Lt. Hip fracture s/p arthroplasty (2022), cannot clinically determine if pathological fracture or osteoporotic fracture  Acute T12 Compression fracture 2/2 #4  Fall 2/2 #4 vs possible NPH  Syncope vs pre-syncope 2/2 possibly alcohol use disorder, ?possibly orthostatic +ve  Hypotonic Hyponatremia 2/2 likely decreased oral intake - Improving  Alcohol use disorder   Normocytic Anemia 2/2 likely acute upper GI bleed in the setting of anemia of chronic disease   Leukocytosis 2/2 likely #3, #1, #4 - Resolved  Bladder spasms vs Constipation  Moderate Malnutrition present on admission   Severe sepsis ruled out   H/O CAD, HTN, HLD      Discharge Exam  BP (!) 80/50   Pulse (!) 108   Temp 98.3 °F (36.8 °C)   Resp 14   Ht 5' 7\" (1.702 m)   Wt 142 lb 11.2 oz (64.7 kg)   SpO2 (!) 30%   BMI 22.35 kg/m²     Patient passed away at 10:49 AM on 10/5/2022. Hospital Course:   Kristine Pitts is a 74 y.o.  male  who presents with Severe sepsis [facility])    Patient admitted on 2022. Per H+P:   Kristine Pitts is a 74 y.o. male with pmh of alcohol abuse, hypertension, COPD, lipidemia who presents with fall at home. Daughter reports when she tried to call him to check up on him and his phone was not working when she went down to go see him he was found on the floor unable to get up. He does not recall falling. Did not reports he has been having fevers for the last few days. Denies any chills.   Reports he has not had much of an appetite and he drinks 3-4 beers a day. Denies any cough, shortness of breath, nausea, vomiting, weakness, dizziness. Denies any dysuria    Patient underwent Lt. Hip arthroplasty on 9/30/2022 for Lt. Hip fracture. He was found to have acute T12 compression fracture. He came in with syncope vs pre-syncope. Patient was improving and was pending ARU discharge. Patient developed bladder spasms on 10/4/2022 and was placed on antispasmodics. On the evening of 10/4/2022 - patient developed an episode of vomiting. CT head was done - was negative. Patient, in the AM of 10/5/2022 - RRT was called due to SpO2 down to 30% with vomiting. Patient was alert after bagging and being placed on NRB. Patient was only saturating 60-70%. He agreed to intubation. Patient was emergently intubated and transferred to the ICU. Noted Hb drop down to 6 followed by 5.8 from 8.8 yesterday. Also noted Cr worse than yesterday. Emergent CT abdomen w/o contrast was ordered - concern for possible upper GI bleed. Family changed CODE STATUS to [facility]. Patient was bradycardic again with weak, thready pulse. Was given atropine 1mg and 1 amp NaHCO3 - patient subsequently went into PEA/asystole. Time of death called at 10:49 AM.  to sign the death certificate. The patient expressed appropriate understanding of and agreement with the discharge recommendations, medications, and plan.      Consults this admission:  IP CONSULT TO ORTHOPEDIC SURGERY  IP CONSULT TO NEUROSURGERY  IP CONSULT TO HOSPITALIST  IP CONSULT TO PHARMACY  IP CONSULT TO SOCIAL WORK  IP CONSULT TO CARDIOLOGY  IP CONSULT TO DIETITIAN  IP CONSULT TO CRITICAL CARE      Discharge Instruction:   Patient passed away    Objective Findings at Discharge:       BMP/CBC  Recent Labs     10/04/22  0902 10/05/22  0732 10/05/22  0900   * 132* 132*   K 4.0 4.7 4.9   CL 95* 97* 96*   CO2 28 21 20*   BUN 18 44* 46*   CREATININE 0.4* 1.2 1.3   WBC 8.9 15.6* 16.3*   HCT 26.4* 18.6* 17.5* * 524* 523*       IMAGING:  CT abdomen and pelvis 10/5/2022   1. No evidence of retroperitoneal bleeding. 2. High density material in the duodenum and proximal jejunum nonspecific. If there is clinical suspicion for upper GI bleed, recommend CTA. 3. Questionable pneumatosis involving the duodenum. Alternatively this could   be the result of inspissated material in the duodenal lumen. 4. New infectious/inflammatory airways process at the lung bases. Additional Information: Patient seen and examined day of discharge.  For more information regarding patient's care please contact Jaswinder Campos [address] [place] [address] [phone]    Discharge Time of 40 minutes    Electronically signed by Cecil Ovalle MD on 10/5/2022 at 11:59 AM

## 2022-10-05 NOTE — PROGRESS NOTES
Pt taken down to CT. Completed CT of abdomen and pelvis. Received call from tele that pt had dropped HR from 120's to 60-70's. Pulse found at this time. X-ray of chest completed. Pt HR dropped down to the 40's. Still pulse found. Selwyn Peter at bedside in [address]. Atropine and bicarb given at this time. Pt continued to nilesh down into the 20-30's and then went asystole. Pt daughter Bernie Spurling on the phone with nurse at the time pt went asystole. States she does not want CPR started and to let patient pass peacefully. Selwyn Peter notified and time of death pronounced at 36. Family at bedside to see patient.

## 2022-10-05 NOTE — PROGRESS NOTES
Spoke Carolin Loomis at [facility]. States they will make him a coroncers case but will not be bringing him in for an evaluation. [facility] and [facility] will [facility].

## 2022-10-05 NOTE — PROGRESS NOTES
Central Line Placement Procedure Note    Indication: vascular access, poor peripheral access, and centrally administered medications    Consent: Unable to be obtained due to the emergent nature of this procedure. Procedure: The patient was positioned appropriately and the skin over the right internal jugular vein was prepped with Chloraprep. Local anesthesia was obtained by infiltration using 1% Lidocaine without epinephrine. A large bore needle was used to identify the vein. A guide wire was then inserted into the vein through the needle. A triple lumen catheter was then inserted into the vessel over the guide wire using the Seldinger technique. All ports showed good, free flowing blood return and were flushed with saline solution. The catheter was then securely fastened to the skin with sutures and with an adhesive dressing and covered with a sterile dressing. A post procedure X-ray was not indicated. The patient tolerated the procedure well.     Complications: None      Liliane GOOD-ACNP  Critical Care Nurse Practitioner   [facility] [place]

## 2022-10-05 NOTE — PROGRESS NOTES
Brief critical care note:    Patient was a rapid response for acute hypoxemic respiratory failure, hemorrhagic shock. Suspected large volume aspiration event. He was emergently intubated and transferred to the ICU. Assessment:   Hemorrhagic shock  Acute Blood Loss Anemia  Acute Hypoxemic Respiratory Failure  Aspiration Pneumonia  Acute Metabolic Encephalopathy    Plan:   Transfuse 2u PRBCs now  Check hepatic panel, lactic acid, coags. Serial H/H. Stat CT A/P  Hold all anti platelet therapy  Hold Lovenox  Fluid resuscitation. Norepinephrine - titrate to maintain MAP>65. Protonix 80 mg IV now, then 40 mg BID  Vent bundle  Post intubation ABG  Unable to tolerate propofol infusion  Start Fentanyl infusion with PRN Fentanyl and Versed IVP  OG to LIWS. Unasyn IV every 6h.    1043 - Called urgently to CT scan for patient with sudden bradycardia. On arrival, the patient's heart rate is 40. Weak, thready palpable pulse. Atropine 1mg and 1 amp of sodium bicarb were given, however, the patient progressed to PEA/asystole. Per the patient's daughters wishes Christina Childress) no resuscitative measures were initiated.  Time of death was called on 10/5/2022 at 1049am.

## 2022-10-05 NOTE — PROGRESS NOTES
Intubation Procedure Note  Diagnosis: Acute respiratory failure   Indication: Respiratory failure and hypoxia    Consent: Unable to be obtained due to the emergent nature of this procedure. Medications Used: etomidate 20 mg intravenously and rocuronium 50 mg intravenously    Procedure: The patient was placed in the appropriate position. Cricoid pressure was utilized. Intubation was performed by direct laryngoscopy using a laryngoscope and a 7.5 cuffed endotracheal tube. The cuff was then inflated and the tube was secured appropriately at a distance of 24 cm to the dental ridge. Initial confirmation of placement included bilateral breath sounds, an end tidal CO2 detector, tube fogging, adequate chest rise, adequate pulse oximetry reading, and improved skin color. A chest x-ray to verify correct placement of the tube showed appropriate tube position. The patient tolerated the procedure well.      Complications: None    Michell GOOD-ACNP  Critical Care Nurse Practitioner   [facility] [place]

## 2022-10-05 NOTE — CARE COORDINATION
Per recent progress note Code blue was called on patient. Per MD note patient is to be intubated and transferred to ICU. MD signed expedited appeal letter yesterday. Will cancel expedited appeal d/t patient not being medically ready at this time. Due to patients change in medical status will need updated PT/OT notes closer to patient being medically stable and if ARU appropriate a new pre-cert will need to be initiated at that time. ARU will continue to follow.

## 2022-10-05 NOTE — PROGRESS NOTES
[facility] called at this time. Referral #: [id number]. Nursing supervisor notified.  notified. Perfect serve sent to Dr. Desi Giordano, Dr. Aristeo Ford, Dr. Valentino Flores, and Paul COSTELLO.

## 2022-10-05 NOTE — PLAN OF CARE
10/5 @ 0849: Arrived Portable at patient room when nurse called Vince Wilson and rapid response arrival. Unable to do US at this time.  Will wait till pateint returns and maintains stability to perform [address]

## 2022-10-05 NOTE — CONSULTS
Consult Note 10/05/22        NAME: Mor Parr  : [birth year]  MRN: [id number]      Assessment/Plan:  Mor Parr is a 74 y.o. male     Acute respiratory failure with hypoxia  Aspiration Pneumonia  Acute blood loss anemia (?GI bleed/RP bleed)  S/P repair hip Fx  SRAVANTHI  CAD  HTN  HLD  ETOH use      Intubation  Transport to ICU  CVC placement  Serial H/H, renal function, electrolye determination. History of Present Illness:    Abrupt onset of hypoxia, emesis, \"code assist\" summoned overhead, patient subsequently intubated under my supervision, transferred to ICU setting. Given acute blood loss anemia, etiology uncertain, transported to CT for evaluation of RP bleed. Prior to transport, CVC placed given limited access, evolving hypotension. Following hemodynamic stabilization, and following abd/pelvic CT scan and following discussion with family, code status changed to DNR CCA. Pulseless/apnea noted at [id number] hence pronounced dead at that time.       [facility]  [phone]    ROS:    Review of Systems     Unable to obtain due to clinical status    Past Medical, Surgical, Social, Family History:   Past Medical History:   Diagnosis Date    Anxiety     Cancer ([facility] [address]     prostate    COPD (chronic obstructive pulmonary disease) ([facility] [address]     Glaucoma     Hyperlipidemia     Hypertension      Past Surgical History:   Procedure Laterality Date    COLONOSCOPY      COLONOSCOPY N/A 2020    COLONOSCOPY CONTROL HEMORRHAGE/BIOPSY performed by Seth Leon MD at [address] Left 2022    HIP HEMIARTHROPLASTY performed by Jamar Reynolds DO at [address] [facility]      radiation seeds implanted by Dr Haddad Lowers History     Socioeconomic History    Marital status:      Spouse name: Not on file    Number of children: Not on file    Years of education: Not on file    Highest education level: Not on file   Occupational History    Not on file   Tobacco Use    Smoking status: Every Day     Packs/day: 1.50     Types: Cigarettes    Smokeless tobacco: Never   Vaping Use    Vaping Use: Never used   Substance and Sexual Activity    Alcohol use: Yes     Alcohol/week: 4.0 - 5.0 standard drinks     Types: 4 - 5 Shots of liquor per week     Comment: mixed drinks per day, whiskey    Drug use: Never    Sexual activity: Not on file   Other Topics Concern    Not on file   Social History Narrative    Not on file     Social Determinants of Health     Financial Resource Strain: Not on file   Food Insecurity: Not on file   Transportation Needs: Not on file   Physical Activity: Not on file   Stress: Not on file   Social Connections: Not on file   Intimate Partner Violence: Not on file   Housing Stability: Not on file     History reviewed. No pertinent family history. Current Medications:  Medication list reviewed with patient. Please see MAR for full details. Physical Exam:     BP (!) 80/50   Pulse (!) 108   Temp 98.3 °F (36.8 °C)   Resp 14   Ht 5' 7\" (1.702 m)   Wt 142 lb 11.2 oz (64.7 kg)   SpO2 (!) 30%   BMI 22.35 kg/m²     General:Chronically ill, cachectic male, vitals reviewed. Eyes: Reactive pupils  ENT: Unable to evaluate hearing, absence JVD  Cardiovascular: Regular rate. Respiratory: Clear to auscultation  Gastrointestinal: Soft, non tender, faint bowel sounds  Genitourinary: no suprapubic tenderness, thomas  Musculoskeletal: Dry hip incision, leg edema, intact pulses.   Skin: warm, dry  Neuro: Unresponsive  Psych: Mood unable to evaluate         Labs, Imaging, and Studies reviewed:    Echocardiogram complete 2D with doppler with color    Result Date: 10/3/2022  Transthoracic Echocardiography Report (TTE)  Demographics   Patient Name       Linda Bland     Date of Study       10/03/2022   Date of Birth      1950        Gender              Male   Age                74 year(s)        Race                Unknown   Patient Number     [id number]        Room Number         2027   Visit Number       [id number]   Corporate ID       [id number]   Accession Number   [id number]        Teresa Omer                                                           [facility]   Ordering Physician Brandan Figueroa MD  Interpreting        Brandan Figueroa MD                                       Physician  Procedure Type of Study   TTE procedure:ECHOCARDIOGRAM COMPLETE 2D W DOPPLER W COLOR. Procedure Date Date: 10/03/2022 Start: 11:01 AM Study Location: Portable Technical Quality: Adequate visualization Indications:Coronary artery disease. Patient Status: Routine Height: 67 inches Weight: 142 pounds BSA: 1.75 m2 BMI: 22.24 kg/m2 HR: 91 bpm BP: 129/61 mmHg  Conclusions   Summary  Technically difficult examination due to patient position (sitting upright  and laying on right side). Left ventricular systolic function is normal.  Ejection fraction is visually estimated at 50-55%. Sclerotic, but non-stenotic aortic valve. Trace aortic regurgitation noted. Mitral annular calcification is present. No evidence of any pericardial effusion. Grade I diastolic dysfunction . Signature   ------------------------------------------------------------------  Electronically signed by Brandan Figueroa MD (Interpreting  physician) on 10/03/2022 at 12:33 PM  ------------------------------------------------------------------   Findings   Left Ventricle  Left ventricular systolic function is normal.  Ejection fraction is visually estimated at 50-55%. Left ventricle size is normal.  No regional wall motion abnormalities. Grade I diastolic dysfunction. Left Atrium  Essentially normal left atrium. Right Atrium  Essentially normal right atrium. Right Ventricle  Essentially normal right ventricle. Aortic Valve  Sclerotic, but non-stenotic aortic valve. Trace aortic regurgitation noted. Mitral Valve  Mitral annular calcification is present. Trace mitral regurgitation.    Tricuspid Valve Trace tricuspid regurgitation is present. Pulmonic Valve  The pulmonic valve was not well visualized. Pericardial Effusion  No evidence of any pericardial effusion. Pleural Effusion  No evidence of pleural effusion. Miscellaneous  IVC and abdominal aorta are within normal limits.   M-Mode/2D Measurements & Calculations   LV Diastolic Dimension:  LV Systolic Dimension:  LA Dimension: 3 cmAO Root  4.78 cm                  3.48 cm                 Dimension: 3.9 cmLA Area:  LV FS:27.2 %             LV Volume Diastolic: 82 7.97 cm2  LV PW Diastolic: 8.21 cm ml  LV PW Systolic: 3.68 cm  LV Volume Systolic: 37  Septum Diastolic: 6.17   ml  cm                       LV EDV/LV EDV Index: 82 RV Diastolic Dimension:  Septum Systolic: 4.27 cm HN/74 [id number] ESV/LV ESV   2.39 cm  CO: 3.77 l/min           Index: 37 ml/21 m2  CI: 2.15 l/m*m2          EF Calculated (A4C):    LA/Aorta: 0.77                           54.9 %  LV Area Diastolic: 66.1  EF Calculated (2D):     LA volume/Index: 13 ml /7m2  cm2                      52.6 %  LV Area Systolic: 30.1  cm2                      LV Length: 7.3 cm                            LVOT: 2.2 cm  Doppler Measurements & Calculations   MV Peak E-Wave: 81.4    AV Peak Velocity: 101 cm/s   LVOT Peak Velocity:  cm/s                    AV Peak Gradient: 4.08 mmHg  72.2 cm/s  MV Peak A-Wave: 63.7    AV Mean Velocity: 74.9 cm/s  LVOT Mean Velocity: 45  cm/s                    AV Mean Gradient: 3 mmHg     cm/s  MV E/A Ratio: 1.28      AV VTI: 16.7 cm              LVOT Peak Gradient: 2  MV Peak Gradient: 2.65  AV Area (Continuity):2.48    mmHgLVOT Mean Gradient:  mmHg                    cm2                          1 mmHg   MV P1/2t: 76 msec       LVOT VTI: 10.9 cm  MVA by PHT:2.89 cm2   MV E' Septal Velocity:  10.5 cm/s  MV E' Lateral Velocity:  13.2 cm/s  MV E/E' septal: 7.75  MV E/E' lateral: 6.17      CT ABDOMEN PELVIS WO CONTRAST Additional Contrast? None    Result Date: 10/5/2022  EXAMINATION: CT OF THE ABDOMEN AND PELVIS WITHOUT CONTRAST 10/5/2022 10:47 am TECHNIQUE: CT of the abdomen and pelvis was performed without the administration of intravenous contrast. Multiplanar reformatted images are provided for review. Automated exposure control, iterative reconstruction, and/or weight based adjustment of the mA/kV was utilized to reduce the radiation dose to as low as reasonably achievable. COMPARISON: 09/29/2022 HISTORY: ORDERING SYSTEM PROVIDED HISTORY: Concern for bleed - possible retroperitoneal bleed TECHNOLOGIST PROVIDED HISTORY: Additional Contrast?->None Reason for exam:->Concern for bleed - possible retroperitoneal bleed Reason for Exam: Concern for bleed - possible retroperitoneal bleed FINDINGS: Lower Chest: Bronchial wall thickening with new centrilobular nodularity ground-glass opacities. Trace bilateral pleural effusions. Small pericardial effusion. Organs: Liver is normal in contour and attenuation. Gallbladder is not seen. No biliary ductal dilatation. Pancreas, adrenals and kidneys, spleen are unremarkable. Moderate calcific atherosclerosis. GI/Bowel: Large volume stool throughout the colon. Appendix is not seen though there are no secondary signs of appendicitis. High-density material in the duodenum and proximal jejunum. Questionable pneumatosis involving the duodenum. Pelvis: Brachytherapy seeds at the prostate. Poorly assessed due to streak artifact from left total hip arthroplasty. Peritoneum/Retroperitoneum: Trace ascites. No free air, organized fluid collection, lymphadenopathy. Bones/Soft Tissues: No acute bone or soft tissue abnormality. 1. No evidence of retroperitoneal bleeding. 2. High density material in the duodenum and proximal jejunum nonspecific. If there is clinical suspicion for upper GI bleed, recommend CTA. 3. Questionable pneumatosis involving the duodenum.   Alternatively this could be the result of inspissated material in the duodenal lumen. 4. New infectious/inflammatory airways process at the lung bases. XR THORACOLUMBAR SPINE (MIN 2 VIEWS)    Result Date: 10/2/2022  EXAMINATION: TWO XRAY VIEWS OF THE THORACIC AND LUMBAR SPINE 10/2/2022 2:43 pm COMPARISON: None. HISTORY: ORDERING SYSTEM PROVIDED HISTORY: T12 fracture, obtain standing with TLSO brace in place TECHNOLOGIST PROVIDED HISTORY: Reason for exam:->T12 fracture, obtain standing with TLSO brace in place Reason for Exam: T12 fx FINDINGS: The brace obscures the thoracic spine on AP view. There is a T12 compression fracture with approximately 50% loss of vertebral body height. No evidence of focal listhesis. Diffuse osteopenia with mild multilevel degenerative changes. Brace obscures the thoracic spine. T12 compression fracture with 50% loss of vertebral body height. No evidence of focal malalignment. XR PELVIS (1-2 VIEWS)    Result Date: 9/30/2022  EXAMINATION: ONE XRAY VIEW OF THE PELVIS; TWO XRAY VIEWS OF THE LEFT HIP 9/30/2022 12:52 pm COMPARISON: 09/29/2022. HISTORY: ORDERING SYSTEM PROVIDED HISTORY: S/P hip sherwin TECHNOLOGIST PROVIDED HISTORY: Reason for exam:->S/P hip sherwin Reason for Exam: S/P L hip sherwin Additional signs and symptoms: S/P L hip sherwin Relevant Medical/Surgical History: S/P L hip sherwin FINDINGS: There has been interval placement of bipolar sherwin hip arthroplasty. The tip of the femoral stem appears to overlie the lateral cortex of the proximal femoral diaphysis on some views there are foci of soft tissue gas, reflecting recent surgery. .  No specific imaging features of immediate postop complication seen. Degenerative arthritic changes affects the visualized lower lumbar spine. Brachytherapy seeds are seen. There is a Giron catheter in place. Vascular calcification changes are seen. Postsurgical changes of left sherwin hip arthroplasty as above.      XR HIP LEFT (2-3 VIEWS)    Result Date: 9/30/2022  EXAMINATION: ONE XRAY VIEW OF THE PELVIS; TWO XRAY VIEWS OF THE LEFT HIP 9/30/2022 12:52 pm COMPARISON: 09/29/2022. HISTORY: ORDERING SYSTEM PROVIDED HISTORY: S/P hip sherwin TECHNOLOGIST PROVIDED HISTORY: Reason for exam:->S/P hip sherwin Reason for Exam: S/P L hip sherwin Additional signs and symptoms: S/P L hip sherwin Relevant Medical/Surgical History: S/P L hip sherwin FINDINGS: There has been interval placement of bipolar sherwin hip arthroplasty. The tip of the femoral stem appears to overlie the lateral cortex of the proximal femoral diaphysis on some views there are foci of soft tissue gas, reflecting recent surgery. .  No specific imaging features of immediate postop complication seen. Degenerative arthritic changes affects the visualized lower lumbar spine. Brachytherapy seeds are seen. There is a Giron catheter in place. Vascular calcification changes are seen. Postsurgical changes of left sherwin hip arthroplasty as above. CT HEAD WO CONTRAST    Result Date: 10/4/2022  EXAMINATION: CT OF THE HEAD WITHOUT CONTRAST  10/4/2022 10:52 pm TECHNIQUE: CT of the head was performed without the administration of intravenous contrast. Automated exposure control, iterative reconstruction, and/or weight based adjustment of the mA/kV was utilized to reduce the radiation dose to as low as reasonably achievable. COMPARISON: 09/29/2022 HISTORY: ORDERING SYSTEM PROVIDED HISTORY: Vomiting, worsening urinary incontinence. TECHNOLOGIST PROVIDED HISTORY: Reason for exam:->Vomiting, worsening urinary incontinence. Has a \"code stroke\" or \"stroke alert\" been called? ->No Reason for Exam: Vomiting, worsening urinary incontinence. FINDINGS: BRAIN/VENTRICLES: No evidence of acute infarct or acute intracranial hemorrhage. Stable mild chronic white matter microvascular ischemic changes. Stable ventriculomegaly. No extra-axial fluid collection. Midline maintained. Basal cisterns patent. ORBITS: The visualized portion of the orbits demonstrate no acute abnormality.  SINUSES: The visualized paranasal sinuses and mastoid air cells demonstrate no acute abnormality. SOFT TISSUES/SKULL:  No acute abnormality of the visualized skull or soft tissues. 1. No acute intracranial abnormality. 2. Stable ventriculomegaly as can be seen in the setting of normal pressure hydrocephalus. 3. Stable mild chronic white matter microvascular ischemic changes. CT HEAD WO CONTRAST    Result Date: 10/1/2022  EXAMINATION: CT OF THE HEAD WITHOUT CONTRAST; CT OF THE CERVICAL SPINE WITHOUT CONTRAST 9/29/2022 2:42 pm; 9/29/2022 2:43 pm TECHNIQUE: CT of the head was performed without the administration of intravenous contrast. Automated exposure control, iterative reconstruction, and/or weight based adjustment of the mA/kV was utilized to reduce the radiation dose to as low as reasonably achievable.; CT of the cervical spine was performed without the administration of intravenous contrast. Multiplanar reformatted images are provided for review. Automated exposure control, iterative reconstruction, and/or weight based adjustment of the mA/kV was utilized to reduce the radiation dose to as low as reasonably achievable. COMPARISON: 03/23/2021 HISTORY: ORDERING SYSTEM PROVIDED HISTORY: possible AMS, unknown fall TECHNOLOGIST PROVIDED HISTORY: Reason for exam:->possible AMS, unknown fall Has a \"code stroke\" or \"stroke alert\" been called? ->No Decision Support Exception - unselect if not a suspected or confirmed emergency medical condition->Emergency Medical Condition (MA) Reason for Exam: possible AMS, unknown fall Additional signs and symptoms: none Relevant Medical/Surgical History: none; ORDERING SYSTEM PROVIDED HISTORY: possible fall, possible ams TECHNOLOGIST PROVIDED HISTORY: Reason for exam:->possible fall, possible ams Decision Support Exception - unselect if not a suspected or confirmed emergency medical condition->Emergency Medical Condition (MA) Reason for Exam: possible fall, possible ams Additional signs and symptoms: none Relevant Medical/Surgical History: none FINDINGS: BRAIN/VENTRICLES: There is no acute intracranial hemorrhage, mass effect or midline shift. No abnormal extra-axial fluid collection. The gray-white differentiation is maintained without evidence of an acute infarct. There remains stable disproportionate ventricular dilatation in relation to cerebral atrophy, with progressive periventricular white matter hypoattenuation, which may suggest normal pressure hydrocephalus with associated transependymal CSF flow. There is stable chronic small vessel ischemic white matter disease. No focus of acute abnormal brain attenuation is identified. ORBITS: The visualized portion of the orbits demonstrate no acute abnormality. SINUSES: The visualized paranasal sinuses and mastoid air cells demonstrate no acute abnormality. SOFT TISSUES/SKULL:  No acute abnormality of the visualized skull or soft tissues. CERVICAL BONES/ALIGNMENT: There is no acute fracture or subluxation. There is a stable mild 2 mm degenerative retrolisthesis of C3. No additional abnormal listhesis is identified. The vertebral bodies are otherwise normal in height and alignment. The posterior elements are intact and aligned. No destructive osseous lesion is seen. CERVICAL DEGENERATIVE CHANGES: There is stable mild multilevel spondylosis, most notable at C4 through C6. There is stable mild disc space loss and endplate sclerosis at C2-B1, C4-C5, and C5-C6. There are mild disc osteophyte protrusions at C3-C4, C4-C5, and C5-C6, without significant central canal stenosis. However, the combination of multilevel bilateral uncovertebral and facet hypertrophy leads to varying degrees of multilevel bilateral neuroforaminal stenosis. CERVICAL SOFT TISSUES: The cervical soft tissues are unremarkable. The lung apices are clear. 1. No acute intracranial abnormality. 2. Findings suggestive of normal pressure hydrocephalus.   Clinical correlation is advised. 3. No acute fracture or subluxation of the cervical spine. CT CERVICAL SPINE WO CONTRAST    Result Date: 10/1/2022  EXAMINATION: CT OF THE HEAD WITHOUT CONTRAST; CT OF THE CERVICAL SPINE WITHOUT CONTRAST 9/29/2022 2:42 pm; 9/29/2022 2:43 pm TECHNIQUE: CT of the head was performed without the administration of intravenous contrast. Automated exposure control, iterative reconstruction, and/or weight based adjustment of the mA/kV was utilized to reduce the radiation dose to as low as reasonably achievable.; CT of the cervical spine was performed without the administration of intravenous contrast. Multiplanar reformatted images are provided for review. Automated exposure control, iterative reconstruction, and/or weight based adjustment of the mA/kV was utilized to reduce the radiation dose to as low as reasonably achievable. COMPARISON: 03/23/2021 HISTORY: ORDERING SYSTEM PROVIDED HISTORY: possible AMS, unknown fall TECHNOLOGIST PROVIDED HISTORY: Reason for exam:->possible AMS, unknown fall Has a \"code stroke\" or \"stroke alert\" been called? ->No Decision Support Exception - unselect if not a suspected or confirmed emergency medical condition->Emergency Medical Condition (MA) Reason for Exam: possible AMS, unknown fall Additional signs and symptoms: none Relevant Medical/Surgical History: none; ORDERING SYSTEM PROVIDED HISTORY: possible fall, possible ams TECHNOLOGIST PROVIDED HISTORY: Reason for exam:->possible fall, possible ams Decision Support Exception - unselect if not a suspected or confirmed emergency medical condition->Emergency Medical Condition (MA) Reason for Exam: possible fall, possible ams Additional signs and symptoms: none Relevant Medical/Surgical History: none FINDINGS: BRAIN/VENTRICLES: There is no acute intracranial hemorrhage, mass effect or midline shift. No abnormal extra-axial fluid collection. The gray-white differentiation is maintained without evidence of an acute infarct. There remains stable disproportionate ventricular dilatation in relation to cerebral atrophy, with progressive periventricular white matter hypoattenuation, which may suggest normal pressure hydrocephalus with associated transependymal CSF flow. There is stable chronic small vessel ischemic white matter disease. No focus of acute abnormal brain attenuation is identified. ORBITS: The visualized portion of the orbits demonstrate no acute abnormality. SINUSES: The visualized paranasal sinuses and mastoid air cells demonstrate no acute abnormality. SOFT TISSUES/SKULL:  No acute abnormality of the visualized skull or soft tissues. CERVICAL BONES/ALIGNMENT: There is no acute fracture or subluxation. There is a stable mild 2 mm degenerative retrolisthesis of C3. No additional abnormal listhesis is identified. The vertebral bodies are otherwise normal in height and alignment. The posterior elements are intact and aligned. No destructive osseous lesion is seen. CERVICAL DEGENERATIVE CHANGES: There is stable mild multilevel spondylosis, most notable at C4 through C6. There is stable mild disc space loss and endplate sclerosis at D3-G9, C4-C5, and C5-C6. There are mild disc osteophyte protrusions at C3-C4, C4-C5, and C5-C6, without significant central canal stenosis. However, the combination of multilevel bilateral uncovertebral and facet hypertrophy leads to varying degrees of multilevel bilateral neuroforaminal stenosis. CERVICAL SOFT TISSUES: The cervical soft tissues are unremarkable. The lung apices are clear. 1. No acute intracranial abnormality. 2. Findings suggestive of normal pressure hydrocephalus. Clinical correlation is advised. 3. No acute fracture or subluxation of the cervical spine.      CT LUMBAR SPINE WO CONTRAST    Result Date: 9/29/2022  EXAMINATION: CT OF THE LUMBAR SPINE WITHOUT CONTRAST  9/29/2022 TECHNIQUE: CT of the lumbar spine was performed without the administration of intravenous contrast. Multiplanar reformatted images are provided for review. Adjustment of mA and/or kV according to patient size was utilized. Automated exposure control, iterative reconstruction, and/or weight based adjustment of the mA/kV was utilized to reduce the radiation dose to as low as reasonably achievable. COMPARISON: CT from January 17, 2020 HISTORY: ORDERING SYSTEM PROVIDED HISTORY: pain, possible fall TECHNOLOGIST PROVIDED HISTORY: Reason for exam:->pain, possible fall Decision Support Exception - unselect if not a suspected or confirmed emergency medical condition->Emergency Medical Condition (MA) Reason for Exam: pain, possible fall Additional signs and symptoms: none Relevant Medical/Surgical History: none FINDINGS: BONES/ALIGNMENT: There is an fracture of the T12 vertebral body with estimated 35% vertebral body height loss. This is favored to be acute. Minimal bony retropulsion at the posterosuperior endplate measuring 4 mm. No significant bony spinal canal stenosis however. Findings are new from January 17, 2020 and favored to be acute. Increased lucency at the superior endplate is favored to be related to the fracture with osteopenia. Pathologic fractures felt less likely, however attention on follow-up. Old right rib 11 fracture with bony callus. DEGENERATIVE CHANGES: Degenerative endplate changes most significant at L4-S1. Multilevel facet arthrosis. SOFT TISSUES/RETROPERITONEUM: No paraspinal mass is seen. Acute compression fracture of T12 with 35% vertebral body height. Mild 4 mm of bony retropulsion without significant bony spinal canal stenosis. Prominent degenerative disc disease most significant at L4-S1. MRI LUMBAR SPINE WO CONTRAST    Result Date: 10/1/2022  EXAMINATION: MRI OF THE LUMBAR SPINE WITHOUT CONTRAST, 10/1/2022 3:29 pm TECHNIQUE: Multiplanar multisequence MRI of the lumbar spine was performed without the administration of intravenous contrast. COMPARISON: None.  HISTORY: TECHNIQUE: CT of the abdomen and pelvis was performed with the administration of intravenous contrast. Multiplanar reformatted images are provided for review. Automated exposure control, iterative reconstruction, and/or weight based adjustment of the mA/kV was utilized to reduce the radiation dose to as low as reasonably achievable. COMPARISON: Hip x-ray 09/29/2022 and CT abdomen and pelvis 01/17/2020. HISTORY: ORDERING SYSTEM PROVIDED HISTORY:  Severe sepsis. concern for abd source. TECHNOLOGIST PROVIDED HISTORY: Reason for Exam:  Severe sepsis. concern for abd source. Additional Contrast?  None Decision Support Exception - unselect if not a suspected or confirmed emergency medical condition->Emergency Medical Condition (MA) Reason for Exam:  Severe sepsis. concern for abd source. FINDINGS: Lower Chest:  Stable right lower lobe nodule given 2 years of stability, favored benign. Subpleural nodule left lung base as well also unchanged from prior examination. Also given the 2 year stability, this is of likely benign etiology. Organs: Left renal cysts. Right renal cysts. These are benign etiologies requiring no further follow-up or workup. Otherwise, mild fatty infiltration of the liver. Spleen, adrenal glands, and pancreas are unremarkable. GI/Bowel: There is moderate wall thickening along gastric antrum and duodenum can be seen with duodenitis versus antral gastritis. Favor duodenitis. Mild retained stool throughout the colon. No evidence of bowel obstruction. Left lower quadrant inguinal hernia containing a small knuckle/looped bowel. Please correlate with direct physical exam findings. Otherwise moderate to severe colonic diverticulosis. Retained stool rectosigmoid junction delete Pelvis: Brachytherapy seeds within the prostate. Giron catheter within the bladder which is mostly compressed. Peritoneum/Retroperitoneum: No free air or free fluid. No suspicious adenopathy. Vascular calcifications. 9/29/2022 2:22 pm COMPARISON: 03/23/2021 HISTORY: ORDERING SYSTEM PROVIDED HISTORY: fall, confusion, sob, h/o of  TECHNOLOGIST PROVIDED HISTORY: Reason for exam:->fall, confusion, sob, h/o of  Reason for Exam: FALL CONFUSION FINDINGS: The cardiac silhouette and mediastinal contours are stable. The lungs are hyperinflated, likely related to underlying emphysema. No focal lung infiltrate. No pleural effusion or pneumothorax. Old left-sided rib fracture is noted. There is also a left clavicular fracture that has incompletely healed, age indeterminate. 1. Emphysema without evidence of a focal lung infiltrate. 2. Left clavicular fracture, of uncertain age. 3. Old, healed left-sided rib fracture. VL DUP CAROTID BILATERAL    Result Date: 10/3/2022  EXAMINATION: ULTRASOUND EVALUATION OF THE CAROTID ARTERIES 10/3/2022 TECHNIQUE: Duplex ultrasound using B-mode/gray scaled imaging, Doppler spectral analysis and color flow Doppler was obtained of the carotid arteries. COMPARISON: None HISTORY: ORDERING SYSTEM PROVIDED HISTORY:  Syncope TECHNOLOGIST PROVIDED HISTORY: Reason for Exam:  Syncope FINDINGS: RIGHT: The right common carotid artery demonstrates peak systolic velocities of 548, 69 cm/sec in the proximal and distal segments respectively. The right common carotid artery demonstrates end-diastolic velocities of 10, 8 cm/sec in the proximal and distal segments respectively. The right internal carotid artery demonstrates the systolic velocities of 53, 65, 76 cm/sec in the proximal, mid and distal segments respectively. The right internal carotid artery demonstrates the end-diastolic velocities of 10, 14, 18 cm/sec in the proximal, mid and distal segments respectively. The external carotid artery appears patent. The vertebral artery demonstrates normal antegrade flow. No evidence of focal atherosclerotic plaque.  ICA/CCA ratio of 0.7 LEFT: The left common carotid artery demonstrates peak systolic velocities of 100, 93 cm/sec in the proximal and distal segments respectively. The left common carotid artery demonstrates end-diastolic velocities of 14, 12 cm/sec in the proximal and distal segments respectively. The left internal carotid artery demonstrates the systolic velocities of 76, 75, 92 cm/sec in the proximal, mid and distal segments respectively. The left internal carotid artery demonstrates the end-diastolic velocities of 12, 15, 19 cm/sec in the proximal, mid and distal segments respectively. The external carotid artery appears patent. The vertebral artery demonstrates normal antegrade flow. There is redemonstration of focal calcified plaque within proximal internal carotid artery as seen on the prior CTA. ICA/CCA ratio of 0.9     The right internal carotid artery demonstrates 0-50% stenosis. The left internal carotid artery demonstrates 0-50% stenosis. Bilateral vertebral arteries are patent with flow in the normal direction. XR HIP 2-3 VW W PELVIS LEFT    Result Date: 9/29/2022  EXAMINATION: ONE XRAY VIEW OF THE PELVIS AND TWO XRAY VIEWS LEFT HIP 9/29/2022 2:24 pm COMPARISON: 03/23/2021 pelvis radiographs HISTORY: ORDERING SYSTEM PROVIDED HISTORY: pain, limited ROM, possible fall TECHNOLOGIST PROVIDED HISTORY: Reason for exam:->pain, limited ROM, possible fall Reason for Exam: PAIN FINDINGS: There is an acute fracture of the left femoral neck with associated foreshortening and mild lateral displacement of the distal fracture fragment. There is mild varus angulation of the fracture components. The femoral head is well seated within the acetabulum. No additional fractures are identified. Degenerative changes of the lower lumbar spine. Brachytherapy seeds overlie the pelvis. Acute, mildly displaced left femoral neck fracture with associated foreshortening and mild lateral displacement of the distal fracture fragment.        CBC:   Recent Labs     10/04/22  0902 10/05/22  0732 10/05/22  0900 WBC 8.9 15.6* 16.3*   HGB 8.8* 6.0* 5.6*   * 524* 523*     BMP:    Recent Labs     10/04/22  0902 10/05/22  0732 10/05/22  0900   * 132* 132*   K 4.0 4.7 4.9   CL 95* 97* 96*   CO2 28 21 20*   BUN 18 44* 46*   CREATININE 0.4* 1.2 1.3   GLUCOSE 111* 142* 185*     Hepatic: No results for input(s): AST, ALT, ALB, BILITOT, ALKPHOS in the last 72 hours. Lipids:   Lab Results   Component Value Date/Time    CHOL 158 09/13/2016 05:15 AM    HDL 48 09/13/2016 05:15 AM    TRIG 246 09/13/2016 05:15 AM     Hemoglobin A1C: No results found for: LABA1C  TSH: No results found for: TSH  Troponin:   Lab Results   Component Value Date/Time    TROPONINT <0.010 10/05/2022 09:00 AM    TROPONINT <0.010 09/29/2022 01:28 PM    TROPONINT <0.010 03/23/2021 04:22 AM     Lactic Acid: No results for input(s): LACTA in the last 72 hours. BNP: No results for input(s): PROBNP in the last 72 hours.   UA:  Lab Results   Component Value Date/Time    NITRU NEGATIVE 09/29/2022 03:20 PM    COLORU YELLOW 09/29/2022 03:20 PM    WBCUA <1 09/29/2022 03:20 PM    RBCUA NONE SEEN 09/29/2022 03:20 PM    MUCUS FEW 09/29/2022 03:20 PM    TRICHOMONAS NONE SEEN 09/29/2022 03:20 PM    BACTERIA NEGATIVE 09/29/2022 03:20 PM    CLARITYU CLEAR 09/29/2022 03:20 PM    SPECGRAV 1.025 09/29/2022 03:20 PM    LEUKOCYTESUR NEGATIVE 09/29/2022 03:20 PM    UROBILINOGEN 0.2 09/29/2022 03:20 PM    BILIRUBINUR NEGATIVE 09/29/2022 03:20 PM    BLOODU NEGATIVE 09/29/2022 03:20 PM    KETUA TRACE 09/29/2022 03:20 PM     Urine Cultures: No results found for: LABURIN  Blood Cultures: No results found for: BC  No results found for: BLOODCULT2  Organism: No results found for: [facility]

## 2022-10-05 NOTE — PROGRESS NOTES
Time out performed with Natalie Salazar NP for CVC insertion.  Name, , and allergies confirmed with NP.

## 2022-10-05 NOTE — PROGRESS NOTES
Patient was seen by our service 9/30-10/3 for T12 compression fracture and concerns for NPH on head CT. Patient's daughter, Amina Aguirre, contacted me yesterday evening with concerns of multiple episodes of vomiting that started suddenly yesterday after eating. She also reported some worsening confusion. A repeat head CT was obtained late yesterday evening that showed stable ventriculomegaly. No other concerns were noted. Early this morning patient's O2 stats decreased dramatically, he did have to be intubated. Per chart review patient's was tachycardic and then he became suddenly bradycardic. His family did not wish for the patient to have chest compressions and made him a CCA. Patient did unfortunately pass despite medical management of bradycardia the atropine and sodium bicarb. I was able to discuss the results of the head CT after the patient passed with his daughter, Amina Aguirre. I did explain that his head CT was similar in appearance to the when he obtained 9/30. I also explained that NPH would not cause any acute change in his mental status or abrupt onset of vomiting. She stated that she understood. Condolences were given. CT head 10/4/22  Impression   1. No acute intracranial abnormality. 2. Stable ventriculomegaly as can be seen in the setting of normal pressure   hydrocephalus. 3. Stable mild chronic white matter microvascular ischemic changes.

## 2022-10-08 LAB — HAPTOGLOBIN: 235 MG/DL (ref 30–200)

## 2022-10-10 NOTE — ANESTHESIA POSTPROCEDURE EVALUATION
Department of Anesthesiology  Postprocedure Note    Patient: Sunny Friedman  MRN: [id number]  YOB: 1950  Date of evaluation: 10/10/2022      Procedure Summary     Date: 09/30/22 Room / Location: [address]    Anesthesia Start: 1041 Anesthesia Stop: 1218    Procedure: HIP HEMIARTHROPLASTY (Left: Hip) Diagnosis:       Closed left hip fracture, [facility]      (Left Hip Fracture)    Surgeons: Maida Mcdermott DO Responsible Provider: Ana Alvarez DO    Anesthesia Type: Not recorded ASA Status: 3          Anesthesia Type: No value filed.     Kaylen Phase I: Kaylen Score: 9    Kaylen Phase II:        Anesthesia Post Evaluation    Patient location during evaluation: PACU  Patient participation: complete - patient participated  Level of consciousness: sleepy but conscious  Airway patency: patent  Nausea & Vomiting: no nausea  Complications: no  Cardiovascular status: hemodynamically stable  Respiratory status: acceptable  Hydration status: euvolemic

## (undated) DEVICE — SYSTEM SKIN CLSR 22CM DERMBND PRINEO

## (undated) DEVICE — CATHETER ELECHEMSTAS 10 FRX300 CM BPLR STD PLUG GLD PRB

## (undated) DEVICE — SUTURE VCRL SZ 2-0 L18IN ABSRB UD CT-1 L36MM 1/2 CIR J839D

## (undated) DEVICE — Z DISCONTINUED NO SUB IDED TUBING ETCO2 AD L6.5FT NSL ORAL CVD PRNG NONFLARED TIP OVR

## (undated) DEVICE — TOWEL,OR,DSP,ST,BLUE,STD,6/PK,12PK/CS: Brand: MEDLINE

## (undated) DEVICE — HOOD WITH PEEL AWAY FACE SHIELD: Brand: T7PLUS

## (undated) DEVICE — CHLORAPREP 26ML ORANGE

## (undated) DEVICE — GLOVE SURG SZ 6 CRM LTX FREE POLYISOPRENE POLYMER BEAD ANTI

## (undated) DEVICE — SUTURE ABSORBABLE 3-0 PS-1 18 IN UD MONOCRYL + STRATAFIX SXMP1B102

## (undated) DEVICE — GLOVE ORANGE PI 7 1/2   MSG9075

## (undated) DEVICE — HOOD: Brand: T7PLUS

## (undated) DEVICE — GLOVE SURG SZ 65 L12IN FNGR THK79MIL GRN LTX FREE

## (undated) DEVICE — BIPOLAR ELECTROHEMOSTASIS CATHETER: Brand: INJECTION GOLD PROBE

## (undated) DEVICE — PACK PROCEDURE SURG TOT HIP LF

## (undated) DEVICE — SOLUTION IV IRRIG WATER 1000ML POUR BRL 2F7114

## (undated) DEVICE — GOWN,SIRUS,POLYRNF,BRTHSLV,XLN/XL,20/CS: Brand: MEDLINE

## (undated) DEVICE — GLOVE SURG SZ 8 L12IN THK75MIL DK GRN LTX FREE

## (undated) DEVICE — SUTURE VCRL SZ 1 L27IN ABSRB UD L36MM CT-1 1/2 CIR JJ40G

## (undated) DEVICE — SUTURE FIBERWIRE SZ 5 L38IN NONABSORBABLE BLU L48MM 1/2 AR7211

## (undated) DEVICE — FORCEPS BX 240CM JAW 3.2MM L CAP NDL MIC MESH TTH M00513372

## (undated) DEVICE — SNARE VASC L240CM LOOP W10MM SHTH DIA2.4MM RND STIFF CLD

## (undated) DEVICE — NEEDLE SPNL L3.5IN PNK HUB S STL REG WALL FIT STYL W/ QNCKE

## (undated) DEVICE — SYRINGE MED 10ML LUERLOCK TIP W/O SFTY DISP

## (undated) DEVICE — FAN SPRAY KIT: Brand: PULSAVAC®

## (undated) DEVICE — RECIPROCATING BLADE, OFFSET (47.5 X 0.77 X 6.0MM)

## (undated) DEVICE — CLIP INT L235CM WRK CHN DIA2.8MM OPN 11MM BRAID CATH ROT BX/10